# Patient Record
Sex: FEMALE | Race: WHITE | Employment: OTHER | URBAN - METROPOLITAN AREA
[De-identification: names, ages, dates, MRNs, and addresses within clinical notes are randomized per-mention and may not be internally consistent; named-entity substitution may affect disease eponyms.]

---

## 2017-01-01 ENCOUNTER — HOSPITAL ENCOUNTER (OUTPATIENT)
Dept: OTHER | Age: 62
Discharge: HOME | End: 2017-01-31
Attending: FAMILY MEDICINE | Admitting: FAMILY MEDICINE

## 2017-01-01 ENCOUNTER — HOSPITAL ENCOUNTER (OUTPATIENT)
Dept: OTHER | Age: 62
Discharge: HOME | End: 2017-01-31
Attending: PHYSICAL MEDICINE & REHABILITATION | Admitting: PHYSICAL MEDICINE & REHABILITATION

## 2017-01-09 ENCOUNTER — ANTI-COAG VISIT (OUTPATIENT)
Dept: PHARMACY | Facility: CLINIC | Age: 62
End: 2017-01-09

## 2017-01-09 LAB — INR BLD: 1.7

## 2017-01-16 ENCOUNTER — ANTI-COAG VISIT (OUTPATIENT)
Dept: PHARMACY | Facility: CLINIC | Age: 62
End: 2017-01-16

## 2017-01-16 LAB — INR BLD: 4.5

## 2017-01-20 ENCOUNTER — ANTI-COAG VISIT (OUTPATIENT)
Dept: PHARMACY | Facility: CLINIC | Age: 62
End: 2017-01-20

## 2017-01-20 LAB — INR BLD: 3.9

## 2017-01-25 ENCOUNTER — ANTI-COAG VISIT (OUTPATIENT)
Dept: PHARMACY | Facility: CLINIC | Age: 62
End: 2017-01-25

## 2017-01-25 LAB — INR BLD: 6.1

## 2017-01-30 ENCOUNTER — ANTI-COAG VISIT (OUTPATIENT)
Dept: PHARMACY | Facility: CLINIC | Age: 62
End: 2017-01-30

## 2017-01-30 LAB — INR BLD: 2.1

## 2017-02-01 ENCOUNTER — HOSPITAL ENCOUNTER (OUTPATIENT)
Dept: OTHER | Age: 62
Discharge: HOME | End: 2017-02-28
Attending: PHYSICAL MEDICINE & REHABILITATION | Admitting: PHYSICAL MEDICINE & REHABILITATION

## 2017-02-01 ENCOUNTER — HOSPITAL ENCOUNTER (OUTPATIENT)
Dept: OTHER | Age: 62
Discharge: HOME | End: 2017-02-28
Attending: FAMILY MEDICINE | Admitting: FAMILY MEDICINE

## 2017-02-06 ENCOUNTER — ANTI-COAG VISIT (OUTPATIENT)
Dept: PHARMACY | Facility: CLINIC | Age: 62
End: 2017-02-06

## 2017-02-06 LAB — INR BLD: 4.8

## 2017-02-13 ENCOUNTER — ANTI-COAG VISIT (OUTPATIENT)
Dept: PHARMACY | Facility: CLINIC | Age: 62
End: 2017-02-13

## 2017-02-13 LAB — INR BLD: 5.8

## 2017-02-20 ENCOUNTER — ANTI-COAG VISIT (OUTPATIENT)
Dept: PHARMACY | Facility: CLINIC | Age: 62
End: 2017-02-20

## 2017-02-20 LAB — INR BLD: 3

## 2017-02-27 ENCOUNTER — ANTI-COAG VISIT (OUTPATIENT)
Dept: PHARMACY | Facility: CLINIC | Age: 62
End: 2017-02-27

## 2017-02-27 LAB — INR BLD: 2.4

## 2017-03-20 ENCOUNTER — HOSPITAL ENCOUNTER (OUTPATIENT)
Dept: PHYSICAL THERAPY | Age: 62
Discharge: HOME | End: 2017-03-20
Admitting: PHYSICAL MEDICINE & REHABILITATION

## 2017-03-20 ENCOUNTER — ANTI-COAG VISIT (OUTPATIENT)
Dept: PHARMACY | Facility: CLINIC | Age: 62
End: 2017-03-20

## 2017-03-20 LAB — INR BLD: 2

## 2017-03-23 ENCOUNTER — HOSPITAL ENCOUNTER (OUTPATIENT)
Dept: PHYSICAL THERAPY | Age: 62
Discharge: HOME | End: 2017-03-23
Admitting: PHYSICAL MEDICINE & REHABILITATION

## 2017-03-27 ENCOUNTER — HOSPITAL ENCOUNTER (OUTPATIENT)
Dept: PHYSICAL THERAPY | Age: 62
Discharge: HOME | End: 2017-03-27
Admitting: FAMILY MEDICINE

## 2017-03-30 ENCOUNTER — HOSPITAL ENCOUNTER (OUTPATIENT)
Dept: PHYSICAL THERAPY | Age: 62
Discharge: HOME | End: 2017-03-30
Admitting: PHYSICAL MEDICINE & REHABILITATION

## 2017-04-10 ENCOUNTER — OFFICE VISIT (OUTPATIENT)
Dept: URGENT CARE | Age: 62
End: 2017-04-10

## 2017-04-10 VITALS
BODY MASS INDEX: 25.61 KG/M2 | OXYGEN SATURATION: 90 % | WEIGHT: 150 LBS | DIASTOLIC BLOOD PRESSURE: 68 MMHG | SYSTOLIC BLOOD PRESSURE: 102 MMHG | TEMPERATURE: 98.8 F | RESPIRATION RATE: 16 BRPM | HEART RATE: 92 BPM | HEIGHT: 64 IN

## 2017-04-10 DIAGNOSIS — S60.051A CONTUSION OF RIGHT LITTLE FINGER WITHOUT DAMAGE TO NAIL, INITIAL ENCOUNTER: Primary | ICD-10-CM

## 2017-04-10 PROCEDURE — 99203 OFFICE O/P NEW LOW 30 MIN: CPT | Performed by: EMERGENCY MEDICINE

## 2017-04-17 ENCOUNTER — ANTI-COAG VISIT (OUTPATIENT)
Dept: PHARMACY | Facility: CLINIC | Age: 62
End: 2017-04-17

## 2017-04-17 LAB — INR BLD: 2.4

## 2017-05-18 ENCOUNTER — ANTI-COAG VISIT (OUTPATIENT)
Dept: PHARMACY | Facility: CLINIC | Age: 62
End: 2017-05-18

## 2017-05-18 LAB — INR BLD: 3.3

## 2017-06-08 ENCOUNTER — ANTI-COAG VISIT (OUTPATIENT)
Dept: PHARMACY | Facility: CLINIC | Age: 62
End: 2017-06-08

## 2017-06-08 LAB — INR BLD: 3.4

## 2017-07-10 ENCOUNTER — ANTI-COAG VISIT (OUTPATIENT)
Dept: PHARMACY | Facility: CLINIC | Age: 62
End: 2017-07-10

## 2017-07-10 LAB — INR BLD: 2.2

## 2017-08-07 ENCOUNTER — ANTI-COAG VISIT (OUTPATIENT)
Dept: PHARMACY | Facility: CLINIC | Age: 62
End: 2017-08-07

## 2017-08-07 LAB — INR BLD: 1.6

## 2017-08-28 ENCOUNTER — ANTI-COAG VISIT (OUTPATIENT)
Dept: PHARMACY | Facility: CLINIC | Age: 62
End: 2017-08-28

## 2017-08-28 LAB — INR BLD: 1.7

## 2017-09-08 ENCOUNTER — HOSPITAL ENCOUNTER (OUTPATIENT)
Dept: OTHER | Age: 62
Discharge: HOME | End: 2017-09-30
Attending: INTERNAL MEDICINE | Admitting: INTERNAL MEDICINE

## 2017-09-08 ENCOUNTER — ANTI-COAG VISIT (OUTPATIENT)
Dept: PHARMACY | Facility: CLINIC | Age: 62
End: 2017-09-08

## 2017-09-08 LAB — INR BLD: 2

## 2017-10-02 ENCOUNTER — ANTI-COAG VISIT (OUTPATIENT)
Dept: PHARMACY | Facility: CLINIC | Age: 62
End: 2017-10-02

## 2017-10-02 LAB — INR BLD: 2.3

## 2017-10-30 ENCOUNTER — ANTI-COAG VISIT (OUTPATIENT)
Dept: PHARMACY | Facility: CLINIC | Age: 62
End: 2017-10-30

## 2017-10-30 LAB — INR BLD: 1.3

## 2017-10-30 NOTE — PROGRESS NOTES
Ms. Chase Hair is here for management of anticoagulation for Afib. PMH also significant for GERD, anxiety, depression, cardiomyopathy, hypothyroid, chronic back pain, hx of breast cancer, pacemaker placement, back surgery, and osteopenia. She presents today w/out complaint. Pt verifies dosing regimen as listed above. Pt denies s/s bleeding/bruising/swelling/SOB. No BRBPR. No melena. No changes in Rx/OTCs/Herbal medications. Extra dark salads (instead of aleyda) and blueberries almost everyday (not typical for her). Pt does not smoke and rarely drinks EtOH. Pt stated she will be going on a vacation to [facility] for 2 weeks, leaving in two days! INR 1.3 is well below therapeutic range of 2-3. Maybe extra vit k this past week- darker salads and blueberries. Recommend to increase dose to 3 mg daily, except 4.5mg on Mon. Patient has Warfarin 3mg tablets. Will continue to monitor and check INR in 3 weeks (due to traveling). Dosing reminder card given with phone number, appointment date and time.    Return to clinic: 11/20/2017 @ 100p

## 2017-11-01 ENCOUNTER — HOSPITAL ENCOUNTER (OUTPATIENT)
Dept: OTHER | Age: 62
Discharge: HOME | End: 2017-11-30
Attending: INTERNAL MEDICINE | Admitting: INTERNAL MEDICINE

## 2017-11-20 ENCOUNTER — ANTI-COAG VISIT (OUTPATIENT)
Dept: PHARMACY | Facility: CLINIC | Age: 62
End: 2017-11-20

## 2017-11-20 LAB — INR BLD: 1.2

## 2017-11-20 NOTE — PROGRESS NOTES
Ms. Debra Stern is here for management of anticoagulation for Afib. PMH also significant for GERD, anxiety, depression, cardiomyopathy, hypothyroid, chronic back pain, hx of breast cancer, pacemaker placement, back surgery, and osteopenia. She presents today w/out complaint. Pt verifies dosing regimen as listed above. Pt denies s/s bleeding/bruising/swelling/SOB. No BRBPR. No melena. No changes in Rx/OTCs/Herbal medications. She was in [place] so diet was off, but she tried to watch her diet with vit k. Pt does not smoke and rarely drinks EtOH. INR 1.2 is still well below therapeutic range of 2-3. Not sure why her INR is so low? ? Recommend to increase dose to 3 mg daily, except 4.5mg on Mon/Wed/Fri. Patient has Warfarin 3mg tablets. Will continue to monitor and check INR in 2 weeks. Dosing reminder card given with phone number, appointment date and time.    Return to clinic: 12/4/2017 @ 100p

## 2017-11-27 ENCOUNTER — TELEPHONE (OUTPATIENT)
Dept: PULMONOLOGY | Age: 62
End: 2017-11-27

## 2017-11-27 DIAGNOSIS — R06.02 SOB (SHORTNESS OF BREATH): Primary | ICD-10-CM

## 2017-12-01 ENCOUNTER — HOSPITAL ENCOUNTER (OUTPATIENT)
Dept: OTHER | Age: 62
Discharge: HOME | End: 2017-12-31
Attending: INTERNAL MEDICINE | Admitting: INTERNAL MEDICINE

## 2017-12-04 ENCOUNTER — ANTI-COAG VISIT (OUTPATIENT)
Dept: PHARMACY | Facility: CLINIC | Age: 62
End: 2017-12-04

## 2017-12-04 LAB — INR BLD: 1.3

## 2017-12-04 NOTE — PROGRESS NOTES
Ms. Cayden Pickering is here for management of anticoagulation for Afib. PMH also significant for GERD, anxiety, depression, cardiomyopathy, hypothyroid, chronic back pain, hx of breast cancer, pacemaker placement, back surgery, and osteopenia. She presents today w/out complaint. Pt verifies dosing regimen as listed above. Pt denies s/s bleeding/bruising/swelling/SOB. No BRBPR. No melena. No changes in Rx/OTCs/Herbal medications. She was in [place] so diet was off, but she tried to watch her diet with vit k. Pt does not smoke and rarely drinks EtOH. Patient recently started on doxycyline 11/28 and will finish 12/7    INR 1.3 is still well below therapeutic range of 2-3. Not sure why her INR is so low? ? Recommend to increase dose to 4.5 mg on all days; except 3 mg on Saturday. Patient has Warfarin 3mg tablets. Will continue to monitor and check INR in 1 week. Dosing reminder card given with phone number, appointment date and time. Return to clinic: 12/11/2017 @ 100p    I have seen the patient and reviewed the progress note written by the PharmD Candidate. I agree with this assessment and plan.    Krupa Slade, Angy 12/4/2017 2:29 PM

## 2017-12-06 ENCOUNTER — HOSPITAL ENCOUNTER (OUTPATIENT)
Dept: PULMONOLOGY | Age: 62
Discharge: HOME | End: 2017-12-06
Attending: INTERNAL MEDICINE | Admitting: INTERNAL MEDICINE

## 2017-12-06 DIAGNOSIS — R06.02 SHORTNESS OF BREATH: ICD-10-CM

## 2017-12-06 DIAGNOSIS — R06.02 SOB (SHORTNESS OF BREATH): ICD-10-CM

## 2017-12-06 RX ORDER — ALBUTEROL SULFATE 90 UG/1
4 AEROSOL, METERED RESPIRATORY (INHALATION) ONCE
Status: COMPLETED | OUTPATIENT
Start: 2017-12-06 | End: 2017-12-06

## 2017-12-06 RX ADMIN — ALBUTEROL SULFATE 4 PUFF: 90 AEROSOL, METERED RESPIRATORY (INHALATION) at 13:35

## 2017-12-06 NOTE — PROCEDURES
[facility] Pulmonary Function Lab - Six Minute Walk      Test Performed on:   Room Air____x__   Oxygen at _____ lpm via N/C- continuous Oxygen at _____ lpm via N/C- OCD  Assist Device Used During Test:  None______ Cane____x____ Walker_________    Modified Sandie's Scale  0 Nothing at all 5 Strong    0.5 Extremely Weak 6 Stronger (Hard)    1 Very weak 7 Very Strong   2 Weak (light) 8 Very Very Strong   3 Moderate 9 Extremely Strong   4 Somewhat Strong 10 Maximum All out      Time SpO2 Heart Rate Respiratory Rate Dyspnea-  Modified Sandies Scale Fatigue- Modified Sandies Scale Other Symptoms   Baseline                     %97 80 16 1 1      1 minute                     %97 100 16 1 1    2 minutes                     %97 113 18 1 1      3 minutes                 93    % 116 20 2 1    4 minutes                   94  % 81 20 2 1    5 minutes                     %90 110 22 2 1    6 minutes                     %88 124 22 3 1    Recovery x 1 minute                     %96 106 20 2 1    Recovery x 2 Minute                     %96 99 18 2 1     Number of Laps_______5__ X 100 feet + __50_______ additional feet = Total Distance __550_____feet   Stopped or paused before 6 minutes? No_______ Yes __x______  Total expected 6 MW distance is __499______meters. Patient achieved __34____% of expected distance. Pre Blood Pressure:  Post Blood Pressure:   Other symptoms at the end of exercise:

## 2017-12-08 NOTE — PROCEDURES
[address] [address]                                PULMONARY FUNCTION    PATIENT NAME: Destini Jacome                  :        1955  MED REC NO:   [id number]                          ROOM:  ACCOUNT NO:   [de-identified]                          ADMIT DATE: 2017  PROVIDER:     Reyna Beasley MD    DATE OF PROCEDURE:  2017    PFT INTERPRETATION    The patient is a 79-year-old female, who underwent a PFT for shortness of  breath. Spirometry shows FVC of 76%, FEV1 of 76%, FEV1 to FVC ratio was  100%, FEF 25-75% was 71%. The patient did not have any significant  post-bronchodilator improvement on this study. The patient's lung volume  shows a total lung capacity to be minimally reduced. The patient has air  trapping. Along with that, the patient also has some decrease in ERV. The  patient's diffusion capacity when adjusted for volume was normal.  The  patient's flow-volume loop was normal.  The patient also had a 6-minute  walk test, which shows the patient's baseline oxygen of 97% with a heart  rate of 80%, respiratory rate of 16%, modified dyspnea scale of 1, fatigue  modified dyspnea scale of 1. The patient had minimal oxygen desaturation  at 6 minutes of walk at 88% with a heart rate of 124, but 1-minute recovery  was 96% with heart rate of 106. The patient's 2-minute recovery showed  oxygen saturation of 96% with a heart rate of 99%. The patient's distance  walked was 550 feet. The patient's total expected 6-minute walk test was  499 meters with the patient achieved 34% of the expected distance. The  patient on the basis of this has mild obstructive airway disease. Along  with that, the patient also has mild restrictive lung disease and needs to  be correlated clinically.   Along with that, the patient also has minimal  oxygen desaturation with good recovery, but with suboptimal exercise and  may need to be evaluated further. Please correlate clinically.         Stephy Villalba MD    D: 12/07/2017 16:36:02       T: 12/07/2017 18:23:49     [id number]  Job#: [id number]     Doc#: [id number]    CC:

## 2017-12-11 ENCOUNTER — ANTI-COAG VISIT (OUTPATIENT)
Dept: PHARMACY | Facility: CLINIC | Age: 62
End: 2017-12-11

## 2017-12-11 LAB — INR BLD: 1.5

## 2017-12-11 NOTE — PROGRESS NOTES
Ms. Janeen Morales is here for management of anticoagulation for Afib. PMH also significant for GERD, anxiety, depression, cardiomyopathy, hypothyroid, chronic back pain, hx of breast cancer, pacemaker placement, back surgery, and osteopenia. She presents today w/out complaint. Pt verifies dosing regimen as listed above. Pt denies s/s bleeding/bruising/swelling/SOB. No BRBPR. No melena. No changes in Rx/OTCs/Herbal medications. She was in [place] so diet was off, but she tried to watch her diet with vit k. Pt does not smoke and rarely drinks EtOH. Patient recently started on doxycyline 11/28 and will finish 12/7    INR 1.5 is still well below therapeutic range of 2-3. Not sure why her INR is so low? ? Recommend to increase dose to 4.5 mg on all days; except 6 mg on Mon & Fri  Patient has Warfarin 3mg tablets. Will continue to monitor and check INR in 1 week. Dosing reminder card given with phone number, appointment date and time. Return to clinic: 12/18/2017 @ 1:15pm    I have seen the patient and reviewed the progress note written by the PharmD Candidate. I agree with this assessment and plan.    Jasson Oglesby, Angy 12/11/2017 3:15 PM

## 2017-12-14 ENCOUNTER — TELEPHONE (OUTPATIENT)
Dept: PULMONOLOGY | Age: 62
End: 2017-12-14

## 2017-12-14 ENCOUNTER — OFFICE VISIT (OUTPATIENT)
Dept: PULMONOLOGY | Age: 62
End: 2017-12-14

## 2017-12-14 VITALS
WEIGHT: 142 LBS | OXYGEN SATURATION: 98 % | RESPIRATION RATE: 16 BRPM | BODY MASS INDEX: 24.24 KG/M2 | SYSTOLIC BLOOD PRESSURE: 128 MMHG | DIASTOLIC BLOOD PRESSURE: 78 MMHG | HEART RATE: 82 BPM | HEIGHT: 64 IN

## 2017-12-14 DIAGNOSIS — I48.0 PAROXYSMAL ATRIAL FIBRILLATION (HCC): ICD-10-CM

## 2017-12-14 DIAGNOSIS — F41.0 PANIC ATTACKS: Primary | ICD-10-CM

## 2017-12-14 DIAGNOSIS — J98.4 RESTRICTIVE LUNG DISEASE: ICD-10-CM

## 2017-12-14 PROCEDURE — 3017F COLORECTAL CA SCREEN DOC REV: CPT | Performed by: INTERNAL MEDICINE

## 2017-12-14 PROCEDURE — 99204 OFFICE O/P NEW MOD 45 MIN: CPT | Performed by: INTERNAL MEDICINE

## 2017-12-14 PROCEDURE — G8427 DOCREV CUR MEDS BY ELIG CLIN: HCPCS | Performed by: INTERNAL MEDICINE

## 2017-12-14 PROCEDURE — 3014F SCREEN MAMMO DOC REV: CPT | Performed by: INTERNAL MEDICINE

## 2017-12-14 PROCEDURE — G8482 FLU IMMUNIZE ORDER/ADMIN: HCPCS | Performed by: INTERNAL MEDICINE

## 2017-12-14 PROCEDURE — G8420 CALC BMI NORM PARAMETERS: HCPCS | Performed by: INTERNAL MEDICINE

## 2017-12-14 ASSESSMENT — ENCOUNTER SYMPTOMS
BACK PAIN: 1
EYE REDNESS: 0
SHORTNESS OF BREATH: 1
WHEEZING: 0

## 2017-12-14 NOTE — PROGRESS NOTES
[facility]    Outpatient Note    Subjective:   CHIEF COMPLAINT:   Chief Complaint   Patient presents with    New Patient          Shortness of Breath       HPI:     The patient is 60 y.o. female who presents today as a consultation for second opinion. She is complaining of recurrent episodes of shortness of breath, she is describing it as abrupt onset that can happen at any time. It usually last for 1 minute or less during this time she feels dizzy and about to pass out she has to lean on something, and try to reach for her inhaler. There is no audible wheezing during these episodes, no chest pain or palpitation. She denied any fever or chills, no cough or sputum production. She has complex past medical history, including breast cancer, congestive heart failure, which is thought to be due to chemotherapy. History of extensive back surgeries with hardware's. She is an ex-smoker quit in 1992. No history of COPD  Other history mentioned below. She describes herself as anxious person. She had episodes of panic attack prior to her last back surgery 1 year ago. She describes prior panic attacks as shortness of breath, however it feels different than what she is having now. After my evaluation and when she was checking out she had one of these episodes. She was standing in front of the nursing desk, I can describe it as frozen, when I talked to her she said she is shaky and she starts feeling numb. I quickly examined her while she was standing, there was no wheezing and no tachycardia.   The whole episode ended within 1 minute    Past Medical History:    Past Medical History:   Diagnosis Date    Acute DVT (deep venous thrombosis) ([facility] [address] 7/2014    right arm     Allergic rhinitis     Anxiety     Atrial fibrillation (HCC)     B12 deficiency     Cancer [facility])     breast cancer    CARDIAC TAMPONADE 6/2014    following insertion ICD/defibrillator - window placed to remove fluid - 7/2014    Cardiomyopathy 1/20/11    Dr. Cierra Ribera  EF=37%. MUGA scan 2/11 - shows Normal EF=55% 4/2012 - shows EF of 25% with severe global hypokinesis    Cataracts     bilateral    Chronic back pain     Dr. Leonidas Clarke - he gives her percocet - monitors.  DDD (degenerative disc disease), lumbosacral 2013    multi - level     Depression     DJD (degenerative joint disease), cervical     bone spurs of neck and spine    GERD (gastroesophageal reflux disease)     H/O cold sores     HX: breast cancer 1992    left - spread to lymph nodes and Tx with left mastectomy - modified radical and chemo tx x 3 agents and radiation and bone marrow transplant.     Hypothyroid 1/21/14    ICD (implantable cardiac defibrillator) in place 6/10/2014    Neuropathy (HCC)     left arm and hand from chemotherapy    Osteopenia 4/23/07    -1.3 in spine and -1.5 in hip    Pneumonia 1/2014    S/P cardiac cath 4/04    arteries ok - EF - 30% see report, Dr. Angela Thompson History:    History   Smoking Status    Former Smoker    Quit date: 1/1/1992   Smokeless Tobacco    Never Used     Comment: pack a week        Family History:  Family History   Problem Relation Age of Onset    Arthritis Mother      rheumatoid    Diabetes Mother      II    High Blood Pressure Mother     High Cholesterol Mother     High Cholesterol Father     Cancer Father      prostate    Arthritis Father      bilateral tkr       Current Medications:  Current Outpatient Prescriptions on File Prior to Visit   Medication Sig Dispense Refill    warfarin (COUMADIN) 3 MG tablet Take 4.5 mg (3 mg x 1.5) on Mon, Fri; 6 mg (3 mg x 2) all other days 168 tablet 3    dicloxacillin (DYNAPEN) 250 MG capsule Take 250 mg by mouth daily      DULoxetine (CYMBALTA) 30 MG capsule Take 30 mg by mouth daily      fluticasone (FLONASE) 50 MCG/ACT nasal spray 2 sprays by Nasal route daily      clonazePAM (KLONOPIN) 1 MG tablet Take 2 mg by mouth nightly       polyethylene glycol (GLYCOLAX) packet Take 17 g by mouth daily      albuterol (PROAIR HFA) 108 (90 BASE) MCG/ACT inhaler Inhale 2 puffs into the lungs every 6 hours as needed for Wheezing. 1 Inhaler 3    folic acid (FOLVITE) 1 MG tablet TAKE 1 TABLET EVERY DAY 30 tablet 1    levothyroxine (SYNTHROID) 25 MCG tablet TAKE 1 TABLET EVERY DAY 90 tablet 0    furosemide (LASIX) 20 MG tablet Take 20 mg by mouth daily.  vitamin D (CHOLECALCIFEROL) 1000 UNIT TABS tablet Take 1,000 Units by mouth daily.  famciclovir (FAMVIR) 500 MG tablet Take 1 tablet by mouth 3 times daily as needed. 90 tablet 1    lisinopril (PRINIVIL;ZESTRIL) 2.5 MG tablet Take 1 tablet by mouth daily. 30 tablet 5    carvedilol (COREG) 3.125 MG tablet Take 3.125 mg by mouth 2 times daily (with meals) 1/2 tab in the morning and 1/2 tab at night      azithromycin (ZITHROMAX) 250 MG tablet Per package instructions 1 packet 0    oxyCODONE-acetaminophen (PERCOCET) 5-325 MG per tablet 1-2 pills every 4 hours as needed for pain. 15 tablet 0     No current facility-administered medications on file prior to visit. REVIEW OF SYSTEMS:    Review of Systems   Constitutional: Negative for chills, fever and unexpected weight change. HENT: Negative for congestion. Eyes: Negative for redness. Respiratory: Positive for shortness of breath. Negative for wheezing. Cardiovascular: Negative for chest pain, palpitations and leg swelling. Musculoskeletal: Positive for back pain. Skin: Negative for rash. Neurological: Positive for dizziness and numbness. Psychiatric/Behavioral:        Anxiety   All other systems reviewed and are negative. Objective:   PHYSICAL EXAM:        VITALS:  /78 (Site: Right Arm, Position: Sitting, Cuff Size: Medium Adult)   Pulse 82   Resp 16   Ht 5' 3.5\" (1.613 m)   Wt 142 lb (64.4 kg)   SpO2 98%   Breastfeeding?  No   BMI 24.76 kg/m²     Physical Exam Constitutional: She is oriented to person, place, and time. She appears well-developed and well-nourished. HENT:   Head: Normocephalic and atraumatic. Eyes: EOM are normal. Pupils are equal, round, and reactive to light. Neck: Neck supple. No JVD present. Cardiovascular: Normal rate and regular rhythm. Exam reveals no gallop and no friction rub. No murmur heard. Pulmonary/Chest: Effort normal and breath sounds normal. No stridor. No respiratory distress. She has no wheezes. She has no rales. Abdominal: Soft. Bowel sounds are normal.   Musculoskeletal: She exhibits no edema or deformity. Neurological: She is alert and oriented to person, place, and time. Skin: Skin is warm and dry. Psychiatric: She has a normal mood and affect. Her behavior is normal.   Vitals reviewed. DATA:    Echo on 4/11/17 showed EF of 40-45% due to global hypokinesis  PFT/FVC ratio of 77% which is 100% predicted. FEV1 less than 76% predicted FVC less than 76 predicted TLC less than 79% predicted. CT scan on 5/21/16 shows no evidence of PE small right lower lobe infiltrate consistent with pneumonia (images were reviewed)  CT chest without contrast on 8/31/17 shows a rounded area of groundglass opacity within the right lower lobe infectious or inflammatory in nature. I don't have the actual images of this CT scan      Assessment:     1. Panic attacks     2. Restrictive lung disease     3. Paroxysmal atrial fibrillation [facility])         Plan:   60years old female with recurrent brief episodes of shortness of breath associated with dizziness. These episodes lost for about a minute. She had one while she was in the office. It is not associated with chest pain  She was tried on albuterol and Breo. She doesn't feel either of them is helping  PFT is consistent with restrictive disease. No evidence of fibrosis on CT scan. She does have significant back surgeries that can explain this PFT finding.       She was seen by

## 2017-12-14 NOTE — TELEPHONE ENCOUNTER
Neida Johnson MD     Fax info to Cardio Doc   [phone] fax   phone- [phone]  Will Fax once Office note is done.

## 2017-12-18 ENCOUNTER — ANTI-COAG VISIT (OUTPATIENT)
Dept: PHARMACY | Facility: CLINIC | Age: 62
End: 2017-12-18

## 2017-12-18 LAB — INR BLD: 1.6

## 2017-12-18 NOTE — PROGRESS NOTES
Ms. Kathryn Cedillo is here for management of anticoagulation for Afib. PMH also significant for GERD, anxiety, depression, cardiomyopathy, hypothyroid, chronic back pain, hx of breast cancer, pacemaker placement, back surgery, and osteopenia. She presents today w/out complaint. Pt verifies dosing regimen as listed above. Pt denies s/s bleeding/bruising/swelling/SOB. No BRBPR. No melena. No changes in Rx/OTCs/Herbal medications. Pt does not smoke and rarely drinks EtOH. INR 1.6 is still well below therapeutic range of 2-3. Not sure why her INR is so low? ? Recommended to decrease protein bars to 4-5 times a week to see if that helps. Recommend to increase dose to 4.5 mg on all days, except 6 mg on Mon/Wed/Fri. Patient has Warfarin 3mg tablets. Will continue to monitor and check INR in 1 week. Dosing reminder card given with phone number, appointment date and time.    Return to clinic: 12/28/2017 @ 1:00pm

## 2017-12-28 ENCOUNTER — ANTI-COAG VISIT (OUTPATIENT)
Dept: PHARMACY | Facility: CLINIC | Age: 62
End: 2017-12-28

## 2017-12-28 LAB — INR BLD: 2.1

## 2017-12-28 NOTE — PROGRESS NOTES
Ms. Chase Hair is here for management of anticoagulation for Afib. PMH also significant for GERD, anxiety, depression, cardiomyopathy, hypothyroid, chronic back pain, hx of breast cancer, pacemaker placement, back surgery, and osteopenia. She presents today w/out complaint. Pt verifies dosing regimen as listed above. Pt denies s/s bleeding/bruising/swelling/SOB. No BRBPR. No melena. No changes in Rx/OTCs/Herbal medications. Pt does not smoke and rarely drinks EtOH. Pt had cut back on her protein bars since last visit, as well as diet changes from holidays. INR 2.1 is within therapeutic range of 2-3. Recommend to continue 4.5 mg on all days, except 6 mg on Mon/Wed/Fri. Patient has Warfarin 3mg tablets. Will continue to monitor and check INR in 2 weeks. Dosing reminder card given with phone number, appointment date and time.    Return to clinic: 1/9/2018 @ 1:00pm    Jeri Pizano PharmD 1:03 PM 12/28/17

## 2018-01-01 ENCOUNTER — HOSPITAL ENCOUNTER (OUTPATIENT)
Dept: OTHER | Age: 63
Discharge: HOME | End: 2018-01-31
Attending: INTERNAL MEDICINE | Admitting: INTERNAL MEDICINE

## 2018-01-09 ENCOUNTER — ANTI-COAG VISIT (OUTPATIENT)
Dept: PHARMACY | Facility: CLINIC | Age: 63
End: 2018-01-09

## 2018-01-09 LAB — INR BLD: 1.7

## 2018-01-15 ENCOUNTER — ANTI-COAG VISIT (OUTPATIENT)
Dept: PHARMACY | Facility: CLINIC | Age: 63
End: 2018-01-15

## 2018-01-15 LAB — INR BLD: 2.6

## 2018-01-29 ENCOUNTER — ANTI-COAG VISIT (OUTPATIENT)
Dept: PHARMACY | Facility: CLINIC | Age: 63
End: 2018-01-29

## 2018-01-29 LAB — INR BLD: 3.7

## 2018-02-01 ENCOUNTER — HOSPITAL ENCOUNTER (OUTPATIENT)
Dept: OTHER | Age: 63
Discharge: HOME | End: 2018-02-28
Attending: INTERNAL MEDICINE | Admitting: INTERNAL MEDICINE

## 2018-02-12 ENCOUNTER — ANTI-COAG VISIT (OUTPATIENT)
Dept: PHARMACY | Facility: CLINIC | Age: 63
End: 2018-02-12

## 2018-02-12 LAB — INR BLD: 2.8

## 2018-03-01 ENCOUNTER — HOSPITAL ENCOUNTER (OUTPATIENT)
Dept: OTHER | Age: 63
Discharge: HOME | End: 2018-03-31
Attending: INTERNAL MEDICINE | Admitting: INTERNAL MEDICINE

## 2018-03-05 ENCOUNTER — ANTI-COAG VISIT (OUTPATIENT)
Dept: PHARMACY | Facility: CLINIC | Age: 63
End: 2018-03-05

## 2018-03-05 LAB — INR BLD: 2.7

## 2018-03-06 ENCOUNTER — HOSPITAL ENCOUNTER (OUTPATIENT)
Dept: PHYSICAL THERAPY | Age: 63
Discharge: HOME | End: 2018-03-31
Admitting: INTERNAL MEDICINE

## 2018-04-01 ENCOUNTER — HOSPITAL ENCOUNTER (OUTPATIENT)
Dept: OTHER | Age: 63
Discharge: HOME | End: 2018-04-30
Attending: INTERNAL MEDICINE | Admitting: INTERNAL MEDICINE

## 2018-04-02 ENCOUNTER — ANTI-COAG VISIT (OUTPATIENT)
Dept: PHARMACY | Facility: CLINIC | Age: 63
End: 2018-04-02

## 2018-04-02 LAB — INR BLD: 2.6

## 2018-05-01 ENCOUNTER — HOSPITAL ENCOUNTER (OUTPATIENT)
Dept: OTHER | Age: 63
Discharge: HOME | End: 2018-05-31
Attending: INTERNAL MEDICINE | Admitting: INTERNAL MEDICINE

## 2018-05-03 ENCOUNTER — ANTI-COAG VISIT (OUTPATIENT)
Dept: PHARMACY | Facility: CLINIC | Age: 63
End: 2018-05-03

## 2018-05-03 LAB — INR BLD: 1.9

## 2018-05-09 ENCOUNTER — OFFICE VISIT (OUTPATIENT)
Dept: ORTHOPEDIC SURGERY | Age: 63
End: 2018-05-09

## 2018-05-09 ENCOUNTER — HOSPITAL ENCOUNTER (OUTPATIENT)
Dept: OCCUPATIONAL THERAPY | Age: 63
Discharge: HOME | End: 2018-05-31
Admitting: ORTHOPAEDIC SURGERY

## 2018-05-09 VITALS — WEIGHT: 141.98 LBS | BODY MASS INDEX: 25.16 KG/M2 | HEIGHT: 63 IN

## 2018-05-09 DIAGNOSIS — S52.132A CLOSED DISPLACED FRACTURE OF NECK OF LEFT RADIUS, INITIAL ENCOUNTER: ICD-10-CM

## 2018-05-09 DIAGNOSIS — R52 PAIN: Primary | ICD-10-CM

## 2018-05-09 PROCEDURE — G8427 DOCREV CUR MEDS BY ELIG CLIN: HCPCS | Performed by: ORTHOPAEDIC SURGERY

## 2018-05-09 PROCEDURE — 3017F COLORECTAL CA SCREEN DOC REV: CPT | Performed by: ORTHOPAEDIC SURGERY

## 2018-05-09 PROCEDURE — 24650 CLTX RDL HEAD/NCK FX WO MNPJ: CPT | Performed by: ORTHOPAEDIC SURGERY

## 2018-05-09 PROCEDURE — 99203 OFFICE O/P NEW LOW 30 MIN: CPT | Performed by: ORTHOPAEDIC SURGERY

## 2018-05-09 PROCEDURE — G8419 CALC BMI OUT NRM PARAM NOF/U: HCPCS | Performed by: ORTHOPAEDIC SURGERY

## 2018-05-09 PROCEDURE — L3660 SO 8 AB RSTR CAN/WEB PRE OTS: HCPCS | Performed by: ORTHOPAEDIC SURGERY

## 2018-05-09 PROCEDURE — 1036F TOBACCO NON-USER: CPT | Performed by: ORTHOPAEDIC SURGERY

## 2018-05-14 ENCOUNTER — ANTI-COAG VISIT (OUTPATIENT)
Dept: PHARMACY | Facility: CLINIC | Age: 63
End: 2018-05-14

## 2018-05-14 LAB — INR BLD: 2.3

## 2018-05-16 ENCOUNTER — OFFICE VISIT (OUTPATIENT)
Dept: ORTHOPEDIC SURGERY | Age: 63
End: 2018-05-16

## 2018-05-16 DIAGNOSIS — M25.522 ELBOW PAIN, LEFT: Primary | ICD-10-CM

## 2018-05-16 DIAGNOSIS — S52.132A CLOSED DISPLACED FRACTURE OF NECK OF LEFT RADIUS, INITIAL ENCOUNTER: ICD-10-CM

## 2018-05-16 PROCEDURE — 99024 POSTOP FOLLOW-UP VISIT: CPT | Performed by: ORTHOPAEDIC SURGERY

## 2018-05-30 ENCOUNTER — TELEPHONE (OUTPATIENT)
Dept: ORTHOPEDIC SURGERY | Age: 63
End: 2018-05-30

## 2018-06-01 ENCOUNTER — OFFICE VISIT (OUTPATIENT)
Dept: ORTHOPEDIC SURGERY | Age: 63
End: 2018-06-01

## 2018-06-01 ENCOUNTER — HOSPITAL ENCOUNTER (OUTPATIENT)
Dept: OCCUPATIONAL THERAPY | Age: 63
Discharge: HOME | End: 2018-06-30
Attending: ORTHOPAEDIC SURGERY | Admitting: ORTHOPAEDIC SURGERY

## 2018-06-01 ENCOUNTER — HOSPITAL ENCOUNTER (OUTPATIENT)
Dept: OTHER | Age: 63
Discharge: HOME | End: 2018-06-30
Attending: INTERNAL MEDICINE | Admitting: INTERNAL MEDICINE

## 2018-06-01 ENCOUNTER — HOSPITAL ENCOUNTER (OUTPATIENT)
Dept: OTHER | Age: 63
Discharge: HOME | End: 2018-06-14
Attending: INTERNAL MEDICINE | Admitting: INTERNAL MEDICINE

## 2018-06-01 VITALS — HEIGHT: 63 IN | BODY MASS INDEX: 25.16 KG/M2 | WEIGHT: 141.98 LBS

## 2018-06-01 DIAGNOSIS — R52 PAIN: Primary | ICD-10-CM

## 2018-06-01 DIAGNOSIS — S52.132D CLOSED DISPLACED FRACTURE OF NECK OF LEFT RADIUS WITH ROUTINE HEALING, SUBSEQUENT ENCOUNTER: ICD-10-CM

## 2018-06-01 PROBLEM — S52.132A CLOSED DISPLACED FRACTURE OF NECK OF LEFT RADIUS: Status: ACTIVE | Noted: 2018-06-01

## 2018-06-01 PROCEDURE — 99024 POSTOP FOLLOW-UP VISIT: CPT | Performed by: ORTHOPAEDIC SURGERY

## 2018-06-11 ENCOUNTER — ANTI-COAG VISIT (OUTPATIENT)
Dept: PHARMACY | Facility: CLINIC | Age: 63
End: 2018-06-11

## 2018-06-11 LAB — INR BLD: 2.4

## 2018-06-19 ENCOUNTER — OFFICE VISIT (OUTPATIENT)
Dept: ORTHOPEDIC SURGERY | Age: 63
End: 2018-06-19

## 2018-06-19 VITALS — HEIGHT: 63 IN | WEIGHT: 141.98 LBS | BODY MASS INDEX: 25.16 KG/M2

## 2018-06-19 DIAGNOSIS — S52.132D CLOSED DISPLACED FRACTURE OF NECK OF LEFT RADIUS WITH ROUTINE HEALING, SUBSEQUENT ENCOUNTER: ICD-10-CM

## 2018-06-19 DIAGNOSIS — M25.522 ELBOW PAIN, LEFT: Primary | ICD-10-CM

## 2018-06-19 PROCEDURE — 99024 POSTOP FOLLOW-UP VISIT: CPT | Performed by: ORTHOPAEDIC SURGERY

## 2018-07-01 ENCOUNTER — HOSPITAL ENCOUNTER (OUTPATIENT)
Dept: OTHER | Age: 63
Discharge: HOME | End: 2018-07-01
Attending: INTERNAL MEDICINE | Admitting: INTERNAL MEDICINE

## 2018-07-09 ENCOUNTER — ANTI-COAG VISIT (OUTPATIENT)
Dept: PHARMACY | Facility: CLINIC | Age: 63
End: 2018-07-09

## 2018-07-09 LAB — INR BLD: 2.5

## 2018-07-09 NOTE — PROGRESS NOTES
Ms. Genseis Daily is here for management of anticoagulation for Afib. PMH also significant for GERD, anxiety, depression, cardiomyopathy, hypothyroid, chronic back pain, hx of breast cancer, pacemaker placement, back surgery, and osteopenia. She presents today w/out complaint. Pt verifies dosing regimen as listed above. Pt denies s/s bleeding/bruising/swelling/SOB. No BRBPR. No melena. No changes in Rx/OTC/Herbal medications. Pt does not smoke and rarely drinks EtOH. She is trying to stick with about 4 protein bars per week, she did add the blueberries back in- almost daily. Encouraged her to continue 1-2 servings of broccoli or ludivina slaw. Her arm is broken from fall several weeks ago, fell again and arm is not healing as fast as they hoped. INR 2.5 within therapeutic range of 2-3. Recommend to continue 4.5 mg on all days, except 6mg on Mon/Wed/Fri. Patient has Warfarin 3mg tablets. Will continue to monitor and check INR in 4 weeks. Dosing reminder card given with phone number, appointment date and time.    Return to clinic: 8/6 @ 1:00 pm

## 2018-07-10 ENCOUNTER — TELEPHONE (OUTPATIENT)
Dept: ORTHOPEDIC SURGERY | Age: 63
End: 2018-07-10

## 2018-07-10 DIAGNOSIS — S42.409A CLOSED FRACTURE OF ELBOW, UNSPECIFIED LATERALITY, INITIAL ENCOUNTER: Primary | ICD-10-CM

## 2018-07-10 NOTE — TELEPHONE ENCOUNTER
----- Message from Maeve Perez MD sent at 7/10/2018  7:40 AM EDT -----  Like any fracture of the elbow stiffness as this early following the injury that she sustained when she fell is very common. This includes flexion and extension of the elbow along with rotation of the forearm. Yes, therapy should be prescribed. As discussed with her at last visit we have been very slow with progressing motion and initiating therapy secondary to her fracture that we did not want to displace further. Therapy can begin this week. Range of motion, stretching, strengthening and modalities. Follow-up as discussed at last visit.      ----- Message -----  From: Troy Aquino MA  Sent: 7/9/2018   1:30 PM  To: Maeve Perez MD    6/19/18  Left radial head neck fracture DOI 4/29/2018  IMPRESSION AND PLAN: Left radial head fracture  Doing well after sustaining a left elbow injury, treated nonoperatively. X-rays reveal maintained alignment, patient is doing well clinically also. We will continue with our current care plan. Brace off for gentle motion and hygiene. She will wear her brace when in the community and when up and ambulating otherwise, she is somewhat unstable on her feet, we do not want to risk displacement or refracture as the fracture has not fully healed yet. Follow-up in 4-5 weeks unless needed sooner. Repeat x-ray left elbow that time. All questions and concerns were addressed today. Patient is in agreement with the plan      Pt called, stating she was told to try to put palm down, and she is unable to rotate wrist at all. She is very concerned. I do not see where she has been prescribed any OT. Do you want to see her or send her to ot?

## 2018-07-12 ENCOUNTER — HOSPITAL ENCOUNTER (OUTPATIENT)
Dept: OCCUPATIONAL THERAPY | Age: 63
Discharge: HOME | End: 2018-07-13
Admitting: ORTHOPAEDIC SURGERY

## 2018-07-12 NOTE — FLOWSHEET NOTE
gripping etc.      OBJECTIVE:   Date:  Hand Dominance:     [x]  Right    [] Left 7/12/2018      Objective Measures:     PAIN 1/10    Quick DASH 73%   Digit  ROM         Index                                      Long                                Ring                                Small     Digits tip to DPFC in cm Able to make loose fist (Same as pre fracture per pt. Thumb ROM MP  IP   Thumb opposition  R:  L:   Thumb Radial/Palmar abd ROM R:  L:   Wrist ROM Ext/Flex R:  L:   Rad/Uln dev ROM R:  L:   Forearm ROM  Sup/pron R: 80/80  L: WNL/70   Elbow ROM Ext/flex R:  L: 5/142   Shoulder Flex  Shoulder Abd  Shoulder IR/ER     Edema in cm circumf. MCPJs R:  L:   Edema in cm circumf. Elbow  R: 23cm  L: 24.5 cm     strength in lbs R: 50#  L: 16#   Pinch Strengthin lbs: lat  R:  L:   Pinch Strength in lbs:  3 point R:  L:      MMT:             Modalities: 7/12/18      NO STIM  HP with extra towels x 6'         Therapeutic Exercise & Activities:     AROM/PROM elbow  5' with cues                          Education and precautions                           Therapeutic Exercise and NMR EXR  [x] (62729) Provided verbal/tactile cueing for activities related to strengthening, flexibility, endurance, ROM  for improvements in scapular, scapulothoracic and UE control with self care, reaching, carrying, lifting, house/yardwork, driving/computer work.    [] (19862) Provided verbal/tactile cueing for activities related to improving balance, coordination, kinesthetic sense, posture, motor skill, proprioception  to assist with  scapular, scapulothoracic and UE control with self care, reaching, carrying, lifting, house/yardwork, driving/computer work. Therapeutic Activities:    [] ( 19878) therapeutic activities, direct (one on one) patient contact. Use of dynamic activities to improve functional performance.     Activities of Daily Living:  [] (96456) Provided self-care/home management training (i.e., activities of daily

## 2018-07-12 NOTE — PLAN OF CARE
intake form. 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 Pt. Also has balance problems and hx of cancer. She reports that L hand did not function properly prior to fracture. Difficulty with buttons, zippers, gripping etc.     Pain Scale: 0/10 1-2/10  []Constant      [x]Intermittent    []other:  Pain Location:  Elbow   Easing factors:   Provocative factors: use      Occupational Profile:  Home Enviroment: lives with  [x] spouse,  [] family,  [] alone,  [] significant other,   [] other:    Occupation/School: retired     Recreational Activities/Meaningful Interests: house chores, gardening, pilates, walking     Prior Level of Function: [x] Independent with ADLs/IADLs     [] Assistance needed (describe):    Patient-Identified Primary Performance Deficits (to be addressed in POC):   [x] bathing    [x] household tasks (cooking/cleaning)   [x] dressing    [] self feeding   [x] grooming    [] work/education   [] functional mobility   [] sleeping/rest   [] toileting/hygiene   [x] recreational activities   [] driving    [] community/social participation   [] other:     Comorbidities Affecting Functional Performance:     [x]Anxiety (F41.9)/Depression (F32.9)   []Diabetes Type 1(E10.65) or 2 (E11.65)   []Rheumatoid Arthritis (M05.9)  []Fibromyalgia (M79.7)  [x]Neuropathy(G60.9)  []Osteoarthritis(M19.91)  []None   []Other:    OBJECTIVE:   Date:  Hand Dominance:     [x]  Right    [] Left 7/12/2018     Objective Measures:    PAIN 1/10    Quick DASH 73%   Digit  ROM         Index                                   Long                               Ring                               Small    Digits tip to DPFC in cm Able to make loose fist (Same as pre fracture per pt.     Thumb ROM MP  IP   Thumb opposition  R:  L:   Thumb Radial/Palmar abd ROM R:  L:   Wrist ROM Ext/Flex R:  L:   Rad/Uln dev ROM R:  L:   Forearm ROM

## 2018-07-19 ENCOUNTER — HOSPITAL ENCOUNTER (OUTPATIENT)
Dept: OCCUPATIONAL THERAPY | Age: 63
Setting detail: THERAPIES SERIES
Discharge: HOME | End: 2018-07-19
Payer: MEDICARE

## 2018-07-19 PROCEDURE — 97112 NEUROMUSCULAR REEDUCATION: CPT | Performed by: OCCUPATIONAL THERAPIST

## 2018-07-19 PROCEDURE — 97140 MANUAL THERAPY 1/> REGIONS: CPT | Performed by: OCCUPATIONAL THERAPIST

## 2018-07-19 PROCEDURE — 97110 THERAPEUTIC EXERCISES: CPT | Performed by: OCCUPATIONAL THERAPIST

## 2018-07-19 NOTE — FLOWSHEET NOTE
not function properly prior to fracture. Difficulty with buttons, zippers, gripping etc.      OBJECTIVE:   Date:  Hand Dominance:     [x]  Right    [] Left 7/12/2018 7/19/18    Objective Measures:      PAIN 1/10     Quick DASH 73%    Digit  ROM         Index                                       Long                                 Ring                                 Small      Digits tip to DPFC in cm Able to make loose fist (Same as pre fracture per pt. Thumb ROM MP  IP    Thumb opposition  R:  L:    Thumb Radial/Palmar abd ROM R:  L:    Wrist ROM Ext/Flex R:  L:    Rad/Uln dev ROM R:  L:    Forearm ROM  Sup/pron R: 80/80  L: WNL/70    Elbow ROM Ext/flex R:  L: 5/142 R: 3/143  L: 0/150   Shoulder Flex  Shoulder Abd  Shoulder IR/ER      Edema in cm circumf. MCPJs R:  L:    Edema in cm circumf.   Elbow  R: 23cm  L: 24.5 cm    L: 23.5    strength in lbs R: 50#  L: 16#   L: 16#   Pinch Strengthin lbs: lat  R:  L:    Pinch Strength in lbs:  3 point R:  L:       MMT:              Modalities: 7/12/18 7/19/18    NO STIM  HP with extra towels x 6' No heat         Therapeutic Exercise & Activities:     AROM/PROM elbow  5' with cues  AROM elbow    pulleys     Bicep curls   2# 15 x  1# 15 x   S/p   No weight with focus on stabilizing wrist in neutral    Wrist ext against gravity   15 x 2     Education and precautions                           Therapeutic Exercise and NMR EXR  [x] (96793) Provided verbal/tactile cueing for activities related to strengthening, flexibility, endurance, ROM  for improvements in scapular, scapulothoracic and UE control with self care, reaching, carrying, lifting, house/yardwork, driving/computer work.    [] ([id number]) Provided verbal/tactile cueing for activities related to improving balance, coordination, kinesthetic sense, posture, motor skill, proprioception  to assist with  scapular, scapulothoracic and UE control with self care, reaching, carrying, lifting, house/yardwork,

## 2018-07-24 ENCOUNTER — OFFICE VISIT (OUTPATIENT)
Dept: ORTHOPEDIC SURGERY | Age: 63
End: 2018-07-24

## 2018-07-24 DIAGNOSIS — M25.522 ELBOW PAIN, LEFT: Primary | ICD-10-CM

## 2018-07-24 DIAGNOSIS — S42.409A CLOSED FRACTURE OF ELBOW, UNSPECIFIED LATERALITY, INITIAL ENCOUNTER: ICD-10-CM

## 2018-07-24 PROCEDURE — 3017F COLORECTAL CA SCREEN DOC REV: CPT | Performed by: ORTHOPAEDIC SURGERY

## 2018-07-24 PROCEDURE — G8427 DOCREV CUR MEDS BY ELIG CLIN: HCPCS | Performed by: ORTHOPAEDIC SURGERY

## 2018-07-24 PROCEDURE — E0191 PROTECTOR HEEL OR ELBOW: HCPCS | Performed by: ORTHOPAEDIC SURGERY

## 2018-07-24 PROCEDURE — G8419 CALC BMI OUT NRM PARAM NOF/U: HCPCS | Performed by: ORTHOPAEDIC SURGERY

## 2018-07-24 PROCEDURE — 1036F TOBACCO NON-USER: CPT | Performed by: ORTHOPAEDIC SURGERY

## 2018-07-24 PROCEDURE — 99024 POSTOP FOLLOW-UP VISIT: CPT | Performed by: ORTHOPAEDIC SURGERY

## 2018-07-24 NOTE — PROGRESS NOTES
Chief Complaint   Patient presents with    Follow-up     Left radial head neck fracture DOI 4/29/2018       HISTORY OF PRESENT ILLNESS:  Maya Crawford is a 58 y.o.  patient here for repeat x-ray evaluation after sustaining a Left elbow injury with a left radial head fracture approximately 2.5 months ago, treated nonoperatively. Once again, she does have limited use of the left arm secondary to previous nerve injury following thoracic outlet surgery. No pain at rest at this point. She has some discomfort in the elbow with certain stretching maneuvers. She has done some therapy to help regain motion. ROS:  ROS neg     Past medical history is reviewed again today, no changes to report    PHYSICAL EXAMINATION:  Patient is alert and pleasant, in no acute distress. The affected extremity is examined today. Left elbow reveals normal alignment clinically with minimal residual swelling. No sign of DVT. Palpation over the radial head does not cause discomfort or crepitance. She has extension to 0 and flexion to 120. No crepitance or clicking. No varus or valgus instability. When the elbow was isolated there is full pronation and 80° of supination. Stable neuro exam left hand as before with profound atrophy    X-rays:  3 views, left elbow, impression: Radial head fracture that is healing, no new displacement    IMPRESSION AND PLAN: Left elbow pain, left radial head fracture, left elbow stiffness    X-rays reveal maintained alignment, patient is doing well clinically also. We will continue with our current care plan. Progressive range of motion and use of the left arm. Edema sleeve. We may transition from the firm brace to a Heelbo except for unsafe activities which then I do recommend the firm brace until fully healed. She is can be very careful not to fall on the left arm and cause refracture. Follow-up in 3 months unless needed sooner. Repeat x-ray left elbow at that time.         Procedures   

## 2018-07-27 ENCOUNTER — HOSPITAL ENCOUNTER (OUTPATIENT)
Dept: OCCUPATIONAL THERAPY | Age: 63
Setting detail: THERAPIES SERIES
Discharge: HOME | End: 2018-07-27
Payer: MEDICARE

## 2018-07-27 PROCEDURE — 97112 NEUROMUSCULAR REEDUCATION: CPT | Performed by: OCCUPATIONAL THERAPIST

## 2018-07-27 PROCEDURE — 97110 THERAPEUTIC EXERCISES: CPT | Performed by: OCCUPATIONAL THERAPIST

## 2018-08-06 ENCOUNTER — ANTI-COAG VISIT (OUTPATIENT)
Dept: PHARMACY | Age: 63
End: 2018-08-06
Payer: MEDICARE

## 2018-08-06 LAB — INR BLD: 2.9

## 2018-08-06 PROCEDURE — 99211 OFF/OP EST MAY X REQ PHY/QHP: CPT | Performed by: INTERNAL MEDICINE

## 2018-08-06 PROCEDURE — 85610 PROTHROMBIN TIME: CPT | Performed by: INTERNAL MEDICINE

## 2018-08-06 RX ORDER — SPIRONOLACTONE 25 MG/1
25 TABLET ORAL DAILY
COMMUNITY

## 2018-08-06 NOTE — PROGRESS NOTES
Ms. Justo Franklin is here for management of anticoagulation for Afib. PMH also significant for GERD, anxiety, depression, cardiomyopathy, hypothyroid, chronic back pain, hx of breast cancer, pacemaker placement, back surgery, and osteopenia. She presents today w/out complaint. Pt verifies dosing regimen as listed above. Pt denies s/s bleeding/bruising/swelling/SOB. No BRBPR. No melena. No changes in Rx/OTC/Herbal medications. Pt does not smoke and rarely drinks EtOH. She is trying to stick with about 4 protein bars per week, she did add the blueberries back in- almost daily. Encouraged her to continue 1-2 servings of broccoli or ludivina slaw. Her arm is broken from fall several months ago, and it is not healing as fast as they hoped. She is leaving tomorrow for a vacation to [place] a [place]. INR 2.9 within therapeutic range of 2-3. Recommend to continue 4.5 mg on all days, except 6mg on Mon/Wed/Fri. Patient has Warfarin 3mg tablets. Will continue to monitor and check INR in 5 weeks. Dosing reminder card given with phone number, appointment date and time. Return to clinic: 9/10 @ 1:00 pm    I have seen the patient and reviewed the progress note written by the PharmD Candidate. I agree with this assessment and plan.    Zoe Almeida, Angy 8/6/2018 1:55 PM

## 2018-08-23 DIAGNOSIS — S42.402A CLOSED FRACTURE OF LEFT ELBOW, INITIAL ENCOUNTER: Primary | ICD-10-CM

## 2018-09-04 ENCOUNTER — HOSPITAL ENCOUNTER (OUTPATIENT)
Dept: PHYSICAL THERAPY | Age: 63
Setting detail: THERAPIES SERIES
Discharge: HOME | End: 2018-09-04
Payer: MEDICARE

## 2018-09-04 PROCEDURE — G8986 CARRY D/C STATUS: HCPCS

## 2018-09-04 PROCEDURE — 97140 MANUAL THERAPY 1/> REGIONS: CPT

## 2018-09-04 PROCEDURE — 97161 PT EVAL LOW COMPLEX 20 MIN: CPT

## 2018-09-04 PROCEDURE — G8985 CARRY GOAL STATUS: HCPCS

## 2018-09-04 PROCEDURE — G8984 CARRY CURRENT STATUS: HCPCS

## 2018-09-04 NOTE — PROGRESS NOTES
Clearing of 5 major brennan areas followed by KENNETH SCOTT x 30 minutes with pathway across chest. Tubigrip applied. Extra time spent educating pt in self massage and/or how spouse can assist with this treatment. Written handouts with pictures provided. Pt voices understanding. Assessment   Conditions Requiring Skilled Therapeutic Intervention  Assessment: Pt referred to PT For LUE lymphedema. S/P L radical mastectomy, lymph node removal, chemo and radiation [id number]. Recent L radius fx 4/29/2018 with conservative management in splint. Hand therapist noted lymphedema developing and initiated referral to PT. Today lymphedema appears resolved. Pt educated in lymphedema prevention/precautions, use of compression and self massage. One treatment of manual lymph drainage done to promote lymph flow as well as to educate pt in technique and let her feel amt of presssure used. Pt voiced understanding of all instructions and agrees no additional PT needed at this time. Pt given written materials with pictures and info on obtaining compression sleeve 20-30mmHG to wear once splint removed (will cont easier to don tubigrip until then). No additional PT planned  Treatment Diagnosis: Latency stage LUE lymphedema  Prognosis: Excellent  Decision Making: Low Complexity  Patient Education: role of PT, lymphedema prevention/precautions, use of compression, self massage  Barriers to Learning: none, pt voices understanding  REQUIRES PT FOLLOW UP: No  Activity Tolerance: Patient Tolerated treatment well      Plan   Times per week: 1 x only  Current Treatment Recommendations: Manual Lymphatic Drainage, Safety Education & Training    G-Code  PT G-Codes  Functional Assessment Tool Used: Quick DASH  Score: Raw Score 47  Functional Limitation: Carrying, moving and handling objects  Carrying, Moving and Handling Objects Current Status ():  At least 80 percent but less than 100 percent impaired, limited or restricted  Carrying, Moving and Handling Objects Goal Status (): At least 60 percent but less than 80 percent impaired, limited or restricted  Carrying, Moving and Handling Objects Discharge Status ():  At least 80 percent but less than 100 percent impaired, limited or restricted    Goals  Short term goals  Time Frame for Short term goals: 1 visit  Short term goal 1: pt to voice understanding of lymphedema prevention/precaution  Short term goal 2: pt to be indep with use of tubigrip for compression at this time  Short term goal 3: pt to voice understanding of clearing major brennan areas and self massage LUE to prevent/reduce lymphedema  Patient Goals   Patient goals : prevent future episodes of lymphedema     Therapy Time   Individual Concurrent Group Co-treatment   Time In 1300         Time Out 1400         Minutes 60         Timed Code Treatment Minutes: 56 Lashawn Jefferson, [id number]

## 2018-09-10 ENCOUNTER — ANTI-COAG VISIT (OUTPATIENT)
Dept: PHARMACY | Age: 63
End: 2018-09-10
Payer: MEDICARE

## 2018-09-10 LAB — INTERNATIONAL NORMALIZATION RATIO, POC: 3.2

## 2018-09-10 PROCEDURE — 85610 PROTHROMBIN TIME: CPT

## 2018-09-10 PROCEDURE — 99211 OFF/OP EST MAY X REQ PHY/QHP: CPT

## 2018-10-02 ENCOUNTER — OFFICE VISIT (OUTPATIENT)
Dept: ORTHOPEDIC SURGERY | Age: 63
End: 2018-10-02
Payer: MEDICARE

## 2018-10-02 DIAGNOSIS — S52.132D CLOSED DISPLACED FRACTURE OF NECK OF LEFT RADIUS WITH ROUTINE HEALING, SUBSEQUENT ENCOUNTER: ICD-10-CM

## 2018-10-02 DIAGNOSIS — M25.522 LEFT ELBOW PAIN: Primary | ICD-10-CM

## 2018-10-02 PROCEDURE — G8484 FLU IMMUNIZE NO ADMIN: HCPCS | Performed by: ORTHOPAEDIC SURGERY

## 2018-10-02 PROCEDURE — 1036F TOBACCO NON-USER: CPT | Performed by: ORTHOPAEDIC SURGERY

## 2018-10-02 PROCEDURE — G8427 DOCREV CUR MEDS BY ELIG CLIN: HCPCS | Performed by: ORTHOPAEDIC SURGERY

## 2018-10-02 PROCEDURE — G8419 CALC BMI OUT NRM PARAM NOF/U: HCPCS | Performed by: ORTHOPAEDIC SURGERY

## 2018-10-02 PROCEDURE — 99213 OFFICE O/P EST LOW 20 MIN: CPT | Performed by: ORTHOPAEDIC SURGERY

## 2018-10-02 PROCEDURE — 3017F COLORECTAL CA SCREEN DOC REV: CPT | Performed by: ORTHOPAEDIC SURGERY

## 2018-10-08 ENCOUNTER — ANTI-COAG VISIT (OUTPATIENT)
Dept: PHARMACY | Age: 63
End: 2018-10-08
Payer: MEDICARE

## 2018-10-08 LAB — INR BLD: 2

## 2018-10-08 PROCEDURE — 99211 OFF/OP EST MAY X REQ PHY/QHP: CPT

## 2018-10-08 PROCEDURE — 85610 PROTHROMBIN TIME: CPT

## 2018-11-05 ENCOUNTER — ANTI-COAG VISIT (OUTPATIENT)
Dept: PHARMACY | Age: 63
End: 2018-11-05
Payer: MEDICARE

## 2018-11-05 LAB — INR BLD: 2.6

## 2018-11-05 PROCEDURE — 99211 OFF/OP EST MAY X REQ PHY/QHP: CPT | Performed by: INTERNAL MEDICINE

## 2018-11-05 PROCEDURE — 85610 PROTHROMBIN TIME: CPT | Performed by: INTERNAL MEDICINE

## 2018-12-17 ENCOUNTER — ANTI-COAG VISIT (OUTPATIENT)
Dept: PHARMACY | Age: 63
End: 2018-12-17
Payer: MEDICARE

## 2018-12-17 LAB — INR BLD: 3.6

## 2018-12-17 PROCEDURE — 85610 PROTHROMBIN TIME: CPT | Performed by: INTERNAL MEDICINE

## 2018-12-17 PROCEDURE — 99211 OFF/OP EST MAY X REQ PHY/QHP: CPT | Performed by: INTERNAL MEDICINE

## 2019-01-03 ENCOUNTER — ANTI-COAG VISIT (OUTPATIENT)
Dept: PHARMACY | Age: 64
End: 2019-01-03
Payer: MEDICARE

## 2019-01-03 LAB — INTERNATIONAL NORMALIZATION RATIO, POC: 5.9

## 2019-01-03 PROCEDURE — 99211 OFF/OP EST MAY X REQ PHY/QHP: CPT

## 2019-01-03 PROCEDURE — 85610 PROTHROMBIN TIME: CPT

## 2019-01-07 ENCOUNTER — ANTI-COAG VISIT (OUTPATIENT)
Dept: PHARMACY | Age: 64
End: 2019-01-07
Payer: MEDICARE

## 2019-01-07 LAB — INR BLD: 1.6

## 2019-01-07 PROCEDURE — 99211 OFF/OP EST MAY X REQ PHY/QHP: CPT | Performed by: INTERNAL MEDICINE

## 2019-01-07 PROCEDURE — 85610 PROTHROMBIN TIME: CPT | Performed by: INTERNAL MEDICINE

## 2019-01-08 ENCOUNTER — OFFICE VISIT (OUTPATIENT)
Dept: ORTHOPEDIC SURGERY | Age: 64
End: 2019-01-08
Payer: MEDICARE

## 2019-01-08 VITALS — WEIGHT: 141.98 LBS | HEIGHT: 63 IN | BODY MASS INDEX: 25.16 KG/M2

## 2019-01-08 DIAGNOSIS — M25.522 ELBOW PAIN, LEFT: Primary | ICD-10-CM

## 2019-01-08 DIAGNOSIS — S52.132D CLOSED DISPLACED FRACTURE OF NECK OF LEFT RADIUS WITH ROUTINE HEALING, SUBSEQUENT ENCOUNTER: ICD-10-CM

## 2019-01-08 PROCEDURE — 99213 OFFICE O/P EST LOW 20 MIN: CPT | Performed by: ORTHOPAEDIC SURGERY

## 2019-01-08 PROCEDURE — G8484 FLU IMMUNIZE NO ADMIN: HCPCS | Performed by: ORTHOPAEDIC SURGERY

## 2019-01-08 PROCEDURE — G8419 CALC BMI OUT NRM PARAM NOF/U: HCPCS | Performed by: ORTHOPAEDIC SURGERY

## 2019-01-08 PROCEDURE — 3017F COLORECTAL CA SCREEN DOC REV: CPT | Performed by: ORTHOPAEDIC SURGERY

## 2019-01-08 PROCEDURE — 1036F TOBACCO NON-USER: CPT | Performed by: ORTHOPAEDIC SURGERY

## 2019-01-08 PROCEDURE — G8427 DOCREV CUR MEDS BY ELIG CLIN: HCPCS | Performed by: ORTHOPAEDIC SURGERY

## 2019-01-14 ENCOUNTER — ANTI-COAG VISIT (OUTPATIENT)
Dept: PHARMACY | Age: 64
End: 2019-01-14
Payer: MEDICARE

## 2019-01-14 LAB — INR BLD: 2.6

## 2019-01-14 PROCEDURE — 99211 OFF/OP EST MAY X REQ PHY/QHP: CPT | Performed by: INTERNAL MEDICINE

## 2019-01-14 PROCEDURE — 85610 PROTHROMBIN TIME: CPT | Performed by: INTERNAL MEDICINE

## 2019-02-04 ENCOUNTER — ANTI-COAG VISIT (OUTPATIENT)
Dept: PHARMACY | Age: 64
End: 2019-02-04
Payer: MEDICARE

## 2019-02-04 LAB — INR BLD: 2.5

## 2019-02-04 PROCEDURE — 99211 OFF/OP EST MAY X REQ PHY/QHP: CPT | Performed by: INTERNAL MEDICINE

## 2019-02-04 PROCEDURE — 85610 PROTHROMBIN TIME: CPT | Performed by: INTERNAL MEDICINE

## 2019-03-01 ENCOUNTER — ANTI-COAG VISIT (OUTPATIENT)
Dept: PHARMACY | Age: 64
End: 2019-03-01
Payer: MEDICARE

## 2019-03-01 LAB — INR BLD: 2.5

## 2019-03-01 PROCEDURE — 99211 OFF/OP EST MAY X REQ PHY/QHP: CPT | Performed by: FAMILY MEDICINE

## 2019-03-01 PROCEDURE — 85610 PROTHROMBIN TIME: CPT | Performed by: FAMILY MEDICINE

## 2019-04-01 ENCOUNTER — ANTI-COAG VISIT (OUTPATIENT)
Dept: PHARMACY | Age: 64
End: 2019-04-01
Payer: MEDICARE

## 2019-04-01 LAB — INR BLD: 2.4

## 2019-04-01 PROCEDURE — 85610 PROTHROMBIN TIME: CPT | Performed by: INTERNAL MEDICINE

## 2019-04-01 PROCEDURE — 99211 OFF/OP EST MAY X REQ PHY/QHP: CPT | Performed by: INTERNAL MEDICINE

## 2019-04-01 NOTE — PROGRESS NOTES
Ms. Penney Farms Patch is here for management of anticoagulation for Afib. PMH also significant for GERD, anxiety, depression, cardiomyopathy, hypothyroid, chronic back pain, hx of breast cancer, pacemaker placement, back surgery, and osteopenia. Pt verifies dosing regimen. Pt denies s/s bleeding/bruising/swelling/SOB. No BRBPR. No melena. No changes in Rx/OTC/Herbal medications. Pt does not smoke and rarely drinks EtOH. Diet is slowly getting back to normal.     Patient has been stressed lately, her  had quadruple bypass surgery. INR 2.4 is within the therapeutic range of 2-3. Recommend to continue to 4.5mg daily, except 6mg MWF. Patient has Warfarin 3mg tablets. Will continue to monitor and check INR in 4 weeks. Dosing reminder card given with phone number, appointment date and time.   Return to clinic: 4/29 @ 1 pm   Referring physician: Dr. Jorge Wang

## 2019-04-29 ENCOUNTER — ANTI-COAG VISIT (OUTPATIENT)
Dept: PHARMACY | Age: 64
End: 2019-04-29
Payer: MEDICARE

## 2019-04-29 LAB — INR BLD: 2.7

## 2019-04-29 PROCEDURE — 99211 OFF/OP EST MAY X REQ PHY/QHP: CPT | Performed by: INTERNAL MEDICINE

## 2019-04-29 PROCEDURE — 85610 PROTHROMBIN TIME: CPT | Performed by: INTERNAL MEDICINE

## 2019-04-29 NOTE — PROGRESS NOTES
Ms. Manley Sever is here for management of anticoagulation for Afib. PMH also significant for GERD, anxiety, depression, cardiomyopathy, hypothyroid, chronic back pain, hx of breast cancer, pacemaker placement, back surgery, and osteopenia. Pt verifies dosing regimen. Pt denies s/s bleeding/bruising/swelling/SOB. No BRBPR. No melena. No changes in Rx/OTC/Herbal medications. Pt does not smoke and rarely drinks EtOH. Diet is slowly getting back to normal.     Patient has been stressed lately, her  had quadruple bypass surgery. INR 2.7 is within the therapeutic range of 2-3. Recommend to continue to 4.5mg daily, except 6mg MWF. Patient has Warfarin 3mg tablets. Will continue to monitor and check INR in 4 weeks. Dosing reminder card given with phone number, appointment date and time.   Return to clinic: 5/20 @ 1 pm   Referring physician: Dr. Anel Dobbs

## 2019-05-20 ENCOUNTER — ANTI-COAG VISIT (OUTPATIENT)
Dept: PHARMACY | Age: 64
End: 2019-05-20
Payer: MEDICARE

## 2019-05-20 LAB — INR BLD: 2.3

## 2019-05-20 PROCEDURE — 85610 PROTHROMBIN TIME: CPT | Performed by: INTERNAL MEDICINE

## 2019-05-20 PROCEDURE — 99211 OFF/OP EST MAY X REQ PHY/QHP: CPT | Performed by: INTERNAL MEDICINE

## 2019-05-20 NOTE — PROGRESS NOTES
Ms. Isidoro Jane is here for management of anticoagulation for Afib. PMH also significant for GERD, anxiety, depression, cardiomyopathy, hypothyroid, chronic back pain, hx of breast cancer, pacemaker placement, back surgery, and osteopenia. Pt verifies dosing regimen. Pt denies s/s bleeding/bruising/swelling/SOB. No BRBPR. No melena. No changes in Rx/OTC/Herbal medications. Pt does not smoke and rarely drinks EtOH. Diet is slowly getting back to normal.     Patient has been stressed lately, her  had quadruple bypass surgery, then PNA, then a PE. INR 2.3 is within the therapeutic range of 2-3. Recommend to continue to 4.5mg daily, except 6mg MWF. Patient has Warfarin 3mg tablets. Will continue to monitor and check INR in 4 weeks. Dosing reminder card given with phone number, appointment date and time.   Return to clinic: 6/24 @ 1 pm   Referring physician: Dr. Julia Chaney

## 2019-06-24 ENCOUNTER — ANTI-COAG VISIT (OUTPATIENT)
Dept: PHARMACY | Age: 64
End: 2019-06-24
Payer: MEDICARE

## 2019-06-24 LAB — INR BLD: 2.2

## 2019-06-24 PROCEDURE — 99211 OFF/OP EST MAY X REQ PHY/QHP: CPT | Performed by: INTERNAL MEDICINE

## 2019-06-24 PROCEDURE — 85610 PROTHROMBIN TIME: CPT | Performed by: INTERNAL MEDICINE

## 2019-06-24 RX ORDER — PILOCARPINE HYDROCHLORIDE 5 MG/1
5 TABLET, FILM COATED ORAL 3 TIMES DAILY
COMMUNITY

## 2019-06-24 NOTE — PROGRESS NOTES
Ms. Chapis Cedillo is here for management of anticoagulation for Afib. PMH also significant for GERD, anxiety, depression, cardiomyopathy, hypothyroid, chronic back pain, hx of breast cancer, pacemaker placement, back surgery, and osteopenia. Pt verifies dosing regimen. Pt denies s/s bleeding/bruising/swelling/SOB. No BRBPR. No melena. No changes in OTC/Herbal medications. She started on pilocarpine for dry mouth. Pt does not smoke and rarely drinks EtOH. Diet is slowly getting back to normal.     Patient has been stressed lately, her  had quadruple bypass surgery, then PNA, then a PE. On 7/12 she will having her pacemaker/defibrillator replaced. INR 2.2 is within the therapeutic range of 2-3. Recommend to continue to 4.5mg daily, except 6mg MWF. Patient has Warfarin 3mg tablets. Will continue to monitor and check INR in 4 weeks. Dosing reminder card given with phone number, appointment date and time.   Return to clinic: 7/29 @ 1 pm   Referring physician: Dr. Laurence Vilchis

## 2019-07-29 ENCOUNTER — ANTI-COAG VISIT (OUTPATIENT)
Dept: PHARMACY | Age: 64
End: 2019-07-29
Payer: MEDICARE

## 2019-07-29 LAB — INR BLD: 2

## 2019-07-29 PROCEDURE — 85610 PROTHROMBIN TIME: CPT

## 2019-07-29 PROCEDURE — 99211 OFF/OP EST MAY X REQ PHY/QHP: CPT

## 2019-08-22 ENCOUNTER — ANTI-COAG VISIT (OUTPATIENT)
Dept: PHARMACY | Age: 64
End: 2019-08-22
Payer: MEDICARE

## 2019-08-22 LAB — INTERNATIONAL NORMALIZATION RATIO, POC: 2.5

## 2019-08-22 PROCEDURE — 99211 OFF/OP EST MAY X REQ PHY/QHP: CPT

## 2019-08-22 PROCEDURE — 85610 PROTHROMBIN TIME: CPT

## 2019-08-22 NOTE — PROGRESS NOTES
Ms. Anila Poe is here for management of anticoagulation for Afib. PMH also significant for GERD, anxiety, depression, cardiomyopathy, hypothyroid, chronic back pain, hx of breast cancer, pacemaker placement, back surgery, and osteopenia. Pt verifies dosing regimen. Pt denies s/s bleeding/bruising/swelling/SOB. No BRBPR. No melena. No changes in OTC/Herbal medications. She started on pilocarpine for dry mouth. Pt does not smoke and rarely drinks EtOH. Diet is slowly getting back to normal.   Patient has been stressed lately, her  had quadruple bypass surgery, then PNA, then a PE. On Clindamycin for 7 days for incision wound from her ICD generator change. INR 2.5 is within the therapeutic range of 2-3. Recommend to continue to 4.5mg daily, except 6mg MWF. Patient has Warfarin 3mg tablets. Will continue to monitor and check INR in ~4 weeks. Dosing reminder card given with phone number, appointment date and time.   Return to clinic: 9/23 @ 1 pm   Referring physician: Dr. Kailyn Orellana PharmD  8/22/2019 at 9:46 AM

## 2019-09-23 ENCOUNTER — ANTI-COAG VISIT (OUTPATIENT)
Dept: PHARMACY | Age: 64
End: 2019-09-23
Payer: MEDICARE

## 2019-09-23 LAB — INTERNATIONAL NORMALIZATION RATIO, POC: 2.2

## 2019-09-23 PROCEDURE — 85610 PROTHROMBIN TIME: CPT | Performed by: INTERNAL MEDICINE

## 2019-09-23 PROCEDURE — 99211 OFF/OP EST MAY X REQ PHY/QHP: CPT | Performed by: INTERNAL MEDICINE

## 2019-09-23 NOTE — PROGRESS NOTES
Ms. Mary Garcia is here for management of anticoagulation for Afib. PMH also significant for GERD, anxiety, depression, cardiomyopathy, hypothyroid, chronic back pain, hx of breast cancer, pacemaker placement, back surgery, and osteopenia. Pt verifies dosing regimen. Pt denies s/s bleeding/bruising/swelling/SOB. No BRBPR. No melena. No changes in OTC/Herbal medications. She started on pilocarpine for dry mouth. Pt does not smoke and rarely drinks EtOH. Diet is slowly getting back to normal.     Pt complains of bruising after a procedure, however it is being monitored by cardiologist    INR 2.2 is within the therapeutic range of 2-3. Recommend to continue to 4.5mg daily, except 6mg MWF. Patient has Warfarin 3mg tablets. Will continue to monitor and check INR in 4 weeks. Dosing reminder card given with phone number, appointment date and time. Return to clinic: 10/21 @ 1 pm   Referring physician: Dr. Rowena Ley PharmD candidate  9/23/2019 at 1:11 PM     I have seen the patient and reviewed the progress note written by the PharmD Candidate. I agree with this assessment and plan.    Javi Mcqueen PharmD 9/23/2019 2:10 PM

## 2019-10-21 ENCOUNTER — ANTI-COAG VISIT (OUTPATIENT)
Dept: PHARMACY | Age: 64
End: 2019-10-21
Payer: MEDICARE

## 2019-10-21 LAB — INTERNATIONAL NORMALIZATION RATIO, POC: 3.5

## 2019-10-21 PROCEDURE — 85610 PROTHROMBIN TIME: CPT | Performed by: INTERNAL MEDICINE

## 2019-10-21 PROCEDURE — 99211 OFF/OP EST MAY X REQ PHY/QHP: CPT | Performed by: INTERNAL MEDICINE

## 2019-11-05 ENCOUNTER — ANTI-COAG VISIT (OUTPATIENT)
Dept: PHARMACY | Age: 64
End: 2019-11-05
Payer: MEDICARE

## 2019-11-05 LAB — INTERNATIONAL NORMALIZATION RATIO, POC: 2.8

## 2019-11-05 PROCEDURE — 99211 OFF/OP EST MAY X REQ PHY/QHP: CPT | Performed by: INTERNAL MEDICINE

## 2019-11-05 PROCEDURE — 85610 PROTHROMBIN TIME: CPT | Performed by: INTERNAL MEDICINE

## 2019-11-12 ENCOUNTER — TELEPHONE (OUTPATIENT)
Dept: PHARMACY | Age: 64
End: 2019-11-12

## 2019-12-19 ENCOUNTER — TELEPHONE (OUTPATIENT)
Dept: PHARMACY | Age: 64
End: 2019-12-19

## 2019-12-30 ENCOUNTER — ANTI-COAG VISIT (OUTPATIENT)
Dept: PHARMACY | Age: 64
End: 2019-12-30
Payer: MEDICARE

## 2019-12-30 LAB — INR BLD: 4.4

## 2019-12-30 PROCEDURE — 85610 PROTHROMBIN TIME: CPT | Performed by: INTERNAL MEDICINE

## 2019-12-30 PROCEDURE — 99211 OFF/OP EST MAY X REQ PHY/QHP: CPT | Performed by: INTERNAL MEDICINE

## 2020-01-06 ENCOUNTER — ANTI-COAG VISIT (OUTPATIENT)
Dept: PHARMACY | Age: 65
End: 2020-01-06

## 2020-01-06 LAB — INR BLD: 2.5

## 2020-01-06 NOTE — PROGRESS NOTES
Ms. Sara Abdi is here for management of anticoagulation for Afib. PMH also significant for GERD, anxiety, depression, cardiomyopathy, hypothyroid, chronic back pain, hx of breast cancer, pacemaker placement, back surgery, and osteopenia. Pt verifies dosing regimen. Pt denies s/s bleeding/bruising/swelling/SOB. No BRBPR. No melena. No changes in OTC/Herbal medications. 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 Pt does not smoke and rarely drinks EtOH. She states she has not being eating as much in general due to nausea from the vanco. She states her diet is off in general.     Addendum 12/19/19- Patient called to inform us that she recently been in the hospital where her coumadin was held for a procedure. Patient got discharged and coumadin restarted. Patient will be on IV antibiotic at home for 6 weeks. Home health nurse visits each Monday and does an INR. INR on Monday 12/16 was 1.4, I informed her that it is normal given that she just restarting her warfarin. Advised patient to take 6 mg today (12/19/10) and resume normal dosing of 6 mg MWF and 4.5 mg on all other days. Home nurse will visit patient again next Monday, asked patient keep clinic up to date on latest INR result. Pam Shin PharmD  12/19/2019 at 10:30 AM     Her [facility] RN is Fady Cain [phone]. She will be calling results into us now starting with [facility] 1/6/20. [facility] RN calls in results and reports only change is an increase in her vancomycin dose. INR 2.5 is within the therapeutic range of 2-3. Recommend to continue decrease dose of 4.5mg daily, except 6mg Wed. Patient has Warfarin 3mg tablets. Will continue to monitor and check INR in 1 week. Dosing reminder card given with phone number, appointment date and time.   Return to clinic: 1/13 [facility]  Referring physician: Dr. Kurt Resendiz

## 2020-01-13 ENCOUNTER — ANTI-COAG VISIT (OUTPATIENT)
Dept: PHARMACY | Age: 65
End: 2020-01-13

## 2020-01-13 LAB — INR BLD: 2.5

## 2020-01-20 ENCOUNTER — ANTI-COAG VISIT (OUTPATIENT)
Dept: PHARMACY | Age: 65
End: 2020-01-20

## 2020-01-20 LAB — INR BLD: 2.4

## 2020-01-20 NOTE — PROGRESS NOTES
Ms. Boogie Watts is here for management of anticoagulation for Afib. PMH also significant for GERD, anxiety, depression, cardiomyopathy, hypothyroid, chronic back pain, hx of breast cancer, pacemaker placement, back surgery, and osteopenia. Pt verifies dosing regimen. Pt denies s/s bleeding/bruising/swelling/SOB. No BRBPR. No melena. No changes in OTC/Herbal medications. 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 Pt does not smoke and rarely drinks EtOH. She states she has not being eating as much in general due to nausea from the vanco. She states her diet is off in general.     Her Susi Dillard RN is [facility] Level [phone]. She will be calling results into us now starting with [facility] 1/6/20. Susi Dillard RN calls in results and reports only change is an increase in her vancomycin dose. She will be getting IV site out of her arm tomorrow! INR 2.4 is within the therapeutic range of 2-3. Recommend to continue decrease dose of 4.5mg daily, except 6mg Wed. Patient has Warfarin 3mg tablets. Will continue to monitor and check INR in 2-3 weeks. Dosing reminder card given with phone number, appointment date and time.   Return to clinic: 2/10 at   Referring physician: Dr. Patricia Huber

## 2020-02-17 ENCOUNTER — ANTI-COAG VISIT (OUTPATIENT)
Dept: PHARMACY | Age: 65
End: 2020-02-17
Payer: MEDICARE

## 2020-02-17 LAB — INR BLD: 3

## 2020-02-17 PROCEDURE — 85610 PROTHROMBIN TIME: CPT | Performed by: INTERNAL MEDICINE

## 2020-02-17 PROCEDURE — 99211 OFF/OP EST MAY X REQ PHY/QHP: CPT | Performed by: INTERNAL MEDICINE

## 2020-03-16 ENCOUNTER — ANTI-COAG VISIT (OUTPATIENT)
Dept: PHARMACY | Age: 65
End: 2020-03-16
Payer: MEDICARE

## 2020-03-16 LAB — INR BLD: 4

## 2020-03-16 PROCEDURE — 99211 OFF/OP EST MAY X REQ PHY/QHP: CPT | Performed by: INTERNAL MEDICINE

## 2020-03-16 PROCEDURE — 85610 PROTHROMBIN TIME: CPT | Performed by: INTERNAL MEDICINE

## 2020-03-30 ENCOUNTER — ANTI-COAG VISIT (OUTPATIENT)
Dept: PHARMACY | Age: 65
End: 2020-03-30
Payer: MEDICARE

## 2020-03-30 LAB — INR BLD: 2.4

## 2020-03-30 PROCEDURE — 85610 PROTHROMBIN TIME: CPT | Performed by: INTERNAL MEDICINE

## 2020-03-30 PROCEDURE — 99211 OFF/OP EST MAY X REQ PHY/QHP: CPT | Performed by: INTERNAL MEDICINE

## 2020-05-18 ENCOUNTER — ANTI-COAG VISIT (OUTPATIENT)
Dept: PHARMACY | Age: 65
End: 2020-05-18
Payer: MEDICARE

## 2020-05-18 LAB — INR BLD: 2.3

## 2020-05-18 PROCEDURE — 85610 PROTHROMBIN TIME: CPT | Performed by: INTERNAL MEDICINE

## 2020-05-18 PROCEDURE — 99211 OFF/OP EST MAY X REQ PHY/QHP: CPT | Performed by: INTERNAL MEDICINE

## 2020-06-23 ENCOUNTER — ANTI-COAG VISIT (OUTPATIENT)
Dept: PHARMACY | Age: 65
End: 2020-06-23
Payer: MEDICARE

## 2020-06-23 LAB — INR BLD: 1.7

## 2020-06-23 PROCEDURE — 99211 OFF/OP EST MAY X REQ PHY/QHP: CPT

## 2020-06-23 PROCEDURE — 85610 PROTHROMBIN TIME: CPT

## 2020-07-20 ENCOUNTER — ANTI-COAG VISIT (OUTPATIENT)
Dept: PHARMACY | Age: 65
End: 2020-07-20
Payer: MEDICARE

## 2020-07-20 LAB — INR BLD: 2.1

## 2020-07-20 PROCEDURE — 85610 PROTHROMBIN TIME: CPT | Performed by: INTERNAL MEDICINE

## 2020-07-20 PROCEDURE — 99211 OFF/OP EST MAY X REQ PHY/QHP: CPT | Performed by: INTERNAL MEDICINE

## 2020-07-20 NOTE — PROGRESS NOTES
Ms. Raúl Holcomb is here for management of anticoagulation for Afib. PMH also significant for GERD, anxiety, depression, cardiomyopathy, hypothyroid, chronic back pain, hx of breast cancer, pacemaker placement, back surgery, and osteopenia. Pt verifies dosing regimen. Pt denies s/s bleeding/bruising/swelling/SOB. No BRBPR. No melena. No changes in OTC/Herbal medications. Pt does not smoke and rarely drinks EtOH. INR 2.1 is within the therapeutic range of 2-3. Recommend to continue 4.5mg daily, except 6 mg on Mondays. Patient has Warfarin 3mg tablets. Will continue to monitor and check INR in 4 weeks. Dosing reminder card given with phone number, appointment date and time.   Return to clinic: 8/24 @ 1pm  Referring physician: Dr. Elaine Romano

## 2020-08-24 ENCOUNTER — ANTI-COAG VISIT (OUTPATIENT)
Dept: PHARMACY | Age: 65
End: 2020-08-24
Payer: MEDICARE

## 2020-08-24 LAB — INR BLD: 2.3

## 2020-08-24 PROCEDURE — 99211 OFF/OP EST MAY X REQ PHY/QHP: CPT | Performed by: INTERNAL MEDICINE

## 2020-08-24 PROCEDURE — 85610 PROTHROMBIN TIME: CPT | Performed by: INTERNAL MEDICINE

## 2020-08-24 NOTE — PROGRESS NOTES
Ms. Darien Solis is here for management of anticoagulation for Afib. PMH also significant for GERD, anxiety, depression, cardiomyopathy, hypothyroid, chronic back pain, hx of breast cancer, pacemaker placement, back surgery, and osteopenia. Pt verifies dosing regimen. Pt denies s/s bleeding/bruising/swelling/SOB. No BRBPR. No melena. No changes in OTC/Herbal medications. Pt does not smoke and rarely drinks EtOH. INR 2.3 is within the therapeutic range of 2-3. Recommend to continue 4.5mg daily, except 6 mg on Mondays. Patient has Warfarin 3mg tablets. Will continue to monitor and check INR in 4 weeks. Dosing reminder card given with phone number, appointment date and time.   Return to clinic: 9/21 @ 1pm  Referring physician: Dr. Svitlana Hogue

## 2020-09-28 ENCOUNTER — ANTI-COAG VISIT (OUTPATIENT)
Dept: PHARMACY | Age: 65
End: 2020-09-28
Payer: MEDICARE

## 2020-09-28 LAB — INR BLD: 2.5

## 2020-09-28 PROCEDURE — 85610 PROTHROMBIN TIME: CPT | Performed by: INTERNAL MEDICINE

## 2020-09-28 PROCEDURE — 99211 OFF/OP EST MAY X REQ PHY/QHP: CPT | Performed by: INTERNAL MEDICINE

## 2020-10-26 ENCOUNTER — ANTI-COAG VISIT (OUTPATIENT)
Dept: PHARMACY | Age: 65
End: 2020-10-26
Payer: MEDICARE

## 2020-10-26 LAB — INTERNATIONAL NORMALIZATION RATIO, POC: 2.6

## 2020-10-26 PROCEDURE — 99211 OFF/OP EST MAY X REQ PHY/QHP: CPT | Performed by: INTERNAL MEDICINE

## 2020-10-26 PROCEDURE — 85610 PROTHROMBIN TIME: CPT | Performed by: INTERNAL MEDICINE

## 2020-11-18 NOTE — FLOWSHEET NOTE
[facility], [address] [place]  Phone: [phone]  Fax [phone]  Hand Therapy Daily Treatment Note  Date:  2018    Patient: Jaylan Yanez   : 1955   MRN: [id number]  Referring Physician: Referring Practitioner: Dr. James Berry Diagnosis Information:   Diagnosis: Diagnosis: L radial neck fx (S52.132D) Treatment Dx: L elbow stiffness M25.622         Date of injury: 17  Date of Surgery/Type of procedure: na                                     Insurance information:  medicare   Comorbidities Affecting Functional Performance:     [x]Anxiety (F41.9)/Depression (F32.9)   []Diabetes Type 1(E10.65) or 2 (E11.65)   []Rheumatoid Arthritis (M05.9)  []Fibromyalgia (M79.7)  [x]Neuropathy(G60.9)  []Osteoarthritis(M19.91)  []None   [x]Other: PACER   G-code: OT G-codes  Functional Assessment Tool Used: quick dash   Score: 73%  Functional Limitation: Carrying, moving and handling objects  Carrying, Moving and Handling Objects Current Status (): At least 60 percent but less than 80 percent impaired, limited or restricted  Carrying, Moving and Handling Objects Goal Status (): At least 20 percent but less than 40 percent impaired, limited or restricted    Date of Patient follow up with Physician:  Preferred Language for Healthcare:   [x]English       []other:  Latex Allergy:  [x]NO      []YES  RESTRICTIONS/PRECAUTIONS:  PACEMAKER!!! Neuropathy L UE . Lamine Meraz Limited sensation   Progress Note: [x]  Yes  []  No  Next due by : Visit #10       Visit # Insurance Allowable Requires auth   4     no:[]                  yes:[]      Pain level: 3 /10     SUBJECTIVE: Pt. Frustrated today that her bone is not healed all of the way and that her swelling is still there. Seeing Sedrick Macario soon for lymphedema mgmt.           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

## 2020-11-30 ENCOUNTER — ANTI-COAG VISIT (OUTPATIENT)
Dept: PHARMACY | Age: 65
End: 2020-11-30
Payer: MEDICARE

## 2020-11-30 LAB — INTERNATIONAL NORMALIZATION RATIO, POC: 2.4

## 2020-11-30 PROCEDURE — 85610 PROTHROMBIN TIME: CPT | Performed by: INTERNAL MEDICINE

## 2020-11-30 PROCEDURE — 99211 OFF/OP EST MAY X REQ PHY/QHP: CPT | Performed by: INTERNAL MEDICINE

## 2020-11-30 NOTE — PROGRESS NOTES
Ms. Abhijit Berry is here for management of anticoagulation for Afib. PMH also significant for GERD, anxiety, depression, cardiomyopathy, hypothyroid, chronic back pain, hx of breast cancer, pacemaker placement, back surgery, and osteopenia. Pt verifies dosing regimen. Pt denies s/s bleeding/bruising/swelling/SOB. No BRBPR. No melena. No changes in OTC/Herbal medications. Started on Rogaine and a collagen powder. Pt does not smoke and rarely drinks EtOH. She will be getting an ablation done on 12/21, she will stay on the warfarin. INR 2.4 is within the therapeutic range of 2-3. Recommend to continue 4.5mg daily, except 6 mg on Mondays. Patient has Warfarin 3mg tablets. Will continue to monitor and check INR in 5 weeks (due to having an INR check at the procedure). Dosing reminder card given with phone number, appointment date and time.   Return to clinic: 1/4 @ 1pm  Referring physician: Dr. Soledad Bauman

## 2021-01-04 ENCOUNTER — ANTI-COAG VISIT (OUTPATIENT)
Dept: PHARMACY | Age: 66
End: 2021-01-04
Payer: MEDICARE

## 2021-01-04 LAB — INR BLD: 1.8

## 2021-01-04 PROCEDURE — 99211 OFF/OP EST MAY X REQ PHY/QHP: CPT

## 2021-01-04 PROCEDURE — 85610 PROTHROMBIN TIME: CPT

## 2021-01-04 NOTE — PROGRESS NOTES
Ms. Danie Tolbert is here for management of anticoagulation for Afib. PMH also significant for GERD, anxiety, depression, cardiomyopathy, hypothyroid, chronic back pain, hx of breast cancer, pacemaker placement, back surgery, and osteopenia. Pt verifies dosing regimen. Pt denies s/s bleeding/bruising/swelling/SOB. No BRBPR. No melena. No changes in OTC/Herbal medications. Started on Rogaine and a collagen powder. Pt does not smoke and rarely drinks EtOH. She had an ablation done on 12/21, she stayed on the warfarin. Had some bleeding afterwards (spitting up blood) but was told to continue her warfarin. It has since resolved. No clear reason why INR is on lower end today, was hesitant to increase dose at all d/t recent bleeding. INR 1.8 is slightly below the therapeutic range of 2-3. Recommend to continue 4.5mg daily, except 6 mg on Mondays. Patient has Warfarin 3mg tablets. Will continue to monitor and check INR in 4 weeks   Dosing reminder card given with phone number, appointment date and time.   Return to clinic: 2/1 @ 1pm  Referring physician: Dr. Rishabh Cartagena, PharmD 1/4/21  2:10 PM

## 2021-02-01 ENCOUNTER — ANTI-COAG VISIT (OUTPATIENT)
Dept: PHARMACY | Age: 66
End: 2021-02-01
Payer: MEDICARE

## 2021-02-01 DIAGNOSIS — I48.0 PAROXYSMAL ATRIAL FIBRILLATION (HCC): ICD-10-CM

## 2021-02-01 LAB — INR BLD: 1.7

## 2021-02-01 PROCEDURE — 99211 OFF/OP EST MAY X REQ PHY/QHP: CPT

## 2021-02-01 PROCEDURE — 85610 PROTHROMBIN TIME: CPT

## 2021-02-01 NOTE — PROGRESS NOTES
Ms. Bianka Amor is here for management of anticoagulation for Afib. PMH also significant for GERD, anxiety, depression, cardiomyopathy, hypothyroid, chronic back pain, hx of breast cancer, pacemaker placement, back surgery, and osteopenia. Pt verifies dosing regimen. Pt denies s/s bleeding/bruising/swelling/SOB. No changes in OTC/Herbal medications. Started on Rogaine and a collagen powder. Pt does not smoke and rarely drinks EtOH. She had an ablation done on 12/21, she stayed on the warfarin. INR 1.7 is below the therapeutic range of 2-3. Recommend to inc to 4.5mg daily, except 6 mg on Mon/Thur. Patient has Warfarin 3mg tablets. Will continue to monitor and check INR in 4 weeks. Dosing reminder card given with phone number, appointment date and time.   Return to clinic: 2/15 @ [phone]  Referring physician: Dr. Rajeev Uriarte

## 2021-03-15 ENCOUNTER — ANTI-COAG VISIT (OUTPATIENT)
Dept: PHARMACY | Age: 66
End: 2021-03-15
Payer: MEDICARE

## 2021-03-15 DIAGNOSIS — I48.0 PAROXYSMAL ATRIAL FIBRILLATION (HCC): ICD-10-CM

## 2021-03-15 LAB — INTERNATIONAL NORMALIZATION RATIO, POC: 2.7

## 2021-03-15 PROCEDURE — 85610 PROTHROMBIN TIME: CPT | Performed by: INTERNAL MEDICINE

## 2021-03-15 PROCEDURE — 99211 OFF/OP EST MAY X REQ PHY/QHP: CPT | Performed by: INTERNAL MEDICINE

## 2021-03-15 NOTE — PROGRESS NOTES
Ms. Aby Philip is here for management of anticoagulation for Afib. PMH also significant for GERD, anxiety, depression, cardiomyopathy, hypothyroid, chronic back pain, hx of breast cancer, pacemaker placement, back surgery, and osteopenia. Pt verifies dosing regimen. Pt denies s/s bleeding/bruising/swelling/SOB. No changes in OTC/Herbal medications. Started on Rogaine and a collagen powder. Pt does not smoke and rarely drinks EtOH. INR 2.7 is within the therapeutic range of 2-3. Recommend to continue 4.5mg daily, except 6 mg on Mon/Thur. Patient has Warfarin 3mg tablets. Will continue to monitor and check INR in 4 weeks. Dosing reminder card given with phone number, appointment date and time.   Return to clinic: 4/12 @ 1300  Referring physician: Dr. Emily Andrade

## 2021-04-16 ENCOUNTER — HOSPITAL ENCOUNTER (EMERGENCY)
Age: 66
Discharge: LEFT BEFORE DISCHARGE | End: 2021-04-16
Payer: MEDICARE

## 2021-04-16 ENCOUNTER — APPOINTMENT (OUTPATIENT)
Dept: GENERAL RADIOLOGY | Age: 66
End: 2021-04-16
Payer: MEDICARE

## 2021-04-16 VITALS
SYSTOLIC BLOOD PRESSURE: 96 MMHG | RESPIRATION RATE: 20 BRPM | DIASTOLIC BLOOD PRESSURE: 58 MMHG | HEART RATE: 76 BPM | OXYGEN SATURATION: 100 % | TEMPERATURE: 97.5 F | BODY MASS INDEX: 24.1 KG/M2 | WEIGHT: 136 LBS

## 2021-04-16 DIAGNOSIS — F43.0 STRESS REACTION: ICD-10-CM

## 2021-04-16 DIAGNOSIS — R06.00 DYSPNEA, UNSPECIFIED TYPE: Primary | ICD-10-CM

## 2021-04-16 LAB
EKG ATRIAL RATE: 75 BPM
EKG DIAGNOSIS: NORMAL
EKG P AXIS: 87 DEGREES
EKG P-R INTERVAL: 172 MS
EKG Q-T INTERVAL: 442 MS
EKG QRS DURATION: 118 MS
EKG QTC CALCULATION (BAZETT): 493 MS
EKG R AXIS: -61 DEGREES
EKG T AXIS: 116 DEGREES
EKG VENTRICULAR RATE: 75 BPM

## 2021-04-16 PROCEDURE — 71045 X-RAY EXAM CHEST 1 VIEW: CPT

## 2021-04-16 PROCEDURE — 99283 EMERGENCY DEPT VISIT LOW MDM: CPT

## 2021-04-16 PROCEDURE — 93005 ELECTROCARDIOGRAM TRACING: CPT

## 2021-04-16 PROCEDURE — 2580000003 HC RX 258: Performed by: NURSE PRACTITIONER

## 2021-04-16 PROCEDURE — 93010 ELECTROCARDIOGRAM REPORT: CPT | Performed by: INTERNAL MEDICINE

## 2021-04-16 RX ORDER — 0.9 % SODIUM CHLORIDE 0.9 %
1000 INTRAVENOUS SOLUTION INTRAVENOUS ONCE
Status: COMPLETED | OUTPATIENT
Start: 2021-04-16 | End: 2021-04-16

## 2021-04-16 RX ADMIN — SODIUM CHLORIDE 1000 ML: 9 INJECTION, SOLUTION INTRAVENOUS at 14:26

## 2021-04-16 NOTE — ED NOTES
Patient difficult IV stick. Unable to obtain blood work or PIV access. Tearful, requesting no more sticks. Wanting to leave. NP updated.      Ty Townsend RN  04/16/21 5371

## 2021-04-16 NOTE — ED PROVIDER NOTES
Patient seen and evaluated by STANLEY. EKG: Normal sinus rhythm with a rate of 75. Left axis deviation. Normal intervals and durations. This has replaced paced rhythm from previous EKG on 5/21/2016.      Tejinder See DO  04/16/21 9430

## 2021-04-16 NOTE — ED NOTES
Pt left AMA prior to signing any paperwork. NP was previously at bedside explaining risk/benefit and pt reports she wants to leave AMA.      Sonal Vega RN  04/16/21 2094

## 2021-04-16 NOTE — FLOWSHEET NOTE
4.16/21/Spiritual support and discussion with patient and spouse. Patient wants to go home.  We had a friendly conversation and I told them that I would pray for her.     04/16/21 1428   Encounter Summary   Services provided to: Patient   Referral/Consult From: [facility] Spouse   Continue Visiting   (4.16.21/Spiritual support with discussion and prayers)   Complexity of Encounter Moderate   Length of Encounter 15 minutes   Spiritual Assessment Completed Yes   Spiritual/Rastafari   Type Spiritual support   Assessment Approachable   Intervention Active listening;Nurtured hope;Prayer;Sustaining presence/ Ministry of presence

## 2021-04-16 NOTE — ED PROVIDER NOTES
[facility]  ED  EMERGENCY DEPARTMENT ENCOUNTER        Pt Name: Nata Colon  MRN: [id number]  Quincygfcelia 1955  Date of evaluation: 4/16/2021  Provider: NAPOLEON Bray - CNP  PCP: Nubia Fraga MD    STANLEY. I have evaluated this patient. My supervising physician was available for consultation. CHIEF COMPLAINT       Chief Complaint   Patient presents with    Shortness of Breath     started 30 min ago while driving, pt sts \" cipriano had a rough day, i think its a panic attack but i have alot of problems like congestive heart failure\"       HISTORY OF PRESENT ILLNESS   (Location, Timing/Onset, Context/Setting, Quality, Duration, Modifying Factors, Severity, Associated Signs and Symptoms)  Note limiting factors. Nata Colon is a 76 y.o. female with medical history of allergic rhinitis, GERD, bilateral cataracts, cervical degenerative disc disease, depression, left breast cancer with left mastectomy and bone marrow transplant, osteopenia, vitamin B12 deficiency, cardiomyopathy, neuropathy, degenerative disc disease lumbosacral, pneumonia, hypothyroidism, echo with EF 30%, AICD, cardiac tamponade, DVT, PE, A. fib, CHF, cholecystectomy, hysterectomy who presents to the ED with complaints of feeling anxious and panicky that occurred approximately 45 minutes prior to arrival.  Patient states she was riding in the car with her  whenever she felt very overwhelmed. Her  pulled the car over and gave her a Tylenol without much relief of symptoms. States that she felt like she was breathing fast and felt very anxious. She did feel some tingling into her left upper extremity, although states that she does have a history of cervical radiculopathy and this does seem like her baseline. She states that she is feeling much better now, although only feels fatigued. She does believe that she had an panic attack as she has felt very overwhelmed today.   She is due to have knee surgery, although is waiting for cardiac clearance. She had just gotten cardiac clearance and was feeling anxious about setting up her knee surgery. She does note that she was feeling very anxious about events going on at home and had difficulty returning an item from the store. She does have ongoing anxiety and panic attacks and has been followed by her primary care doctor. She was recently started on fluoxetine 20 mg on 4/12/2021 to help with her symptoms. She does note to having some weight gain of approximately 2 to 3 pounds and had been taking her Lasix to assist with pulling off the fluid. She occasionally notes an indentation from her sock to her bilateral ankles. I was asking the patient about cardiac testing and she said everything she has done is usually at [facility], although she did not feel like driving that far today. She denies any associated nausea, vomiting, diarrhea, chest pain, palpitations, lightheaded, dizzy, syncope, chills, diaphoresis, fevers, rashes visual disturbance, ataxia, neck pain, back pain, cough, wheezing, or tinnitus. Nursing Notes were all reviewed and agreed with or any disagreements were addressed in the HPI. Review of medical records echo from 4/13/2021 at [facility] shows an EF of 33%. Diffuse hypokinesis. Cardiac Cath 6/12/2020  LVEDP 9 mmHg   Estimated Ejection Fraction  Wall motion 35%  Dilated globally hypokinetic LV   Valve function No mitral regurgitation or aortic stenosis       Coronary angiography    Dominance RCA   Left Main Angiographically normal   Left Anterior Descending Angiographically normal   Left Circumflex Angiographically normal with mild tortuosity   Right Angiographically normal   Closure Device 6 Wolof Angio-Seal       REVIEW OF SYSTEMS    (2-9 systems for level 4, 10 or more for level 5)     Review of Systems    Positives and Pertinent negatives as per HPI.   Except as noted above in the ROS, all other systems were reviewed and negative. PAST MEDICAL HISTORY     Past Medical History:   Diagnosis Date    Acute DVT (deep venous thrombosis) ([facility] [address] 7/2014    right arm     Allergic rhinitis     Anxiety     Atrial fibrillation (HCC)     B12 deficiency     Cancer [facility])     breast cancer    CARDIAC TAMPONADE 6/2014    following insertion ICD/defibrillator - window placed to remove fluid - 7/2014    Cardiomyopathy 1/20/11    Dr. Phillip Hammond  EF=37%. MUGA scan 2/11 - shows Normal EF=55% 4/2012 - shows EF of 25% with severe global hypokinesis    Cataracts     bilateral    CHF (congestive heart failure) (HCC)     Chronic back pain     Dr. Coreen Landry - he gives her percocet - monitors.  DDD (degenerative disc disease), lumbosacral 2013    multi - level     Depression     DJD (degenerative joint disease), cervical     bone spurs of neck and spine    GERD (gastroesophageal reflux disease)     H/O cold sores     HX: breast cancer 1992    left - spread to lymph nodes and Tx with left mastectomy - modified radical and chemo tx x 3 agents and radiation and bone marrow transplant.     Hypothyroid 1/21/14    ICD (implantable cardiac defibrillator) in place 6/10/2014    Neuropathy     left arm and hand from chemotherapy    Osteopenia 4/23/07    -1.3 in spine and -1.5 in hip    Pneumonia 1/2014    S/P cardiac cath 4/04    arteries ok - EF - 30% see report, Dr. Robert Girish     Past Surgical History:   Procedure Laterality Date    BACK SURGERY  6/19/7    BONE MARROW BIOPSY N/A     BONE MARROW TRANSPLANT N/A 1993    BREAST RECONSTRUCTION  1998    CARDIAC SURGERY  7/2014    had window placed to drain pericardial effusion/tamponade    CARPAL TUNNEL RELEASE  2000    right    CATARACT REMOVAL  1996    bilateral    CERVICAL SPINE SURGERY  6/09    Dr. Dilma Pool  2008    lap    COLONOSCOPY  12/29/10    HAND SURGERY  10/2011    tendon transfer to thumb on left hand    HYSTERECTOMY  1984    total    MASTECTOMY, MODIFIED RADICAL  1993    left    UPPER GASTROINTESTINAL ENDOSCOPY  12/29/10         CURRENTMEDICATIONS       Previous Medications    ALBUTEROL (PROAIR HFA) 108 (90 BASE) MCG/ACT INHALER    Inhale 2 puffs into the lungs every 6 hours as needed for Wheezing. AZITHROMYCIN (ZITHROMAX) 250 MG TABLET    Per package instructions    CARVEDILOL (COREG) 3.125 MG TABLET    Take 3.125 mg by mouth 2 times daily (with meals) 1/2 tab in the morning and 1/2 tab at night    CLONAZEPAM (KLONOPIN) 1 MG TABLET    Take 2 mg by mouth nightly     DICLOXACILLIN (DYNAPEN) 250 MG CAPSULE    Take 250 mg by mouth daily    DULOXETINE (CYMBALTA) 30 MG CAPSULE    Take 30 mg by mouth daily    FAMCICLOVIR (FAMVIR) 500 MG TABLET    Take 1 tablet by mouth 3 times daily as needed. FLUTICASONE (FLONASE) 50 MCG/ACT NASAL SPRAY    2 sprays by Nasal route daily    FOLIC ACID (FOLVITE) 1 MG TABLET    TAKE 1 TABLET EVERY DAY    FUROSEMIDE (LASIX) 20 MG TABLET    Take 20 mg by mouth daily. LEVOTHYROXINE (SYNTHROID) 25 MCG TABLET    TAKE 1 TABLET EVERY DAY    LISINOPRIL (PRINIVIL;ZESTRIL) 2.5 MG TABLET    Take 1 tablet by mouth daily. OXYCODONE-ACETAMINOPHEN (PERCOCET) 5-325 MG PER TABLET    1-2 pills every 4 hours as needed for pain. PILOCARPINE (SALAGEN) 5 MG TABLET    Take 5 mg by mouth 3 times daily    POLYETHYLENE GLYCOL (GLYCOLAX) PACKET    Take 17 g by mouth daily    SPIRONOLACTONE (ALDACTONE) 25 MG TABLET    Take 25 mg by mouth daily    VITAMIN D (CHOLECALCIFEROL) 1000 UNIT TABS TABLET    Take 1,000 Units by mouth daily.     WARFARIN (COUMADIN) 3 MG TABLET    Take 4.5 mg (3 mg x 1.5) on Mon, Fri; 6 mg (3 mg x 2) all other days         ALLERGIES     Hydromorphone, Codeine, Levofloxacin, Methocarbamol, Morphine, Morphine and related, Other, Sulfa antibiotics, and Tape [adhesive tape]    FAMILYHISTORY       Family History   Problem Relation Age of Onset    Arthritis Mother         rheumatoid    Diabetes Mother         II   Sara.Geoffrey Reviewed   CBC WITH AUTO DIFFERENTIAL   COMPREHENSIVE METABOLIC PANEL   URINALYSIS   TROPONIN   PROTIME-INR   TSH WITH REFLEX   BRAIN NATRIURETIC PEPTIDE       All other labs were within normal range or not returned as of this dictation. EKG: All EKG's are interpreted by the Emergency Department Physician in the absence of a cardiologist.  Please see their note for interpretation of EKG. RADIOLOGY:   Non-plain film images such as CT, Ultrasound and MRI are read by the radiologist. Plain radiographic images are visualized and preliminarily interpreted by the ED Provider with the below findings:        Interpretation per the Radiologist below, if available at the time of this note:    XR CHEST PORTABLE   Final Result   Increased opacification of the left mid and lower lung zone in a patient with   a known breast implant. Change from prior imaging may imply underlying   airspace process in the left lower lobe. Consider a follow-up CT of the   chest for further evaluation. CT CHEST PULMONARY EMBOLISM W CONTRAST    (Results Pending)     Xr Chest Portable    Result Date: 4/16/2021  EXAMINATION: ONE XRAY VIEW OF THE CHEST 4/16/2021 1:38 pm COMPARISON: 05/21/2016 HISTORY: ORDERING SYSTEM PROVIDED HISTORY: chest pain TECHNOLOGIST PROVIDED HISTORY: Reason for exam:->chest pain Reason for Exam: CHEST PAIN Acuity: Acute Type of Exam: Initial FINDINGS: The heart, mediastinum and pulmonary vascularity are normal.  The right lung is clear. Dense opacification is demonstrated overlying the left mid and lower lung zone. This appears to correspond to a breast implant in a patient with a prior history of mastectomy. Increased density from prior radiograph may represent some calcification of or around the implant. Underlying airspace changes cannot be well evaluated on this portable radiograph. No evidence of pneumothorax. No skeletal abnormality.      Increased opacification of the left mid and lower lung zone in a patient with a known breast implant. Change from prior imaging may imply underlying airspace process in the left lower lobe. Consider a follow-up CT of the chest for further evaluation. PROCEDURES   Unless otherwise noted below, none     Procedures    CRITICAL CARE TIME   N/A    CONSULTS:  None      EMERGENCY DEPARTMENT COURSE and DIFFERENTIAL DIAGNOSIS/MDM:   Vitals:    Vitals:    04/16/21 1329   BP: (!) 96/58   Pulse: 76   Resp: 20   Temp: 97.5 °F (36.4 °C)   TempSrc: Oral   SpO2: 100%   Weight: 136 lb (61.7 kg)       Patient was given the following medications:  Medications   0.9 % sodium chloride bolus (1,000 mLs Intravenous New Bag 4/16/21 1426)           Pertinent Labs & Imaging studies reviewed. (See chart for details)   -  Patient seen and evaluated in the emergency department. -  Triage and nursing notes reviewed and incorporated. -  Old chart records reviewed and incorporated. -  Patient case discussed with attending physician,  Dr. Sho Ruiz  -  Differential diagnosis includes:  CVA, MI, pneumonia, pleural effusion, costochondritis, pericarditis, cardiac tamponade, dehydration, sepsis, unstable angina, PE, anxiety, panic attack, depression, thyroid storm, UTI, dissection, venous thrombus embolism, ICH, SAH, SDH, versus COVID-19  -  Work-up included:  See above CBC, CMP, UA, troponin, BNP, TSH, INR, EKG, CXR, CT chest pulmonary embolism  -  ED treatment included:  Saline  - Consults: None   -  Results discussed with patient. Labs show  CXR with increased opacification of the mid and lower lung zone in a patient with a known breast implant. Change from prior imaging may imply underlying airspace process in the left lower lobe. Consider follow-up CT of the chest for further evaluation. Patient was attempted to be stuck numerous times to obtain IV access and blood work. Fluids were started it was determined that the IV had infiltrated.   At that point patient states that the nurses

## 2021-04-26 ENCOUNTER — ANTI-COAG VISIT (OUTPATIENT)
Dept: PHARMACY | Age: 66
End: 2021-04-26
Payer: MEDICARE

## 2021-04-26 DIAGNOSIS — I48.0 PAROXYSMAL ATRIAL FIBRILLATION (HCC): ICD-10-CM

## 2021-04-26 LAB — INTERNATIONAL NORMALIZATION RATIO, POC: 2.5

## 2021-04-26 PROCEDURE — 99211 OFF/OP EST MAY X REQ PHY/QHP: CPT | Performed by: INTERNAL MEDICINE

## 2021-04-26 PROCEDURE — 85610 PROTHROMBIN TIME: CPT | Performed by: INTERNAL MEDICINE

## 2021-04-26 NOTE — PROGRESS NOTES
Ms. Lydia Timmons is here for management of anticoagulation for Afib. PMH also significant for GERD, anxiety, depression, cardiomyopathy, hypothyroid, chronic back pain, hx of breast cancer, pacemaker placement, back surgery, and osteopenia. Pt verifies dosing regimen. Pt denies s/s bleeding/bruising/swelling/SOB. No changes in OTC/Herbal medications. Started on Rogaine and a collagen powder. Pt does not smoke and rarely drinks EtOH. She has knee replacement surgery on 5/25. She reports not needing a bridge with lovenox. INR 2.5 is within the therapeutic range of 2-3. Recommend to continue 4.5mg daily, except 6 mg on Mon/Thur. Patient has Warfarin 3mg tablets. Will continue to monitor and check INR in 4 weeks. Dosing reminder card given with phone number, appointment date and time.   Return to clinic: 5/17 @ [phone]  Referring physician: Dr. Zoe Rios

## 2021-05-11 ENCOUNTER — ANTI-COAG VISIT (OUTPATIENT)
Dept: PHARMACY | Age: 66
End: 2021-05-11
Payer: MEDICARE

## 2021-05-11 DIAGNOSIS — I48.0 PAROXYSMAL ATRIAL FIBRILLATION (HCC): ICD-10-CM

## 2021-05-11 LAB — INTERNATIONAL NORMALIZATION RATIO, POC: 2.6

## 2021-05-11 PROCEDURE — 99211 OFF/OP EST MAY X REQ PHY/QHP: CPT

## 2021-05-11 PROCEDURE — 85610 PROTHROMBIN TIME: CPT

## 2021-05-11 NOTE — PROGRESS NOTES
Ms. Deann Matute is here for management of anticoagulation for Afib. PMH also significant for GERD, anxiety, depression, cardiomyopathy, hypothyroid, chronic back pain, hx of breast cancer, pacemaker placement, back surgery, and osteopenia. Pt verifies dosing regimen. Pt denies s/s bleeding/bruising/swelling/SOB. No changes in OTC/Herbal medications. Started on Rogaine and a collagen powder. Pt does not smoke and rarely drinks EtOH. She has knee replacement surgery on 5/25. She reports not needing a bridge with lovenox. INR 2.6 is within the therapeutic range of 2-3. Recommend to continue 4.5mg daily, except 6 mg on Mon/Thur. Patient has Warfarin 3mg tablets. Will continue to monitor and check INR in 4 weeks. Dosing reminder card given with phone number, appointment date and time. Return to clinic: Patient will call when she has a better idea after the knee surgery.   Referring physician: Dr. Alise Mark PharmD  5/11/2021 at 1:33 PM

## 2021-06-07 ENCOUNTER — ANTI-COAG VISIT (OUTPATIENT)
Dept: PHARMACY | Age: 66
End: 2021-06-07
Payer: MEDICARE

## 2021-06-07 DIAGNOSIS — I48.0 PAROXYSMAL ATRIAL FIBRILLATION (HCC): Primary | ICD-10-CM

## 2021-06-07 LAB — INR BLD: 3

## 2021-06-07 PROCEDURE — 99211 OFF/OP EST MAY X REQ PHY/QHP: CPT | Performed by: INTERNAL MEDICINE

## 2021-06-07 PROCEDURE — 85610 PROTHROMBIN TIME: CPT | Performed by: INTERNAL MEDICINE

## 2021-06-07 NOTE — PROGRESS NOTES
Ms. Christopher Wiley is here for management of anticoagulation for Afib. PMH also significant for GERD, anxiety, depression, cardiomyopathy, hypothyroid, chronic back pain, hx of breast cancer, pacemaker placement, back surgery, and osteopenia. Pt verifies dosing regimen. Pt denies s/s bleeding/bruising/swelling/SOB. No changes in OTC/Herbal medications. Started on Rogaine and a collagen powder. Pt does not smoke and rarely drinks EtOH. She has knee replacement surgery on 5/25. She reports not needing a bridge with lovenox. Patient currently taking 6 mg M/W/F and 4.5 mg all other days while at rehab- we will change back to previous dose. INR 3.0 is within the therapeutic range of 2-3. Recommend to resume 4.5mg daily, except 6 mg on Mon/Thur. Patient has Warfarin 3mg tablets. Will continue to monitor and check INR in 4 weeks. Dosing reminder card given with phone number, appointment date and time. Return to clinic: 6/21 at 2:00 PM  Referring physician: Dr. Meche Fernando   PharmD Candidate 2022    I have seen the patient and reviewed the progress note written by the PharmD Candidate. I agree with this assessment and plan.    Chang Washburn, [facility], PharmD 6/7/2021 3:17 PM

## 2021-06-21 ENCOUNTER — ANTI-COAG VISIT (OUTPATIENT)
Dept: PHARMACY | Age: 66
End: 2021-06-21
Payer: MEDICARE

## 2021-06-21 DIAGNOSIS — I48.0 PAROXYSMAL ATRIAL FIBRILLATION (HCC): Primary | ICD-10-CM

## 2021-06-21 LAB — INTERNATIONAL NORMALIZATION RATIO, POC: 4.4

## 2021-06-21 PROCEDURE — 85610 PROTHROMBIN TIME: CPT | Performed by: INTERNAL MEDICINE

## 2021-06-21 PROCEDURE — 99211 OFF/OP EST MAY X REQ PHY/QHP: CPT | Performed by: INTERNAL MEDICINE

## 2021-06-21 NOTE — PROGRESS NOTES
Ms. Dung Gutierrez is here for management of anticoagulation for Afib. PMH also significant for GERD, anxiety, depression, cardiomyopathy, hypothyroid, chronic back pain, hx of breast cancer, pacemaker placement, back surgery, and osteopenia. Pt verifies dosing regimen. Pt denies s/s bleeding/bruising/swelling/SOB. No changes in Rx/OTC/Herbal medications. Pt does not smoke and rarely drinks EtOH. She has knee replacement surgery on 5/25. She reports leaving the rehab facility on 6/16. She wasn't sure if she was being given the proper dose while she was there. She appetite has been down, she has lost weight and she isn't eating much vit k. INR 4.4 is above the therapeutic range of 2-3. Recommend to hold today, take 3mg tomorrow, the reduce dose to 4.5mg daily until seen. She will work on her diet. Patient has Warfarin 3mg tablets. Will continue to monitor and check INR in 1 week. Dosing reminder card given with phone number, appointment date and time.   Return to clinic: 6/28 at [address]  Referring physician: Dr. Jil Carroll

## 2021-06-28 ENCOUNTER — ANTI-COAG VISIT (OUTPATIENT)
Dept: PHARMACY | Age: 66
End: 2021-06-28
Payer: MEDICARE

## 2021-06-28 DIAGNOSIS — I48.0 PAROXYSMAL ATRIAL FIBRILLATION (HCC): Primary | ICD-10-CM

## 2021-06-28 LAB — INR BLD: 2.8

## 2021-06-28 PROCEDURE — 85610 PROTHROMBIN TIME: CPT | Performed by: INTERNAL MEDICINE

## 2021-06-28 PROCEDURE — 99211 OFF/OP EST MAY X REQ PHY/QHP: CPT | Performed by: INTERNAL MEDICINE

## 2021-06-28 NOTE — PROGRESS NOTES
Ms. Gisell Magana is here for management of anticoagulation for Afib. PMH also significant for GERD, anxiety, depression, cardiomyopathy, hypothyroid, chronic back pain, hx of breast cancer, pacemaker placement, back surgery, and osteopenia. Pt verifies dosing regimen. Pt denies s/s bleeding/bruising/swelling/SOB. No changes in Rx/OTC/Herbal medications. Pt does not smoke and rarely drinks EtOH. She has knee replacement surgery on 5/25. She reports leaving the rehab facility on 6/16. She wasn't sure if she was being given the proper dose while she was there. She appetite has been down, she has lost weight and she isn't eating much vit k. INR 2.8 is above the therapeutic range of 2-3. Recommend to take 4.5 mg daily. Patient has Warfarin 3mg tablets. Will continue to monitor and check INR in 1 week. Dosing reminder card given with phone number, appointment date and time. Return to clinic: 7/19 at 1:00 pm  Referring physician: Dr. Zain Arnett  PharmD Candidate 2022    I have seen the patient and reviewed the progress note written by the PharmD Candidate. I agree with this assessment and plan.    Renetta Staton, [address] [place] [address], PharmD 6/28/2021 1:09 PM

## 2021-07-19 ENCOUNTER — ANTI-COAG VISIT (OUTPATIENT)
Dept: PHARMACY | Age: 66
End: 2021-07-19
Payer: COMMERCIAL

## 2021-07-19 DIAGNOSIS — I48.0 PAROXYSMAL ATRIAL FIBRILLATION (HCC): Primary | ICD-10-CM

## 2021-07-19 LAB — INR BLD: 3.6

## 2021-07-19 PROCEDURE — 99211 OFF/OP EST MAY X REQ PHY/QHP: CPT

## 2021-07-19 PROCEDURE — 85610 PROTHROMBIN TIME: CPT

## 2021-07-19 NOTE — PROGRESS NOTES
Ms. Jo Ann Messina is here for management of anticoagulation for Afib. PMH also significant for GERD, anxiety, depression, cardiomyopathy, hypothyroid, chronic back pain, hx of breast cancer, pacemaker placement, back surgery, and osteopenia. Pt verifies dosing regimen. Pt denies s/s bleeding/bruising/swelling/SOB. No changes in Rx/OTC/Herbal medications. Pt does not smoke and rarely drinks EtOH. No changes per patient    INR 3.6 is above the therapeutic range of 2-3. Recommend to hold today's dose, then decrease dose to 4.5 mg daily except 3 mg on Tues. Patient has Warfarin 3mg tablets. Will continue to monitor and check INR in 2 weeks. Dosing reminder card given with phone number, appointment date and time.   Return to clinic: 8/2 at 1:00 pm  Referring physician: Dr. Gustavo Ewing  PharmD Candidate 2022

## 2021-08-02 ENCOUNTER — ANTI-COAG VISIT (OUTPATIENT)
Dept: PHARMACY | Age: 66
End: 2021-08-02
Payer: COMMERCIAL

## 2021-08-02 DIAGNOSIS — I48.0 PAROXYSMAL ATRIAL FIBRILLATION (HCC): Primary | ICD-10-CM

## 2021-08-02 LAB — INR BLD: 3.2

## 2021-08-02 PROCEDURE — 85610 PROTHROMBIN TIME: CPT | Performed by: INTERNAL MEDICINE

## 2021-08-02 PROCEDURE — 99211 OFF/OP EST MAY X REQ PHY/QHP: CPT | Performed by: INTERNAL MEDICINE

## 2021-08-02 NOTE — PROGRESS NOTES
Ms. Tavo Bear is here for management of anticoagulation for Afib. PMH also significant for GERD, anxiety, depression, cardiomyopathy, hypothyroid, chronic back pain, hx of breast cancer, pacemaker placement, back surgery, and osteopenia. Pt verifies dosing regimen. Pt denies s/s bleeding/bruising/swelling/SOB. No changes in Rx/OTC/Herbal medications. Pt does not smoke and rarely drinks EtOH. No changes per patient    INR 3.2 is slightly above the therapeutic range of 2-3. Recommend to continue dose of 4.5 mg daily except 3 mg on Monday and Friday. Patient has warfarin 3 mg tablets. Recommend to add back 1-2 servings of VitK-containing foods per week. Will continue to monitor and check INR in 2 weeks. Dosing reminder card given with phone number, appointment date and time. Return to clinic: 8/23 at 1:00 pm  Referring physician: Dr. Manuel Gentile  PharmD Student 2022    I have seen the patient and reviewed the progress note written by the PharmD Candidate. I agree with this assessment and plan.    Miguel Stacy [facility], PharmD 8/2/2021 2:02 PM

## 2021-08-23 ENCOUNTER — ANTI-COAG VISIT (OUTPATIENT)
Dept: PHARMACY | Age: 66
End: 2021-08-23
Payer: COMMERCIAL

## 2021-08-23 DIAGNOSIS — I48.0 PAROXYSMAL ATRIAL FIBRILLATION (HCC): Primary | ICD-10-CM

## 2021-08-23 LAB — INR BLD: 3.6

## 2021-08-23 PROCEDURE — 99211 OFF/OP EST MAY X REQ PHY/QHP: CPT | Performed by: INTERNAL MEDICINE

## 2021-08-23 PROCEDURE — 85610 PROTHROMBIN TIME: CPT | Performed by: INTERNAL MEDICINE

## 2021-08-23 NOTE — PROGRESS NOTES
Ms. Lynda Harmon is here for management of anticoagulation for Afib. PMH also significant for GERD, anxiety, depression, cardiomyopathy, hypothyroid, chronic back pain, hx of breast cancer, pacemaker placement, back surgery, and osteopenia. Pt verifies dosing regimen. Pt denies s/s bleeding/bruising/swelling/SOB. No changes in Rx/OTC/Herbal medications. We had previously recommend to add back 1-2 servings of VitK-containing foods per week, she states she has done this. Pt does not smoke and rarely drinks EtOH. Pt reported eating more VitK greens. Pt was at rehab facility after knee surgery in May and warfarin tablets were mixed up. INR has been inconsistent since. INR 3.6 is above the therapeutic range of 2-3. Recommend to hold today, then decrease dose to 4.5 mg on Mon/Wed/Fri and 3 mg on all other days. Patient has warfarin 3 mg tablets. Will continue to monitor and check INR in 2 weeks. Dosing reminder card given with phone number, appointment date and time. Return to clinic: 9/7 at 1:00 pm  Referring physician: Dr. Ave Torres  PharmD Student 2022 8/23/2021 1:02 PM    I have seen the patient and reviewed the progress note written by the PharmD Candidate. I agree with this assessment and plan.    Diana Ba, [facility], PharmD 8/23/2021 1:58 PM

## 2021-09-07 ENCOUNTER — ANTI-COAG VISIT (OUTPATIENT)
Dept: PHARMACY | Age: 66
End: 2021-09-07
Payer: COMMERCIAL

## 2021-09-07 LAB — INR BLD: 2.4

## 2021-09-07 PROCEDURE — 85610 PROTHROMBIN TIME: CPT

## 2021-09-07 PROCEDURE — 99211 OFF/OP EST MAY X REQ PHY/QHP: CPT

## 2021-09-07 NOTE — PROGRESS NOTES
Ms. Jackie Chinchilla is here for management of anticoagulation for Afib. PMH also significant for GERD, anxiety, depression, cardiomyopathy, hypothyroid, chronic back pain, hx of breast cancer, pacemaker placement, back surgery, and osteopenia. Pt verifies dosing regimen. Pt denies s/s bleeding/bruising/swelling/SOB. No changes in Rx/OTC/Herbal medications. We had previously recommend to add back 1-2 servings of VitK-containing foods per week, she states she has done this. Pt does not smoke and rarely drinks EtOH. Pt was at rehab facility after knee surgery in May and warfarin tablets were mixed up. INR has been inconsistent since. Her  has also been in the hospital and she states her diet has been inconsistent. INR 2.4 is within the therapeutic range of 2-3. Recommend to continue with 4.5 mg on Mon/Wed/Fri and 3 mg on all other days. Patient has warfarin 3 mg tablets. Will continue to monitor and check INR in 2 weeks. Dosing reminder card given with phone number, appointment date and time.   Return to clinic: 9/20 at 1:00 pm  Referring physician: Dr. Tereza Perez, PharmD 9/7/21  11:43 AM

## 2021-09-20 ENCOUNTER — ANTI-COAG VISIT (OUTPATIENT)
Dept: PHARMACY | Age: 66
End: 2021-09-20
Payer: COMMERCIAL

## 2021-09-20 DIAGNOSIS — I48.0 PAROXYSMAL ATRIAL FIBRILLATION (HCC): Primary | ICD-10-CM

## 2021-09-20 LAB — INTERNATIONAL NORMALIZATION RATIO, POC: 2

## 2021-09-20 PROCEDURE — 99211 OFF/OP EST MAY X REQ PHY/QHP: CPT | Performed by: INTERNAL MEDICINE

## 2021-09-20 PROCEDURE — 85610 PROTHROMBIN TIME: CPT | Performed by: INTERNAL MEDICINE

## 2021-09-20 NOTE — PROGRESS NOTES
Ms. Joey Jenkins is here for management of anticoagulation for Afib. PMH also significant for GERD, anxiety, depression, cardiomyopathy, hypothyroid, chronic back pain, hx of breast cancer, pacemaker placement, back surgery, and osteopenia. Pt verifies dosing regimen. Pt denies s/s bleeding/bruising/swelling/SOB. No changes in Rx/OTC/Herbal medications. We had previously recommend to add back 1-2 servings of VitK-containing foods per week, she states she has done this. Pt does not smoke and rarely drinks EtOH. Pt was at rehab facility after knee surgery in May and warfarin tablets were mixed up. INR has been inconsistent since. Her  has also been in the hospital and she states her diet has been inconsistent. Scheduled for 2nd knee replacement on 11/19    INR 2.0 is within the therapeutic range of 2-3. Recommend to continue with 4.5 mg on Mon/Wed/Fri and 3 mg on all other days. Patient has warfarin 3 mg tablets. Will continue to monitor and check INR in 4 weeks. Dosing reminder card given with phone number, appointment date and time.   Return to clinic: 10/18 at 1:00 pm  Referring physician: Dr. Sneha Solis

## 2021-10-11 ENCOUNTER — ANTI-COAG VISIT (OUTPATIENT)
Dept: PHARMACY | Age: 66
End: 2021-10-11
Payer: COMMERCIAL

## 2021-10-11 DIAGNOSIS — I48.0 PAROXYSMAL ATRIAL FIBRILLATION (HCC): Primary | ICD-10-CM

## 2021-10-11 LAB — INTERNATIONAL NORMALIZATION RATIO, POC: 2

## 2021-10-11 PROCEDURE — 99211 OFF/OP EST MAY X REQ PHY/QHP: CPT | Performed by: INTERNAL MEDICINE

## 2021-10-11 PROCEDURE — 85610 PROTHROMBIN TIME: CPT | Performed by: INTERNAL MEDICINE

## 2021-10-11 NOTE — PROGRESS NOTES
Ms. Chidi Arora is here for management of anticoagulation for Afib. PMH also significant for GERD, anxiety, depression, cardiomyopathy, hypothyroid, chronic back pain, hx of breast cancer, pacemaker placement, back surgery, and osteopenia. Pt verifies dosing regimen. Pt denies s/s bleeding/bruising/swelling/SOB. No changes in Rx/OTC/Herbal medications. We had previously recommend to add back 1-2 servings of VitK-containing foods per week, she states she has done this. Pt does not smoke and rarely drinks EtOH. Scheduled for 2nd knee replacement on 11/19    INR 2.0 is within the therapeutic range of 2-3. Recommend to continue with 4.5 mg on Mon/Wed/Fri and 3 mg on all other days. Patient has warfarin 3 mg tablets. Will continue to monitor and check INR in 4 weeks. Dosing reminder card given with phone number, appointment date and time.   Return to clinic: 11/8 at 1:00 pm  Referring physician: Dr. Rehan Wallis

## 2021-11-08 ENCOUNTER — ANTI-COAG VISIT (OUTPATIENT)
Dept: PHARMACY | Age: 66
End: 2021-11-08
Payer: COMMERCIAL

## 2021-11-08 DIAGNOSIS — I48.0 PAROXYSMAL ATRIAL FIBRILLATION (HCC): Primary | ICD-10-CM

## 2021-11-08 LAB — INTERNATIONAL NORMALIZATION RATIO, POC: 1.9

## 2021-11-08 PROCEDURE — 85610 PROTHROMBIN TIME: CPT | Performed by: INTERNAL MEDICINE

## 2021-11-08 PROCEDURE — 99211 OFF/OP EST MAY X REQ PHY/QHP: CPT | Performed by: INTERNAL MEDICINE

## 2021-11-08 NOTE — PROGRESS NOTES
Ms. Kvng Crespo is here for management of anticoagulation for Afib. PMH also significant for GERD, anxiety, depression, cardiomyopathy, hypothyroid, chronic back pain, hx of breast cancer, pacemaker placement, back surgery, and osteopenia. Pt verifies dosing regimen. Pt denies s/s bleeding/bruising/swelling/SOB. No changes in Rx/OTC/Herbal medications. We had previously recommend to add back 1-2 servings of VitK-containing foods per week, she states she has done this. Pt does not smoke and rarely drinks EtOH. Scheduled for 2nd knee replacement on 11/19, will be going to a rehab facility afterwards. INR 1.9 is within the therapeutic range of 2-3. Recommend to continue with 4.5 mg on Mon/Wed/Fri and 3 mg on all other days. Patient has warfarin 3 mg tablets. Will continue to monitor and check INR in 4 weeks. Dosing reminder card given with phone number, appointment date and time.   Return to clinic: will call when she is out of rehab  Referring physician: Dr. Audra Brenner

## 2021-12-07 ENCOUNTER — ANTI-COAG VISIT (OUTPATIENT)
Dept: PHARMACY | Age: 66
End: 2021-12-07

## 2021-12-07 LAB — INR BLD: 1.2

## 2021-12-07 NOTE — PROGRESS NOTES
Ms. Bryan Jameson is here for management of anticoagulation for Afib. PMH also significant for GERD, anxiety, depression, cardiomyopathy, hypothyroid, chronic back pain, hx of breast cancer, pacemaker placement, back surgery, and osteopenia. Pt verifies dosing regimen. Pt denies s/s bleeding/bruising/swelling/SOB. No changes in Rx/OTC/Herbal medications. We had previously recommend to add back 1-2 servings of VitK-containing foods per week, she states she has done this. Pt does not smoke and rarely drinks EtOH. Pt has [facility] RN checking INR and reporting to us. She has been in rehab since knee replacement last month, got back home on Sunday. Pt reports to RN that the facility had a hard time getting her INR therapeutic while she was there. She remembers that early last week it was supratherapeutic. (maybe doses were held for a few days and this is the reason for low INR today?)    INR 1.2 is below the therapeutic range of 2-3. Recommend to take 4.5 mg today and tomorrow, 3 mg on Thurs, then RN will check INR again on Fri. (Normal dose is 4.5 mg on Mon/Wed/Fri and 3 mg on all other days)  Patient has warfarin 3 mg tablets. Will continue to monitor and check INR in 3 days   Dosing reminder card given with phone number, appointment date and time.   Return to clinic: [facility]i Dillard RN to check ~12/10  Referring physician: Dr. Daria Calvillo, PharmD 12/7/21  2:17 PM

## 2021-12-10 ENCOUNTER — ANTI-COAG VISIT (OUTPATIENT)
Dept: PHARMACY | Age: 66
End: 2021-12-10

## 2021-12-10 DIAGNOSIS — I48.0 PAROXYSMAL ATRIAL FIBRILLATION (HCC): Primary | ICD-10-CM

## 2021-12-10 LAB — INTERNATIONAL NORMALIZATION RATIO, POC: 2

## 2021-12-10 NOTE — PROGRESS NOTES
Ms. Jazlyn Katz is here for management of anticoagulation for Afib. PMH also significant for GERD, anxiety, depression, cardiomyopathy, hypothyroid, chronic back pain, hx of breast cancer, pacemaker placement, back surgery, and osteopenia. Pt verifies dosing regimen. Pt denies s/s bleeding/bruising/swelling/SOB. No changes in Rx/OTC/Herbal medications. We had previously recommend to add back 1-2 servings of VitK-containing foods per week, she states she has done this. Pt does not smoke and rarely drinks EtOH.    12/7: Pt has [facility] RN checking INR and reporting to us. She has been in rehab since knee replacement last month, got back home on Sunday. Pt reports to RN that the facility had a hard time getting her INR therapeutic while she was there. She remembers that early last week it was supratherapeutic. (maybe doses were held for a few days and this is the reason for low INR today?)    12/10: INR is in range today- will continue with normal dosing. RN will recheck next Tuesday    INR 2.0 is within the therapeutic range of 2-3. Recommend to continue normal dose of 4.5 mg on Mon/Wed/Fri and 3 mg on all other days  Patient has warfarin 3 mg tablets. Will continue to monitor and check INR in 4 days   Dosing reminder card given with phone number, appointment date and time.   Return to clinic: [facility] RN to check ~12/14  Referring physician: Dr. Delon Curtis, PharmD  12/10/2021 at 11:33 AM

## 2022-01-03 ENCOUNTER — ANTI-COAG VISIT (OUTPATIENT)
Dept: PHARMACY | Age: 67
End: 2022-01-03
Payer: COMMERCIAL

## 2022-01-03 DIAGNOSIS — I48.0 PAROXYSMAL ATRIAL FIBRILLATION (HCC): Primary | ICD-10-CM

## 2022-01-03 LAB — INTERNATIONAL NORMALIZATION RATIO, POC: 2.7

## 2022-01-03 PROCEDURE — 99211 OFF/OP EST MAY X REQ PHY/QHP: CPT | Performed by: INTERNAL MEDICINE

## 2022-01-03 PROCEDURE — 85610 PROTHROMBIN TIME: CPT | Performed by: INTERNAL MEDICINE

## 2022-01-03 NOTE — PROGRESS NOTES
Ms. Saintclair Kelp is here for management of anticoagulation for Afib. PMH also significant for GERD, anxiety, depression, cardiomyopathy, hypothyroid, chronic back pain, hx of breast cancer, pacemaker placement, back surgery, and osteopenia. Pt verifies dosing regimen. Pt denies s/s bleeding/bruising/swelling/SOB. No changes in Rx/OTC/Herbal medications. We had previously recommend to add back 1-2 servings of VitK-containing foods per week, she states she has done this. Pt does not smoke and rarely drinks EtOH. INR 2.7 is within the therapeutic range of 2-3. Recommend to continue dose of 3 mg on Mon/Wed/Fri and 4.5 mg on all other days  Patient has warfarin 3 mg tablets. Will continue to monitor and check INR in 4 weeks. Dosing reminder card given with phone number, appointment date and time.   Return to clinic: 1/31 at 100  Referring physician: Dr. Dustin Wright

## 2022-01-31 ENCOUNTER — ANTI-COAG VISIT (OUTPATIENT)
Dept: PHARMACY | Age: 67
End: 2022-01-31
Payer: COMMERCIAL

## 2022-01-31 DIAGNOSIS — I48.0 PAROXYSMAL ATRIAL FIBRILLATION (HCC): Primary | ICD-10-CM

## 2022-01-31 LAB — INTERNATIONAL NORMALIZATION RATIO, POC: 2.2

## 2022-01-31 PROCEDURE — 85610 PROTHROMBIN TIME: CPT | Performed by: INTERNAL MEDICINE

## 2022-01-31 PROCEDURE — 99211 OFF/OP EST MAY X REQ PHY/QHP: CPT | Performed by: INTERNAL MEDICINE

## 2022-01-31 NOTE — PROGRESS NOTES
Ms. Colunga  is here for management of anticoagulation for Afib. PMH also significant for GERD, anxiety, depression, cardiomyopathy, hypothyroid, chronic back pain, hx of breast cancer, pacemaker placement, back surgery, and osteopenia. Pt verifies dosing regimen. Pt denies s/s bleeding/bruising/swelling/SOB. No changes in Rx/OTC/Herbal medications. We had previously recommend to add back 1-2 servings of VitK-containing foods per week, she states she has done this. Pt does not smoke and rarely drinks EtOH. INR 2.2 is within the therapeutic range of 2-3. Recommend to continue dose of 3 mg on Mon/Wed/Fri and 4.5 mg on all other days  Patient has warfarin 3 mg tablets. Will continue to monitor and check INR in 4 weeks. Dosing reminder card given with phone number, appointment date and time.   Return to clinic: 2/28 at 100  Referring physician: Dr. Hanna Gregorio

## 2022-03-17 RX ORDER — WARFARIN SODIUM 3 MG/1
TABLET ORAL
Qty: 125 TABLET | Refills: 3 | Status: SHIPPED | OUTPATIENT
Start: 2022-03-17

## 2022-03-28 ENCOUNTER — ANTI-COAG VISIT (OUTPATIENT)
Dept: PHARMACY | Age: 67
End: 2022-03-28
Payer: COMMERCIAL

## 2022-03-28 DIAGNOSIS — I48.0 PAROXYSMAL ATRIAL FIBRILLATION (HCC): Primary | ICD-10-CM

## 2022-03-28 LAB — INTERNATIONAL NORMALIZATION RATIO, POC: 2.5

## 2022-03-28 PROCEDURE — 99211 OFF/OP EST MAY X REQ PHY/QHP: CPT | Performed by: INTERNAL MEDICINE

## 2022-03-28 PROCEDURE — 85610 PROTHROMBIN TIME: CPT | Performed by: INTERNAL MEDICINE

## 2022-03-28 NOTE — PROGRESS NOTES
Ms. Yris Caal is here for management of anticoagulation for Afib. PMH also significant for GERD, anxiety, depression, cardiomyopathy, hypothyroid, chronic back pain, hx of breast cancer, pacemaker placement, back surgery, and osteopenia. Pt verifies dosing regimen. Pt denies s/s bleeding/bruising/swelling/SOB. No changes in Rx/OTC/Herbal medications. We had previously recommend to add back 1-2 servings of VitK-containing foods per week, she states she has done this. Pt does not smoke and rarely drinks EtOH. INR 2.5 is within the therapeutic range of 2-3. Recommend to continue dose of 3 mg on Mon/Wed/Fri and 4.5 mg on all other days  Patient has warfarin 3 mg tablets. Will continue to monitor and check INR in 4 weeks. Dosing reminder card given with phone number, appointment date and time.   Return to clinic: 4/25 at 100  Referring physician: Dr. Anita Salas

## 2022-04-25 ENCOUNTER — ANTI-COAG VISIT (OUTPATIENT)
Dept: PHARMACY | Age: 67
End: 2022-04-25
Payer: COMMERCIAL

## 2022-04-25 DIAGNOSIS — I48.0 PAROXYSMAL ATRIAL FIBRILLATION (HCC): Primary | ICD-10-CM

## 2022-04-25 LAB — INTERNATIONAL NORMALIZATION RATIO, POC: 2.1

## 2022-04-25 PROCEDURE — 85610 PROTHROMBIN TIME: CPT | Performed by: INTERNAL MEDICINE

## 2022-04-25 PROCEDURE — 99211 OFF/OP EST MAY X REQ PHY/QHP: CPT | Performed by: INTERNAL MEDICINE

## 2022-04-25 NOTE — PROGRESS NOTES
Ms. Paula Abdalla is here for management of anticoagulation for Afib. PMH also significant for GERD, anxiety, depression, cardiomyopathy, hypothyroid, chronic back pain, hx of breast cancer, pacemaker placement, back surgery, and osteopenia. Pt verifies dosing regimen. Pt denies s/s bleeding/bruising/swelling/SOB. No changes in Rx/OTC/Herbal medications. Pt is still having a couple servings of greens per week. Pt does not smoke and rarely drinks EtOH. INR 2.1 is within the therapeutic range of 2-3. Recommend to continue dose of 3 mg on Mon/Wed/Fri and 4.5 mg on all other days  Patient has warfarin 3 mg tablets. Will continue to monitor and check INR in 4 weeks. Dosing reminder card given with phone number, appointment date and time.   Return to clinic: 5/23 at 100  Referring physician: Dr. Seema Koo

## 2022-05-23 ENCOUNTER — ANTI-COAG VISIT (OUTPATIENT)
Dept: PHARMACY | Age: 67
End: 2022-05-23
Payer: COMMERCIAL

## 2022-05-23 DIAGNOSIS — I48.0 PAROXYSMAL ATRIAL FIBRILLATION (HCC): Primary | ICD-10-CM

## 2022-05-23 LAB — INTERNATIONAL NORMALIZATION RATIO, POC: 3.5

## 2022-05-23 PROCEDURE — 85610 PROTHROMBIN TIME: CPT | Performed by: INTERNAL MEDICINE

## 2022-05-23 PROCEDURE — 99211 OFF/OP EST MAY X REQ PHY/QHP: CPT | Performed by: INTERNAL MEDICINE

## 2022-05-23 NOTE — PROGRESS NOTES
Ms. Lynda Harmon is here for management of anticoagulation for Afib. PMH also significant for GERD, anxiety, depression, cardiomyopathy, hypothyroid, chronic back pain, hx of breast cancer, pacemaker placement, back surgery, and osteopenia. Pt verifies dosing regimen. Pt denies s/s bleeding/bruising/swelling/SOB. No changes in Rx/OTC/Herbal medications. Pt is still having a couple servings of greens per week. Pt does not smoke and rarely drinks EtOH. Pt reports that she will be going off of warfarin for 2 weeks, starting on 5/27 for spinal injection then a colonoscopy. INR 2.1 is within the therapeutic range of 2-3. Recommend to hold today then continue dose of 3 mg on Mon/Wed/Fri and 4.5 mg on all other days - except for holding for procedures as noted before. Patient has warfarin 3 mg tablets. Will continue to monitor and check INR in 3 weeks, due to procedures. Dosing reminder card given with phone number, appointment date and time.   Return to clinic: 6/13 at 100  Referring physician: Dr. Fayetta Boeck

## 2022-06-13 ENCOUNTER — ANTI-COAG VISIT (OUTPATIENT)
Dept: PHARMACY | Age: 67
End: 2022-06-13
Payer: COMMERCIAL

## 2022-06-13 DIAGNOSIS — I48.0 PAROXYSMAL ATRIAL FIBRILLATION (HCC): Primary | ICD-10-CM

## 2022-06-13 LAB — INTERNATIONAL NORMALIZATION RATIO, POC: 1.1

## 2022-06-13 PROCEDURE — 99211 OFF/OP EST MAY X REQ PHY/QHP: CPT | Performed by: INTERNAL MEDICINE

## 2022-06-13 PROCEDURE — 85610 PROTHROMBIN TIME: CPT | Performed by: INTERNAL MEDICINE

## 2022-06-13 NOTE — PROGRESS NOTES
Ms. Julia Stephens is here for management of anticoagulation for Afib. PMH also significant for GERD, anxiety, depression, cardiomyopathy, hypothyroid, chronic back pain, hx of breast cancer, pacemaker placement, back surgery, and osteopenia. Pt verifies dosing regimen. Pt denies s/s bleeding/bruising/swelling/SOB. No changes in Rx/OTC/Herbal medications. Pt is still having a couple servings of greens per week. Pt does not smoke and rarely drinks EtOH. Pt reports stopping on 5/27 for spinal injection then a colonoscopy, restarted on 6/8. INR 1.1 is below the therapeutic range of 2-3. Due to holding for a procedure. Recommend to take 6mg tonight, then continue dose of 3 mg on Mon/Wed/Fri and 4.5 mg on all other days. Patient has warfarin 3 mg tablets. Will continue to monitor and check INR in 2 weeks. Dosing reminder card given with phone number, appointment date and time.   Return to clinic: 6/27 at 100  Referring physician: Dr. Ponce Rob

## 2022-06-27 ENCOUNTER — ANTI-COAG VISIT (OUTPATIENT)
Dept: PHARMACY | Age: 67
End: 2022-06-27
Payer: COMMERCIAL

## 2022-06-27 DIAGNOSIS — I48.0 PAROXYSMAL ATRIAL FIBRILLATION (HCC): Primary | ICD-10-CM

## 2022-06-27 LAB — INTERNATIONAL NORMALIZATION RATIO, POC: 3.1

## 2022-06-27 PROCEDURE — 99211 OFF/OP EST MAY X REQ PHY/QHP: CPT | Performed by: INTERNAL MEDICINE

## 2022-06-27 PROCEDURE — 85610 PROTHROMBIN TIME: CPT | Performed by: INTERNAL MEDICINE

## 2022-06-27 NOTE — PROGRESS NOTES
Ms. Jillian Edmonds is here for management of anticoagulation for Afib. PMH also significant for GERD, anxiety, depression, cardiomyopathy, hypothyroid, chronic back pain, hx of breast cancer, pacemaker placement, back surgery, and osteopenia. Pt verifies dosing regimen. Pt denies s/s bleeding/bruising/swelling/SOB. No changes in Rx/OTC/Herbal medications. Pt is still having a couple servings of greens per week. Pt does not smoke and rarely drinks EtOH. Maybe slightly less greens this past week, will try to increase some. INR 3.1 is within the therapeutic range of 2-3. Recommend to continue dose of 3 mg on Mon/Wed/Fri and 4.5 mg on all other days. Patient has warfarin 3 mg tablets. Will continue to monitor and check INR in 2 weeks. Dosing reminder card given with phone number, appointment date and time.   Return to clinic: 7/18 at 100  Referring physician: Dr. Cristobal Davidson

## 2022-07-13 NOTE — PROGRESS NOTES
Ms. Agnieszka Baumann is here for management of anticoagulation for Afib. PMH also significant for GERD, anxiety, depression, cardiomyopathy, hypothyroid, chronic back pain, hx of breast cancer, pacemaker placement, back surgery, and osteopenia. Pt verifies dosing regimen. Pt denies s/s bleeding/bruising/swelling/SOB. No BRBPR. No melena. No changes in OTC/Herbal medications. Started on Rogaine and a collagen powder. Pt does not smoke and rarely drinks EtOH. She states she needs an ablation. INR 2.5 is within the therapeutic range of 2-3. Recommend to continue 4.5mg daily, except 6 mg on Mondays. Patient has Warfarin 3mg tablets. Will continue to monitor and check INR in 4 weeks. Dosing reminder card given with phone number, appointment date and time.   Return to clinic: 10/26 @ 1pm  Referring physician: Dr. Alice Mancini .

## 2022-07-18 ENCOUNTER — ANTI-COAG VISIT (OUTPATIENT)
Dept: PHARMACY | Age: 67
End: 2022-07-18
Payer: MEDICARE

## 2022-07-18 DIAGNOSIS — I48.0 PAROXYSMAL ATRIAL FIBRILLATION (HCC): Primary | ICD-10-CM

## 2022-07-18 LAB — INTERNATIONAL NORMALIZATION RATIO, POC: 4.3

## 2022-07-18 PROCEDURE — 85610 PROTHROMBIN TIME: CPT | Performed by: INTERNAL MEDICINE

## 2022-07-18 PROCEDURE — 99211 OFF/OP EST MAY X REQ PHY/QHP: CPT | Performed by: INTERNAL MEDICINE

## 2022-07-18 NOTE — PROGRESS NOTES
MsMerrick Helton is here for management of anticoagulation for Afib. PMH also significant for GERD, anxiety, depression, cardiomyopathy, hypothyroid, chronic back pain, hx of breast cancer, pacemaker placement, back surgery, and osteopenia. Pt verifies dosing regimen. Pt denies s/s bleeding/bruising/swelling/SOB. No changes in Rx/OTC/Herbal medications. Pt is still having a couple servings of greens per week. Pt does not smoke and rarely drinks EtOH. Pt is under a lot of stress and chronic pain, I think this is why her INR continues to climb. She is not eating as much as normal.    INR 4.3 is within the therapeutic range of 2-3. Recommend to hold today, then reduce to 3mg daily, except 4.5mg Sun/Thu.  Patient has warfarin 3 mg tablets. Will continue to monitor and check INR in 2 weeks. Dosing reminder card given with phone number, appointment date and time.   Return to clinic: 8/1 at 100  Referring physician: Dr. Trino Toney

## 2022-08-08 ENCOUNTER — ANTI-COAG VISIT (OUTPATIENT)
Dept: PHARMACY | Age: 67
End: 2022-08-08
Payer: COMMERCIAL

## 2022-08-08 DIAGNOSIS — I48.0 PAROXYSMAL ATRIAL FIBRILLATION (HCC): Primary | ICD-10-CM

## 2022-08-08 LAB — INTERNATIONAL NORMALIZATION RATIO, POC: 1.3

## 2022-08-08 PROCEDURE — 99211 OFF/OP EST MAY X REQ PHY/QHP: CPT | Performed by: INTERNAL MEDICINE

## 2022-08-08 PROCEDURE — 85610 PROTHROMBIN TIME: CPT | Performed by: INTERNAL MEDICINE

## 2022-08-08 NOTE — PROGRESS NOTES
Ms. Elena Alvarado is here for management of anticoagulation for Afib. PMH also significant for GERD, anxiety, depression, cardiomyopathy, hypothyroid, chronic back pain, hx of breast cancer, pacemaker placement, back surgery, and osteopenia. Pt verifies dosing regimen. Pt denies s/s bleeding/bruising/swelling/SOB. No changes in Rx/OTC/Herbal medications. Pt is still having a couple servings of greens per week. Pt does not smoke and rarely drinks EtOH. Pt is under a lot of stress and chronic pain. She is not eating as much as normal.    She had a cortisone shot last week, restarted last Thursday 8/4. She has not been feeling well, not eating much. INR 1.3 is below the therapeutic range of 2-3. Recommend to take 3mg daily, except 4.5mg MWF. Patient has warfarin 3 mg tablets. Will continue to monitor and check INR in 1 week. Dosing reminder card given with phone number, appointment date and time.   Return to clinic: 8/15 at 200  Referring physician: Dr. Yandy Hou

## 2022-08-15 ENCOUNTER — ANTI-COAG VISIT (OUTPATIENT)
Dept: PHARMACY | Age: 67
End: 2022-08-15
Payer: COMMERCIAL

## 2022-08-15 DIAGNOSIS — I48.0 PAROXYSMAL ATRIAL FIBRILLATION (HCC): Primary | ICD-10-CM

## 2022-08-15 LAB — INTERNATIONAL NORMALIZATION RATIO, POC: 2.7

## 2022-08-15 PROCEDURE — 99211 OFF/OP EST MAY X REQ PHY/QHP: CPT | Performed by: INTERNAL MEDICINE

## 2022-08-15 PROCEDURE — 85610 PROTHROMBIN TIME: CPT | Performed by: INTERNAL MEDICINE

## 2022-08-15 NOTE — PROGRESS NOTES
Ms. Jeevan Cheek is here for management of anticoagulation for Afib. PMH also significant for GERD, anxiety, depression, cardiomyopathy, hypothyroid, chronic back pain, hx of breast cancer, pacemaker placement, back surgery, and osteopenia. Pt verifies dosing regimen. Pt denies s/s bleeding/bruising/swelling/SOB. No changes in Rx/OTC/Herbal medications. Pt is still having a couple servings of greens per week. Pt does not smoke and rarely drinks EtOH. Pt is under a lot of stress and chronic pain. She is not eating as much as normal.    INR 2.7 is within the therapeutic range of 2-3. Recommend to continue 3mg daily, except 4.5mg MWF. Patient has warfarin 3 mg tablets. Will continue to monitor and check INR in 4 weeks. Dosing reminder card given with phone number, appointment date and time.   Return to clinic: 9/12 at 100  Referring physician: Dr. Debra Jaimes

## 2022-09-12 ENCOUNTER — ANTI-COAG VISIT (OUTPATIENT)
Dept: PHARMACY | Age: 67
End: 2022-09-12
Payer: COMMERCIAL

## 2022-09-12 DIAGNOSIS — I48.0 PAROXYSMAL ATRIAL FIBRILLATION (HCC): Primary | ICD-10-CM

## 2022-09-12 LAB — INTERNATIONAL NORMALIZATION RATIO, POC: 3.3

## 2022-09-12 PROCEDURE — 85610 PROTHROMBIN TIME: CPT | Performed by: INTERNAL MEDICINE

## 2022-09-12 PROCEDURE — 99211 OFF/OP EST MAY X REQ PHY/QHP: CPT | Performed by: INTERNAL MEDICINE

## 2022-09-12 NOTE — PROGRESS NOTES
Ms. Merle Botello is here for management of anticoagulation for Afib. PMH also significant for GERD, anxiety, depression, cardiomyopathy, hypothyroid, chronic back pain, hx of breast cancer, pacemaker placement, back surgery, and osteopenia. Pt verifies dosing regimen. Pt denies s/s bleeding/bruising/swelling/SOB. No changes in OTC/Herbal medications. Started oxycodone for back pain. Pt is still having a couple servings of greens per week. Pt does not smoke and rarely drinks EtOH. Pt is under a lot of stress and chronic pain. She is not eating as much as normal.    INR 3.3 is above the therapeutic range of 2-3. Recommend to take 1.5mg tonight only, then decrease dose to 3mg daily, except 4.5mg Mon/Fri. Patient has warfarin 3 mg tablets. Will continue to monitor and check INR in 4 weeks. Dosing reminder card given with phone number, appointment date and time.   Return to clinic: 10/10 at 100  Referring physician: Dr. Minal Quinones

## 2022-09-27 NOTE — PROGRESS NOTES
Ms. Julia Stephens is here for management of anticoagulation for Afib. PMH also significant for GERD, anxiety, depression, cardiomyopathy, hypothyroid, chronic back pain, hx of breast cancer, pacemaker placement, back surgery, and osteopenia. Pt verifies dosing regimen. Pt denies s/s bleeding/bruising/swelling/SOB. No BRBPR. No melena. No changes in OTC/Herbal medications. Started on a new medication for depression/anxiety, but she cannot remember the name. Started on Rogaine and a collagen powder. Pt does not smoke and rarely drinks EtOH. She states she needs an ablation- has appt on 10/29. INR 2.6 is within the therapeutic range of 2-3. Recommend to continue 4.5mg daily, except 6 mg on Mondays. Patient has Warfarin 3mg tablets. Will continue to monitor and check INR in 4 weeks. Dosing reminder card given with phone number, appointment date and time.   Return to clinic: 11/30 @ 1pm  Referring physician: Dr. Jennifer Hong Bactrim Counseling:  I discussed with the patient the risks of sulfa antibiotics including but not limited to GI upset, allergic reaction, drug rash, diarrhea, dizziness, photosensitivity, and yeast infections.  Rarely, more serious reactions can occur including but not limited to aplastic anemia, agranulocytosis, methemoglobinemia, blood dyscrasias, liver or kidney failure, lung infiltrates or desquamative/blistering drug rashes.

## 2022-10-10 ENCOUNTER — ANTI-COAG VISIT (OUTPATIENT)
Dept: PHARMACY | Age: 67
End: 2022-10-10
Payer: COMMERCIAL

## 2022-10-10 DIAGNOSIS — I48.0 PAROXYSMAL ATRIAL FIBRILLATION (HCC): Primary | ICD-10-CM

## 2022-10-10 LAB — INTERNATIONAL NORMALIZATION RATIO, POC: 1.9

## 2022-10-10 PROCEDURE — 85610 PROTHROMBIN TIME: CPT | Performed by: INTERNAL MEDICINE

## 2022-10-10 PROCEDURE — 99211 OFF/OP EST MAY X REQ PHY/QHP: CPT | Performed by: INTERNAL MEDICINE

## 2022-10-10 NOTE — PROGRESS NOTES
Ms. Lulu Edwards is here for management of anticoagulation for Afib. PMH also significant for GERD, anxiety, depression, cardiomyopathy, hypothyroid, chronic back pain, hx of breast cancer, pacemaker placement, back surgery, and osteopenia. Pt verifies dosing regimen. Pt denies s/s bleeding/bruising/swelling/SOB. No changes in OTC/Herbal medications. Started oxycodone for back pain. Pt is still having a couple servings of greens per week. Pt does not smoke and rarely drinks EtOH. Pt is under a lot of stress and chronic pain. Her stress level has been even higher over the past week or so, I think this is impacting her number. I hesitate to increase her dose back to what it was when she had the INR of 3.3, we will just bring her back in 2 weeks. Pt is having swelling in her left arm, she has had this before, getting ultrasound done today. INR 1.9 is within the therapeutic range of 2-3. Recommend to continue 3mg daily, except 4.5mg Mon/Fri. Patient has warfarin 3 mg tablets. Will continue to monitor and check INR in 2-3 weeks. Dosing reminder card given with phone number, appointment date and time.   Return to clinic: 10/31 at 100  Referring physician: Dr. Ihsan Nash

## 2022-10-31 ENCOUNTER — ANTI-COAG VISIT (OUTPATIENT)
Dept: PHARMACY | Age: 67
End: 2022-10-31
Payer: COMMERCIAL

## 2022-10-31 DIAGNOSIS — I48.0 PAROXYSMAL ATRIAL FIBRILLATION (HCC): Primary | ICD-10-CM

## 2022-10-31 LAB — INTERNATIONAL NORMALIZATION RATIO, POC: 2

## 2022-10-31 PROCEDURE — 85610 PROTHROMBIN TIME: CPT | Performed by: INTERNAL MEDICINE

## 2022-10-31 PROCEDURE — 99211 OFF/OP EST MAY X REQ PHY/QHP: CPT | Performed by: INTERNAL MEDICINE

## 2022-10-31 NOTE — PROGRESS NOTES
Ms. Timbo Epperson is here for management of anticoagulation for Afib. PMH also significant for GERD, anxiety, depression, cardiomyopathy, hypothyroid, chronic back pain, hx of breast cancer, pacemaker placement, back surgery, and osteopenia. Pt verifies dosing regimen. Pt denies s/s bleeding/bruising/swelling/SOB. No changes in OTC/Herbal medications. She is on oxycodone for back pain. Pt is still having a couple servings of greens per week. Pt does not smoke and rarely drinks EtOH. Pt is still under a lot of stress,  has been in hospital.   Getting shoulder surgery in Jan.     INR 2.0 is within the therapeutic range of 2-3. Recommend to continue 3mg daily, except 4.5mg Mon/Fri. Patient has warfarin 3 mg tablets. Will continue to monitor and check INR in 3 weeks. Dosing reminder card given with phone number, appointment date and time.   Return to clinic: 11/21 at 100  Referring physician: Dr. Dakota Clarke

## 2022-11-15 ENCOUNTER — HOSPITAL ENCOUNTER (OUTPATIENT)
Dept: PHYSICAL THERAPY | Age: 67
Setting detail: THERAPIES SERIES
Discharge: HOME | End: 2022-11-15
Payer: MEDICARE

## 2022-11-15 PROCEDURE — 97161 PT EVAL LOW COMPLEX 20 MIN: CPT

## 2022-11-15 PROCEDURE — 97110 THERAPEUTIC EXERCISES: CPT

## 2022-11-15 NOTE — PROGRESS NOTES
[facility]  [address] [place] [address]  JOSELITO Brady 87  Phone: [phone]      Fax:   [phone]        Dear Dr. Liliane Hoyos MD,    We had the pleasure of evaluating the following patient for physical therapy services at [address] [place] [address]. A summary of our findings can be found in the initial assessment below. This includes our plan of care. If you have any questions or concerns regarding these findings, please do not hesitate to contact me at the office phone number above. Thank you for this referral.       Physician/Provider Signature:_______________________________Date:__________________  By signing above (or electronic signature), therapist's plan is approved by physician      Patient: Alissa Elizabeth     : 1955     MRN: [id number]             Evaluation Date: 11/15/2022        Medical Diagnosis Information:  Diagnosis: Personal history of breast cancer Z85.3, Lymphedema of upper limb I89.0, Swelling of left arm R 22.32   Treatment Diagnosis: edema of left arm                                           Insurance information: PT Insurance Information: Medicare     Precautions/ Contra-indications: few years ago fracture of left UE; lumbar fusion in ; CHF; Afib; Cardiomyopathy; thoracic outlet surgery in  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 Latex Allergy:  [x]NO      []YES      Preferred Language for Healthcare:   [x]English       []other:  C-SSRS Triggered by Intake questionnaire (Past 2 wk assessment):   [] No, Questionnaire did not trigger screening.    [x] Yes, Patient intake triggered further evaluation      [x] C-SSRS Screening completed, no further assessment required  [] PCP notified via Plan of Care  [] Emergency services notified    SUBJECTIVE FINDINGS        History of Present Illness:      Pt presents with C/o edema over las 2-3 months in left UE. Pt reports that she is having RC repair of right shoulder  in Jan 2023 and will be going to rehab. Pt had mastectomy in 1991 and pt report that she had 3 rounds of chemo and radiation. Hysterectomy in 1984  Pain       Patient describes pain to be numbness from shoulder to fingers. Patient reports pain is  7-8/10 pain at present and  9-10/10 pain at its worst.  Worsened by all activity   Improved by nothing stated      Current Functional Limitations:   [x]   Yes   []   No  Functional Complaints:  all activity    PLOF:   []   No functional limitations   [x]   Pt with previous functional limitations  Comment:left UE limited in all motion  Pt's sleep is affected? 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       Functional Scale/Score:    Shoulder pain and disability index   Date     11/15/2022   Intake Score:    123/130=95%     Occupation/School: Retired     Sport/recreational activities: did not indicate     Patient goal for therapy:  \"control swelling in left arm\"    OBJECTIVE FINDINGS:  Vitals: Blood pressure______  O2 sat______  Pulse______ not assessed    Pitting  None  Color  Other bruising  Skin Texture  Normal  Skin Temperature  Normal  Edema Rebound*:  Quick  *pressure applied x10 seconds    Signs of Constriction:  Condition of Nailbeds not formally assessed     Skin Breakdown (indicate size & number-also note location on body drawings): pt had bandage on left wrist region  Tinea (fungus): none  Papilloma-- benign tumor arising from an epithelial layer: none  Fibrotic areas: none  Warts-- a local growth of the outer layer of skin: none  Ulcers: none  SCARS: none  STEMMER SIGN; (positive/negative) not tested    Stage of Lymphedema (Golematis)  Mild:  (less than 2 cm difference  Moderate (2-4 cm difference  Moderate/Severe ( 4-5cm difference  Severe (greater than 5 cm difference)    Definitions:  Papilloma=benign tumor arising from an epithelial layer  Wart=a local growth of the outer layer of skin  Brawny=does not indent    UE Range of motion_left UE limited through out secondary to previous surgery. Abduction 5 and flexion 3. Pt able to perform elbow flexion and extension, wrist flexion and finger flexion and extension with arm supported on pillows. []  WNL  [] WFL      LE Range of motion_____________________________________________________    []  WNL  [x] [facility]     GIRTH MEASUREMENT FLOW SHEET    Upper/ Extremity R L   Date 11/15 11/15          6 Inches above bend of elbow                           27.8 27.2         3  Inches above bend of elbow 25 28.5           Bend of elbow 22.1 23           3 Inches below bend of elbow 21.9 21.7          6 Inches below bend of elbow 17.6 15.9   Wrist-Styloids                                   14 14   Distal Palmar Crease 17.7 14.4   Thumbs Proximal Phalanx 5.4 5   Base of 3rd digit 5 4.8        TOTAL GIRTH                 156.5                    154.5   Right is greater than left due to atrophy of left hand compared to right. Noted increase in edema at elbow and 3 inches above elbow on left compared to right. ASSESSMENT : Pt demonstrates limited function to left UE overall. Pt demonstrates increased edema in left elbow and proximal to on left compared to right. Problems          []   Decreased cervical ROM  [x]   Decreased UE ROM  [x]   Increased pain  []   Decreased flexibility  []   Abnormality of gait  [x]   Increased swelling  []   Poor posture/alignment  [x]   Decreased functional status     Rehabilitation Potential:  Good for goals listed below. Strengths for achieving goals include:  [x]   Pt motivated   []   PLOF   []   Family support   Limitations for achieving goals include:  []   None   [x]   Severity of condition     []   Cognitive   []   Lack of family support   []   Lack of resources  []   Co-morbidities     []   Other:         Prognosis:   [x]   Good   [x]   Fair   []   Poor    GOALS    Short Term Goals:   2  weeks Long Term Goals:   4  weeks   1). Establish HEP 1). Pt independent with HEP   2). Pain  5/10 or less 2). Pain  3/10 or less   3). Decrease overall girth by 1 cm in left UE 3 inches above elbow 3). Decrease overall girth by 2 cm in left UE 3 inches above elbow   4). Fit for velcro garment if appropriate 4). Pt have education and HEP to help control edema in left UE   5). 5). Pt able to don and doff velcro compression garment if appropriate. 6). 6). PLAN OF CARE     To see patient  2 x/week for  4 weeks for the following treatment interventions:    [x]   Therapeutic Exercise  [x]   Modalities of Choice:   []   Vasopneumatic pump    [x] Lymphatouch     [x]   Manual Therapy  []   Neuromuscular Re-Education  []   Gait Training   []   Therapeutic Activity  [x]   Lymphedema precautions and prevention  [x]   Use of compression garment if appropriate  []   Other     Treatment Performed this visit :   0 minutes   Education on lymphatics and exercise  Exercise: finger flexion and extension, wrist flexion and extension, elbow flexion and extension 1x10 with arm elevated on pillow as able secondary to restriction from limited motion at shoulder joint. Pt response to Tx:  Pt demonstrated appropriate technique with exercise and verbalized understanding of education. Plan for Next Visit:   Retro massage, exercise and give information on velcro garments.     Timed Code Treatment Minutes:   15  minutes   Total Treatment Time:   40 minutes    Plan of care sent to provider:      [x]Faxed  []Co-signature    (attempts: 1[x] 2 []3[]) Medicare Cap total YTD:    []N/A  Workers Comp Time Stamp  [x]N/A   Time In:   Time Out:    Thank you for the referral of this patient.       Mónica Snow PT MPT  License #[id number]

## 2022-11-15 NOTE — FLOWSHEET NOTE
Physical Therapy Daily Treatment Note    [x]Daily Tx Note    []Progress Note    [] Discharge Summary         Date:  11/15/2022    Patient Name:  Tonya Coleman    :  1955  MRN: [id number]      Medical/Treatment Diagnosis Information:  Diagnosis: Personal history of breast cancer Z85.3, Lymphedema of upper limb I89.0, Swelling of left arm R 22.32  Treatment Diagnosis: edema of left arm    Insurance/Certification information:  PT Insurance Information: Medicare    Physician Information:  Referring Provider (secondary): Vidal Zamudio MD      Plan of care sent to provider:      [x]Faxed   []Co-signature    (attempts: 1[x] 11/15/22 2 []3[])     Plan of care signed :  []  Yes  [x] No      Date of Patient follow up with Physician:     Is this a Progress Report:     []  Yes  [x]  No      If Yes:  Date Range for reporting period:  Beginning 11/15/2022  Ending    Progress report will be due (10 Rx or 30 days whichever is less):       Recertification will be due (POC Duration  / 90 days whichever is less):       Visit # Insurance Allowable Auth Required      []  Yes [x]  No        Functional Scale:   Shoulder pain and disability index     Date    11/15/2022  Score    123/130=95%    Latex Allergy:  [x]NO      []YES  Preferred Language for Healthcare:   [x]English       []other:    RESTRICTIONS/PRECAUTIONS:      SUBJECTIVE:  See eval    Pain level:  7-8/10    Plan Moving Forward/ For next visit:   Retro massage, exercise and give information on velcro garments. OBJECTIVE: See eval    Exercises/Interventions:     Exercises in bold performed in department today. Items not bolded are carried forward from prior visits for continuity of the record.   Exercise/Equipment Resistance/Repetitions HEP Other comments     finger flexion and extension, wrist flexion and extension, elbow flexion and extension 1x10 with arm elevated on pillow as able secondary to restriction from limited motion at shoulder joint. 1x10 []        []        []        []        []        []        []        []        []          []         []        []        []        []        []        []        []        []      Therapeutic Exercise/Home Exercise Program:   15 minutes  See above  Education on lymphatics and exercise    Therapeutic Activity:  0 minutes     Gait: 0 minutes    Neuromuscular Re-Education:  0 minutes      Canalith Repositioning Procedure:      Manual Therapy:  0 minutes    Modalities: 0 minutes    ASSESSMENT:  Pt demonstrates limited function to left UE overall. Pt demonstrates increased edema in left elbow and proximal to on left compared to right. Goals:   GOALS    Short Term Goals:   2  weeks Long Term Goals:   4  weeks   1). Establish HEP 1). Pt independent with HEP   2). Pain  5/10 or less 2). Pain  3/10 or less   3). Decrease overall girth by 1 cm in left UE 3 inches above elbow 3). Decrease overall girth by 2 cm in left UE 3 inches above elbow   4). Fit for velcro garment if appropriate 4). Pt have education and HEP to help control edema in left UE   5). 5). Pt able to don and doff velcro compression garment if appropriate. 6). 6). Overall Progression Towards Functional goals/ Treatment Progress Update:  [] Patient is progressing as expected towards functional goals listed. [] Progression is slowed due to complexities/Impairments listed. [] Progression has been slowed due to co-morbidities.   [x] Plan just implemented, too soon to assess goals progression <30days   [] Goals require adjustment due to lack of progress  [] Patient is not progressing as expected and requires additional follow up with physician  [] Other    Prognosis for POC: [x] Good [] Fair  [] Poor    Patient requires continued skilled intervention: [x] Yes  [] No    Treatment/Activity Tolerance:  [x] Patient able to complete treatment  [] Patient limited by fatigue  [] Patient limited by pain    [] Patient limited by other medical complications  [] Other:       PLAN: See eval  [] Continue per plan of care [] Alter current plan (see comments above)  [x] Plan of care initiated [] Hold pending MD visit [] Discharge    Therapeutic Exercise and NMR EXR  [x] (07846) Provided verbal/tactile cueing for activities related to strengthening, flexibility, endurance, ROM  for improvements in proximal strength and core control with self care, mobility, lifting and ambulation.  [] (85431) Provided verbal/tactile cueing for activities related to improving balance, coordination, kinesthetic sense, posture, motor skill, proprioception  to assist with core control in self care, mobility, lifting, and ambulation.      Therapeutic Activities and Gait:    [] (14421 or 51404) Provided verbal/tactile cueing for activities related to improving balance, coordination, kinesthetic sense, posture, motor skill, proprioception and motor activation to allow for proper function  with self care and ADLs  [] (46411) Provided training and instruction to the patient for proper core and proximal hip recruitment and positioning with ambulation re-education     Home Exercise Program:    [x] (02143) Reviewed/Progressed HEP activities related to strengthening, flexibility, endurance, ROM of core, proximal hip and LE for functional self-care, mobility, lifting and ambulation   [] (98564) Reviewed/Progressed HEP activities related to improving balance, coordination, kinesthetic sense, posture, motor skill, proprioception of core, proximal hip and LE for self care, mobility, lifting, and ambulation      Manual Treatments:  PROM / STM / Oscillations-Mobs:  G-I, II, III, IV (PA's, Inf., Post.)  [] (13830) Provided manual therapy to mobilize proximal hip and LS spine soft tissue/joints for the purpose of modulating pain, promoting relaxation,  increasing ROM, reducing/eliminating soft tissue swelling/inflammation/restriction, improving soft tissue extensibility and allowing for proper ROM for normal function with self care, mobility, lifting and ambulation. []CRP:  Canalith Repositioning procedure for the assessment, treatment and education of BPPV    Modalities:       Charges:  Timed Code Treatment Minutes: 15   Total Treatment Minutes: 40     Medicare Cap total YTD:        [x]N/A  Workers Comp Time Stamp  (Per CPT and Total Treatment) [x]N/A   Time In:   Time Out:       [x] EVAL    [] Dry Needling  [x] LB(22323)   x 1    [] EStim Unattended 76565  [] NMR (75907)  x     [] Estim Attended  08563  [] Manual (27172)  x      [] Mechanical Txn 63872  [] TA    x     [] Ultrasound  [] Gait   x  [] Vaso  [] CRP    [] Ionto           [] Other:        Electronically signed by: HERNANDEZ Trevino,[id number]      Note: If patient does not return for scheduled/ recommended follow up visits, this note will serve as a discharge from care along with most recent update on progress.

## 2022-11-22 ENCOUNTER — TELEPHONE (OUTPATIENT)
Dept: ORTHOPEDIC SURGERY | Age: 67
End: 2022-11-22

## 2022-11-22 ENCOUNTER — HOSPITAL ENCOUNTER (OUTPATIENT)
Dept: PHYSICAL THERAPY | Age: 67
Setting detail: THERAPIES SERIES
Discharge: HOME | End: 2022-11-22
Payer: MEDICARE

## 2022-11-22 NOTE — FLOWSHEET NOTE
Physical Therapy  Cancellation/No-show Note  Patient Name:  Elena Holbrook  :  1955   Date:  2022  Cancels to Date: 1  No-shows to Date: 0    For today's appointment patient:  [x]  Cancelled  []  Rescheduled appointment  []  No-show     Reason given by patient:  []  Patient ill  []  Conflicting appointment  []  No transportation    []  Conflict with work  []  No reason given  [x]  Other: Pt fell and broke arm in two places. Comments:      Electronically signed by:   Rick Pollard, [id number]

## 2022-11-22 NOTE — PROGRESS NOTES
Dr. Twyla Morales,   Pt was seen for initial evaluation only. Pt called today and stated that she fell and broke her left wrist and elbow and will call when she can return to PT. Outpatient Physical Therapy Discharge Note         Date:  11/22/2022    Patient Name:  Erika Morales         YOB: 1955    Medical Diagnosis:  Diagnosis: Personal history of breast cancer Z85.3, Lymphedema of upper limb I89.0, Swelling of left arm R 22.32  Treatment Diagnosis: edema of left arm     Insurance/Certification information:  PT Insurance Information: Medicare     Physician Information:  Referring Provider (secondary): Ismael Ku MD        Plan of care sent to provider:      [x]Faxed   []Co-signature    (attempts: 1[x] 11/15/22 2 []3[])     Plan of care signed :  []  Yes  [x] No      Latex Allergy:  [x]NO      []YES  Preferred Language for Healthcare:   [x]English       []other:     RESTRICTIONS/PRECAUTIONS:       Progress Note covers period from (if applicable):    [x]From 11/15/2022         To      11/22/2022     Visit# / total visits: 1         Subjective:  Pt was seen for initial evaluation. Pt called today and stated that she fell and broke her left wrist and elbow and will call when she can return to PT. Objective:  No change secondary to pt unable to return and only seen for initial evaluation. []Unable to reassess for discharge note due to pt   [] Cancelled   [] Did not show  [] Requests discharge   [x]See last daily/progress note for most recent treatment/assessment       Assessment:  : Pt demonstrates limited function to left UE overall. Pt demonstrates increased edema in left elbow and proximal to on left compared to right. from initial evaluation. Goals:   GOALS    Short Term Goals:   2  weeks Long Term Goals:   4  weeks   1). Establish HEP 1). Pt independent with HEP   2). Pain  5/10 or less 2). Pain  3/10 or less   3).  Decrease overall girth by 1 cm in left UE 3 inches above elbow 3). Decrease overall girth by 2 cm in left UE 3 inches above elbow   4). Fit for velcro garment if appropriate 4). Pt have education and HEP to help control edema in left UE   5). 5). Pt able to don and doff velcro compression garment if appropriate. 6). 6). Progress towards goals:   [] All Goals Met   [] Goals Partially Met   [x]  Goals not met due to pt had to cancel upcoming appointments. Plan:    [x]Discharge from PT at this time      Thank you for your referral of this patient. Electronically signed by:   Brandon Vance, PT, [id number]

## 2022-11-22 NOTE — TELEPHONE ENCOUNTER
Appointment Request     Patient requesting earlier appointment: Yes  Appointment offered to patient: 11/25  PT WENT TO HER PCP TWO DAYS AGO AND HAD XR. SHE HAS AN ELBOW AND WRIST FX. SHE IS NOT WRAPPED AND IS IN PAIN. SHE DOES NOT WANT TO GO TO ER TO BE SPLINTED, BUT DOES NOT WANT TO WAIT TO SEE A DR Matt Maza Friday. SHE IS ASKING TO BE SEEN SOONER.   Patient Contact Number: [phone]

## 2022-11-28 ENCOUNTER — ANTI-COAG VISIT (OUTPATIENT)
Dept: PHARMACY | Age: 67
End: 2022-11-28
Payer: COMMERCIAL

## 2022-11-28 DIAGNOSIS — I48.0 PAROXYSMAL ATRIAL FIBRILLATION (HCC): Primary | ICD-10-CM

## 2022-11-28 LAB — INTERNATIONAL NORMALIZATION RATIO, POC: 2.6

## 2022-11-28 PROCEDURE — 99211 OFF/OP EST MAY X REQ PHY/QHP: CPT | Performed by: INTERNAL MEDICINE

## 2022-11-28 PROCEDURE — 85610 PROTHROMBIN TIME: CPT | Performed by: INTERNAL MEDICINE

## 2022-11-28 NOTE — PROGRESS NOTES
Ms. Darío Varela is here for management of anticoagulation for Afib. PMH also significant for GERD, anxiety, depression, cardiomyopathy, hypothyroid, chronic back pain, hx of breast cancer, pacemaker placement, back surgery, and osteopenia. Pt verifies dosing regimen. Pt denies s/s bleeding/bruising/swelling/SOB. No changes in Rx/OTC/Herbal medications. Using Vegamour hair serum for [place] loss. She is on oxycodone for back pain. Pt is still having a couple servings of greens per week. Pt does not smoke and rarely drinks EtOH. Pt is still under a lot of stress, plus she just broke her arm. Getting shoulder surgery on Jan 19th. INR 2.6 is within the therapeutic range of 2-3. Recommend to continue 3mg daily, except 4.5mg Mon/Fri. Patient has warfarin 3 mg tablets. Will continue to monitor and check INR in 3 weeks. Dosing reminder card given with phone number, appointment date and time.   Return to clinic: 12/19 at 100  Referring physician: Dr. Guera Beyer

## 2022-11-29 ENCOUNTER — APPOINTMENT (OUTPATIENT)
Dept: PHYSICAL THERAPY | Age: 67
End: 2022-11-29
Payer: MEDICARE

## 2022-12-01 ENCOUNTER — APPOINTMENT (OUTPATIENT)
Dept: PHYSICAL THERAPY | Age: 67
End: 2022-12-01
Payer: COMMERCIAL

## 2022-12-06 ENCOUNTER — APPOINTMENT (OUTPATIENT)
Dept: PHYSICAL THERAPY | Age: 67
End: 2022-12-06
Payer: COMMERCIAL

## 2022-12-08 ENCOUNTER — APPOINTMENT (OUTPATIENT)
Dept: PHYSICAL THERAPY | Age: 67
End: 2022-12-08
Payer: COMMERCIAL

## 2022-12-13 ENCOUNTER — APPOINTMENT (OUTPATIENT)
Dept: PHYSICAL THERAPY | Age: 67
End: 2022-12-13
Payer: COMMERCIAL

## 2022-12-15 ENCOUNTER — APPOINTMENT (OUTPATIENT)
Dept: PHYSICAL THERAPY | Age: 67
End: 2022-12-15
Payer: COMMERCIAL

## 2022-12-19 ENCOUNTER — ANTI-COAG VISIT (OUTPATIENT)
Dept: PHARMACY | Age: 67
End: 2022-12-19
Payer: COMMERCIAL

## 2022-12-19 DIAGNOSIS — I48.0 PAROXYSMAL ATRIAL FIBRILLATION (HCC): Primary | ICD-10-CM

## 2022-12-19 LAB — INTERNATIONAL NORMALIZATION RATIO, POC: 3.4

## 2022-12-19 PROCEDURE — 99211 OFF/OP EST MAY X REQ PHY/QHP: CPT | Performed by: INTERNAL MEDICINE

## 2022-12-19 PROCEDURE — 85610 PROTHROMBIN TIME: CPT | Performed by: INTERNAL MEDICINE

## 2023-01-03 ENCOUNTER — HOSPITAL ENCOUNTER (OUTPATIENT)
Dept: PHYSICAL THERAPY | Age: 68
Setting detail: THERAPIES SERIES
Discharge: HOME | End: 2023-01-03
Payer: COMMERCIAL

## 2023-01-03 ENCOUNTER — HOSPITAL ENCOUNTER (OUTPATIENT)
Dept: OCCUPATIONAL THERAPY | Age: 68
Setting detail: THERAPIES SERIES
Discharge: HOME | End: 2023-01-03
Payer: COMMERCIAL

## 2023-01-03 PROCEDURE — 97164 PT RE-EVAL EST PLAN CARE: CPT

## 2023-01-03 PROCEDURE — 97165 OT EVAL LOW COMPLEX 30 MIN: CPT

## 2023-01-03 PROCEDURE — 97140 MANUAL THERAPY 1/> REGIONS: CPT

## 2023-01-03 PROCEDURE — 97110 THERAPEUTIC EXERCISES: CPT

## 2023-01-03 NOTE — PROGRESS NOTES
The following patient has been evaluated for occupational therapy services. Please review the attached evaluation and/or summary of the patient's plan of care, and verify that you agree by signing below and sending it back to our office. Thank you for this referral.    Physician signature_______________________ Date________________    Fax to:   [facility] phone: [phone] Fax: [phone]      LYMPHEDEMA EVALUATION  Evaluation Date:  1/3/2023         Patient Name:  Nayeli Mckenna       YOB: 1955       Medical Diagnosis:  L radial neck fracture       ICD 10:  S52.135A    Treatment Diagnosis: Impaired L UE AROM and strength. Lymphedema        Onset Date: 11/21/22     Referral Date:  12/13/22     Referring Physician: Avril Sargent MD        Visits Allowed/Insurance/Certification Information: Medicare      Precautions/ Contra-indications:   Latex Allergy:  [x]NO      []YES      SUBJECTIVE FINDINGS      Preferred Language for Healthcare:   [x]English       []other:    Pt Occupation/Job Duties:  retired. 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 Surgeries: L breast mastectomy and reconstruction    Medications:    C-SSRS Triggered by Intake questionnaire (Past 2 wk assessment):   [] No, Questionnaire did not trigger screening. [x] Yes, Patient intake triggered further evaluation      [x] C-SSRS Screening completed, no further assessment required; pt agreeable to speaking with spiritual care. Will give number.   [] PCP notified via Plan of Care  [] Emergency services notified    History of Present Illness:      Triggering event and start date of swelling/lymphedema: Pt fell and went to PCP. Pt had to wait a week to get x-ray and another week for follow up with ortho. Pt has been in a sling since injury. Pt presents with C/o edema over las 2-3 months in left UE. Pt reports that she is having RC repair of right shoulder  in Jan 2023 and will be going to rehab. Pt had mastectomy in 1992, 3 rounds of chemo and radiation. Hysterectomy in 1984      Cellulitis: [] Yes [x] No [] Current  Notes:_________    Chemotherapy:  [x] Yes [] No [] Current  [x] Completed Date: 1992    Radiation:  [x] Yes [] No [] Current  [x] Completed Date: 1992     Reconstructive Surgery: [x] Yes- 1992    Axillary Node Dissection: [x] Yes       Previous treatment for swelling/lymphedema? [x] Yes [] No    [x] Manual Lymph Drainage (MLD)    [] Compression pump  [] Compression garments  [] Compression bandaging    [] Flexitouch  [x] Lymphedema exercise    [] low level laser  Notes:     Does Pt. Currently wear a compression sleeve or stocking:  [] Yes [x] No    Does Pt. Exercise regularly: [] Yes [x] No       Current Functional Limitations:   [x]   Yes   []   No  Functional Complaints:      PLOF:   []   No functional limitations   [x]   Pt with previous functional limitations    Difficulties with any of the following?:  [x] Walking (cane)  [x] Reaching feet and toes  [x]  Preparing meals  [x] Dressing  [x] Bathing/showering   [x] sleeping in bed  [x] Other: household tasks    Pt's sleep is affected? [x]   Yes   []   No      Patient goal for therapy:  \"better  and ROM.  \"    OBJECTIVE FINDINGS:    Pain       Patient describes pain to be 6/10 at rest. Numbness/tingling throughout arm    Pitting  None  Color  Other bruising  Skin Texture  Normal  Skin Temperature  Normal  Edema Rebound*:  Quick  *pressure applied x10 seconds     Signs of Constriction:  Condition of Nailbeds not formally assessed                Skin Breakdown (indicate size & number-also note location on body drawings): none  Tinea (fungus): none  Papilloma-- benign tumor arising from an epithelial layer: none  Fibrotic areas: none  Warts-- a local growth of the outer layer of skin: none  Ulcers: none  SCARS: none  STEMMER SIGN; (positive/negative) not tested     Stage of Lymphedema (Golematis)  Mild:  (less than 2 cm difference  Moderate (2-4 cm difference  Moderate/Severe ( 4-5cm difference  Severe (greater than 5 cm difference)     Definitions:  Papilloma=benign tumor arising from an epithelial layer  Wart=a local growth of the outer layer of skin  Brawny=does not indent     UE Range of motion_left UE limited through out secondary to previous surgery. Abduction 5 and flexion 3. Pt able to perform elbow flexion and extension, wrist flexion and finger flexion and extension with arm supported on pillows.    -axillary webbing/cording present       GIRTH MEASUREMENT FLOW SHEET     Upper/ Extremity R L R L   Date 11/15 11/15 1/3/2023 1/3/2023          6 Inches above bend of elbow                           27.8 27.2 28.3 29.2         3  Inches above bend of elbow 25 28.5 24.7 28.7           Bend of elbow 22.1 23 22.5 23.8           3 Inches below bend of elbow 21.9 21.7 21.8 21.4          6 Inches below bend of elbow 17.6 15.9 17.2 15.7   Wrist-Styloids                                   14 14 14.1 13.6   Distal Palmar Crease 17.7 14.4 16.1 14.6   Thumbs Proximal Phalanx 5.4 5 5.3 5   Base of 3rd digit 5 4.8 5 4.7               TOTAL GIRTH          156.5                    154.5         155       156.7   Left is greater than right. Noted increase in edema at elbow  and above on left compared to right. Overall edema increase when seen for PT 2.2 cm prior to fall.      Functional Outcome Measure:       Functional Scale/Score:       UEFI  Date                                         1/3/2023            Intake Score:                           21/80    ASSESSMENT       Impairments:  [x]   Unable to manage lymphedema  [x]   Difficulties with ADLs: reaching, grooming  []   Other:    Problems          []   Decreased cervical ROM  [x]   Decreased active shoulder ROM  [x]   Increased pain  [x]   Decreased flexibility  []   Increased risk for fall due to   []   Standing tolerance    [x]   Increased swelling  []   Poor posture  [x]   Limited or no knowledge of lymphedema treatment and/or precautions  [x]   Limited or no knowledge of skin care and infection prevention  [x]   Patient has no / inadequate compression garment to control lymphedema  [x]   Other- fine motor impairments (unable to complete clothing fasteners)     Rehabilitation Potential:  Good for goals listed below. Strengths for achieving goals include:  [x]   Pt motivated   [x]   PLOF   [x]   Family support   Limitations for achieving goals include:  []   None   []   Severity of condition     []   Cognitive   []   Lack of family support   []   Lack of resources  []   Co-morbidities     []   Other:         Prognosis:   []   Good   [x]   Fair   []   Poor    GOALS    Short-Term Goals at 3 weeks:   [x]  Patient and/or care giver will understand lymphedema precautions to decrease the risk of infection and acerbation of the lymphedema. []  Patient will develop a tolerance for wearing multi-layer, short-stretch bandages between treatment sessions to facilitate limb decongestion. []  Patient will experience a decrease in pitting edema which will improve tissue health and decrease the risk of infection. []  Body weight will be compared to changes in edema to monitor for appropriate elimination of fluid to reduce the risk of cardiac overload (in cases of severe LE edema). [x] Patient will perform HEP with minimal assistance to help improve lymphatic flow and venous return. [x] Patient will perform a self-MLD protocol with minimal assistance to help reduce swelling and thus improve ROM and mobility.      Long-Term Goals at 6 weeks:   [x] Pt will have a decrease in pain to 0/10 to facilitate improvement in ADLs   [x] Patient will experience increased range of motion in L UE to improve independence wit ADLs   [x] Patient will be independent with donning and doffing of compression garments which will enable regular daily garment wear. [x] Treatment will achieve maximum edema and/or lymphedema reduction to enable functional improvements such as fitting into standard-size clothing and shoes, a return to a prior level of function, improved balance, and reduced risk of falling. [x] Patient and/or care giver will be independent with HEP and lymphedema management to help prevent edema relapse and reduce risk of infection. [x] Pt to report a score of 30 or more on UEFI  for increased performance with daily activities. PLAN OF CARE     To see patient  2 x/week for  6 weeks for the following treatment interventions:    [x]   Therapeutic Exercise  [x]   Modalities of Choice:   []   Vasopneumatic pump    [x] Lymphatouch     [x]   Manual Therapy  [x]   Neuromuscular Re-Education  []   Gait Training   [x]   Therapeutic Activity  [x]   Lymphedema precautions and prevention  [x]   Use of compression garment  []   Other     Treatment Performed this visit :  45 minutes   Soft tissue mobilization  Exercise  Education  Donning tubigrip for compression  Pt response to Tx:  verbalized understanding of education    Plan for Next Visit:   MLD  Compression  Soft tissue mobilization   exercise        Timed Code Treatment Minutes:  45   minutes   Total Treatment Time:   60 minutes    Plan of care sent to provider:      [x]Faxed  []Co-signature    (attempts: 1[x] 2 []3[])         Medicare Cap total YTD:    [x]N/A  Workers Comp Time Stamp  [x]N/A   Time In:   Time Out:    Thank you for the referral of this patient.         CAROL SheltonR/MEIR #[id number]

## 2023-01-03 NOTE — PROGRESS NOTES
[facility] Outpatient Therapy  [address], [place] [address] [facility], [place]  Phone: [phone]      Fax:   [phone]        Dear Dr. Belen Hoyos MD,    We had the pleasure of evaluating the following patient for physical therapy services at [address] [place] [address]. A summary of our findings can be found in the re-evaluation assessment below. This includes our plan of care. If you have any questions or concerns regarding these findings, please do not hesitate to contact me at the office phone number above. Thank you for this referral.       Physician/Provider Signature:_______________________________Date:__________________  By signing above (or electronic signature), therapist's plan is approved by physician      Patient: Ismael Found     : 1955     MRN: [id number]          Evaluation Date: 1/3/2023        Medical Diagnosis Information:  Personal history of breast cancer, Z85.3; lymphedema upper limb I89.0  Edema of left UE                                           Insurance information:  PT Insurance Information: Medicare     Precautions/ Contra-indications: few years ago fracture of left UE; lumbar fusion in ; CHF; Afib; Cardiomyopathy; thoracic outlet surgery in  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 Latex Allergy:  [x]NO      []YES      Preferred Language for Healthcare:   [x]English       []other:  C-SSRS Triggered by Intake questionnaire (Past 2 wk assessment):   [] No, Questionnaire did not trigger screening. [x] Yes, Patient intake triggered further evaluation      [x] C-SSRS Screening completed, no further assessment required; pt agreeable to speaking with spiritual care will give number.   [] PCP notified via Plan of Care  [] Emergency services notified    SUBJECTIVE FINDINGS        History of Present Illness:    Pt fell 2022 and went to PCP. Pt reports had to wait a week to get x-ray. Pt then went to orthopedic one week from x-ray. Pt reports that she has been in a sling since injury. Pt presents with C/o edema since August 2022 in left UE. Pt reports that she is having RC repair of right shoulder  in Feb 2023 and will be going to rehab. Pt had mastectomy in 1991 and pt report that she had 3 rounds of chemo and radiation. Hysterectomy in 1984    Pain       Patient describes pain to be numbness from shoulder to fingers has been there. Patient reports pain is  4/10 pain at present and  9-10/10 pain at its worst.  Worsened by all activity   Improved by nothing stated      Current Functional Limitations:   [x]   Yes   []   No  Functional Complaints:  all activity    PLOF:   []   No functional limitations   [x]   Pt with previous functional limitations  Comment:left UE limited in all motion  Pt's sleep is affected? 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    fracture  Functional Scale/Score:  UEFI  Date     1/3/2023   Intake Score:    21/80    Occupation/School: Retired     Sport/recreational activities: did not indicate     Patient goal for therapy:  \"control swelling in left arm\"    OBJECTIVE FINDINGS:  Vitals: Blood pressure______  O2 sat______  Pulse______ not assessed    Pitting  None  Color  Other bruising  Skin Texture  Normal  Skin Temperature  Normal  Edema Rebound*:  Quick  *pressure applied x10 seconds    Signs of Constriction:  Condition of Nailbeds not formally assessed     Skin Breakdown (indicate size & number-also note location on body drawings):  Tinea (fungus): none  Papilloma-- benign tumor arising from an epithelial layer: none  Fibrotic areas: none  Warts-- a local growth of the outer layer of skin: none  Ulcers: none  SCARS: none  STEMMER SIGN; (positive/negative) not tested    Stage of Lymphedema (Golematis)  Mild:  (less than 2 cm difference  Moderate (2-4 cm difference  Moderate/Severe ( 4-5cm difference  Severe (greater than 5 cm difference)    Definitions:  Papilloma=benign tumor arising from an epithelial layer  Wart=a local growth of the outer layer of skin  Brawny=does not indent    UE Range of motion_left UE limited through out secondary to previous surgery. Abduction 5 and flexion 3. Pt able to perform elbow flexion and extension, wrist flexion and finger flexion and extension with arm supported on pillows. []  WNL  [] WFL      LE Range of motion_____________________________________________________    []  WNL  [x] [facility]     GIRTH MEASUREMENT FLOW SHEET    Upper/ Extremity R L R L   Date 11/15 11/15 1/3/2023 1/3/2023          6 Inches above bend of elbow                           27.8 27.2 28.3 29.2         3  Inches above bend of elbow 25 28.5 24.7 28.7           Bend of elbow 22.1 23 22.5 23.8           3 Inches below bend of elbow 21.9 21.7 21.8 21.4          6 Inches below bend of elbow 17.6 15.9 17.2 15.7   Wrist-Styloids                                   14 14 14.1 13.6   Distal Palmar Crease 17.7 14.4 16.1 14.6   Thumbs Proximal Phalanx 5.4 5 5.3 5   Base of 3rd digit 5 4.8 5 4.7          TOTAL GIRTH          156.5                    154.5         155       156.7   Left is greater than right. Noted increase in edema at elbow  and above on left compared to right. Overall edema increase when seen for PT 2.2 cm prior to fall. ASSESSMENT : Pt demonstrates limited function to left UE overall. Pt demonstrates increased edema in left elbow and above on left compared to right.      Problems          []   Decreased cervical ROM  [x] Decreased UE ROM  [x]   Increased pain  []   Decreased flexibility  []   Abnormality of gait  [x]   Increased swelling  []   Poor posture/alignment  [x]   Decreased functional status     Rehabilitation Potential:  Good for goals listed below. Strengths for achieving goals include:  [x]   Pt motivated   []   PLOF   []   Family support   Limitations for achieving goals include:  []   None   [x]   Severity of condition     []   Cognitive   []   Lack of family support   []   Lack of resources  []   Co-morbidities     []   Other:         Prognosis:   [x]   Good   [x]   Fair   []   Poor    GOALS    No goals established. Pt is being seen by occupational therapy for recent fracture of left UE. Occupational therapist is a certified lymphatic therapist as well. Occupational therapist with treatment pt for edema as well as for rehab from fracture. PLAN OF CARE     Defer to OT  PT re-evaluation only    Timed Code Treatment Minutes:   0  minutes   Total Treatment Time:   40 minutes    Plan of care sent to provider:      [x]Faxed  []Co-signature    (attempts: 1[x] 2 []3[])         Medicare Cap total YTD:   100 []N/A  Workers Comp Time Stamp  [x]N/A   Time In:   Time Out:    Thank you for the referral of this patient.       Erna Monsivais, PT MPT  License #[id number]

## 2023-01-05 ENCOUNTER — HOSPITAL ENCOUNTER (OUTPATIENT)
Dept: PHYSICAL THERAPY | Age: 68
Setting detail: THERAPIES SERIES
End: 2023-01-05
Payer: COMMERCIAL

## 2023-01-05 ENCOUNTER — HOSPITAL ENCOUNTER (OUTPATIENT)
Dept: OCCUPATIONAL THERAPY | Age: 68
Setting detail: THERAPIES SERIES
Discharge: HOME | End: 2023-01-05
Payer: COMMERCIAL

## 2023-01-05 NOTE — FLOWSHEET NOTE
Occupational Therapy  Cancellation/No-show Note  Patient Name:  Memo Hayden  :  1955   Date:  2023  Cancels to Date: 1  No-shows to Date: 0    For today's appointment patient:  [x] Cancelled  [] Rescheduled appointment  [] No-show     Reason given by patient:  [x] Patient ill  [] Conflicting appointment  [] No transportation    [] Conflict with work  [] No reason given  [] Other:     Comments:      Electronically signed by:    Manjit Espinoza OTR/L #[id number]

## 2023-01-10 ENCOUNTER — APPOINTMENT (OUTPATIENT)
Dept: PHYSICAL THERAPY | Age: 68
End: 2023-01-10
Payer: COMMERCIAL

## 2023-01-10 ENCOUNTER — HOSPITAL ENCOUNTER (OUTPATIENT)
Dept: OCCUPATIONAL THERAPY | Age: 68
Setting detail: THERAPIES SERIES
Discharge: HOME | End: 2023-01-10
Payer: MEDICARE

## 2023-01-10 PROCEDURE — 97140 MANUAL THERAPY 1/> REGIONS: CPT

## 2023-01-10 PROCEDURE — 97110 THERAPEUTIC EXERCISES: CPT

## 2023-01-10 NOTE — PROGRESS NOTES
[facility]      Outpatient Physical Therapy     [x] Daily Treatment Note   [] Progress Note   [] Discharge Note    Date:  1/10/2023    Patient Name:  Vida Karimi         YOB: 1955    Medical Diagnosis:   L radial neck fracture                                        ICD 10:  S52.135A     Treatment Diagnosis: Impaired L UE AROM and strength. 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 Latex Allergy:  [x]NO      []YES      Plan of care sent to provider:      [x]Faxed  []Co-signature    (attempts: 1[x] 2 []3[])         Plan of care signed:      []Yes date:            [x]No      Progress Note covers period from (if applicable):    [x]NA    []From          To           Next Progress Note due:  1 /31/23    Visit# / total visits:  2/12    Plan for Next Session:     Exercise  Soft tissues mobilization  Modified techniques for ADLs  MLD  Wrapping/compression    Subjective:  Patient reports participation in AROM exercises. More pain in R shoulder compared to L arm. Patient tolerated tubigrip on L arm. Pain level:   3/10 in R shoulder      Objective:       Exercises:    Exercises in bold performed in department today. Items not bolded are carried forward from prior visits for continuity of the record.   Exercise/Equipment Resistance/Repetitions HEP Other comments     Wrist flex/ext/radial deviation  1x10 []      Median nerve glides 1x10 []      L shoulder AAROM: flexion, abduction, horizontal abd/add 1x10 []        []        []        []        []        []        []          []         []        []        []        []        []        []        []        []      Therapeutic Exercise/Home Exercise Program:   30 minutes    Therapeutic Activity:  0 minutes     Gait: 0 minutes    Neuromuscular Re-Education:  0 minutes      Canalith Repositioning Procedure:  0 minutes    Manual Therapy: 30 minutes  Soft tissue mobilization to L axilla  MLD to L UE  Wrapping L UE (stockinet, cast padding, short stretch bandage- 6cm, tubigrip E)      Modalities: 0 minutes    Functional Outcome Measure:      Functional Scale/Score:       UEFI  Date                                         1/3/2023            Intake Score:                           21/80    Assessment/Treatment/Activity Tolerance:    Patients response to treatment:   [x]Patient tolerated treatment well []Patient limited by fatigue   []Patient limited by pain  []Patient limited by other medical complications   []Other:     Goals:   Short-Term Goals at 3 weeks:   [x]  Patient and/or care giver will understand lymphedema precautions to decrease the risk of infection and acerbation of the lymphedema. [x] Patient will perform HEP with minimal assistance to help improve lymphatic flow and venous return. [x] Patient will perform a self-MLD protocol with minimal assistance to help reduce swelling and thus improve ROM and mobility. Long-Term Goals at 6 weeks:   [x] Pt will have a decrease in pain to 0/10 to facilitate improvement in ADLs   [x] Patient will experience increased range of motion in L UE to improve independence wit ADLs   [x] Patient will be independent with donning and doffing of compression garments which will enable regular daily garment wear.     [x] Treatment will achieve maximum edema and/or lymphedema reduction to enable functional improvements such as fitting into standard-size clothing and shoes, a return to a prior level of function, improved balance, and reduced risk of falling. [x] Patient and/or care giver will be independent with HEP and lymphedema management to help prevent edema relapse and reduce risk of infection. [x] Pt to report a score of 30 or more on UEFI  for increased performance with daily activities.        Prognosis: [x]Good   []Fair   []Poor    Patient Requires Follow-up:  [x]Yes  []No    Plan: []Plan of care initiated     [x]Continue per plan of care    [] Alter current plan (see comments)    []Hold pending MD visit []Discharge    Timed Code Treatment Minutes:  60 minutes    Total Treatment Minutes:  60 minutes    Medicare Cap total YTD:   []N/A    Workers Comp Time Stamp  [x]N/A   Time In:   Time Out:    Electronically signed by:    Denita Sinclair OTR/MEIR #[id number]

## 2023-01-12 ENCOUNTER — HOSPITAL ENCOUNTER (OUTPATIENT)
Dept: OCCUPATIONAL THERAPY | Age: 68
Setting detail: THERAPIES SERIES
Discharge: HOME | End: 2023-01-12
Payer: MEDICARE

## 2023-01-12 ENCOUNTER — APPOINTMENT (OUTPATIENT)
Dept: PHYSICAL THERAPY | Age: 68
End: 2023-01-12
Payer: COMMERCIAL

## 2023-01-12 PROCEDURE — 97110 THERAPEUTIC EXERCISES: CPT

## 2023-01-12 PROCEDURE — 97140 MANUAL THERAPY 1/> REGIONS: CPT

## 2023-01-13 NOTE — PROGRESS NOTES
[facility]      Outpatient Physical Therapy     [x] Daily Treatment Note   [] Progress Note   [] Discharge Note    Date:  1/13/2023    Patient Name:  Dasia Figueroa         YOB: 1955    Medical Diagnosis:   L radial neck fracture                                        ICD 10:  S52.135A     Treatment Diagnosis: Impaired L UE AROM and strength. 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 Latex Allergy:  [x]NO      []YES      Plan of care sent to provider:      [x]Faxed  []Co-signature    (attempts: 1[x] 2 []3[])         Plan of care signed:      []Yes date:            [x]No      Progress Note covers period from (if applicable):    [x]NA    []From          To           Next Progress Note due:  1 /31/23    Visit# / total visits:  3/12    Plan for Next Session:     Exercise  Soft tissues mobilization  Modified techniques for ADLs  MLD  Wrapping/compression    Subjective:  Patient reports wrap began to hurt last night and took it off. Patient donned tubigrip. Pain level:   3/10 in R shoulder      Objective:   L upper arm appears to have less edema    Exercises:    Exercises in bold performed in department today. Items not bolded are carried forward from prior visits for continuity of the record.   Exercise/Equipment Resistance/Repetitions HEP Other comments     Wrist flex/ext/radial deviation  1x10 []      Median nerve glides 1x10 []      L shoulder AAROM: flexion, abduction, horizontal abd/add 1x10 []      Table slides with L UE 1x10 []        []        []        []        []        []          []         []        []        []        []        []        []        []        []      Therapeutic Exercise/Home Exercise Program:   30 minutes    Therapeutic Activity:  0 minutes     Gait: 0 minutes    Neuromuscular Re-Education:  0 minutes      Canalith Repositioning Procedure:  0 minutes    Manual Therapy: 30 minutes  Soft tissue mobilization to L axilla  MLD to L UE  Applied kinesiotape to L upper arm and donned tubigrip from wrist to axilla. Modalities: 0 minutes    Functional Outcome Measure:      Functional Scale/Score:       UEFI  Date                                         1/3/2023            Intake Score:                           21/80    Assessment/Treatment/Activity Tolerance:    Patients response to treatment:   [x]Patient tolerated treatment well []Patient limited by fatigue   []Patient limited by pain  []Patient limited by other medical complications   []Other:     Goals:   Short-Term Goals at 3 weeks:   [x]  Patient and/or care giver will understand lymphedema precautions to decrease the risk of infection and acerbation of the lymphedema. [x] Patient will perform HEP with minimal assistance to help improve lymphatic flow and venous return. [x] Patient will perform a self-MLD protocol with minimal assistance to help reduce swelling and thus improve ROM and mobility. Long-Term Goals at 6 weeks:   [x] Pt will have a decrease in pain to 0/10 to facilitate improvement in ADLs   [x] Patient will experience increased range of motion in L UE to improve independence wit ADLs   [x] Patient will be independent with donning and doffing of compression garments which will enable regular daily garment wear.     [x] Treatment will achieve maximum edema and/or lymphedema reduction to enable functional improvements such as fitting into standard-size clothing and shoes, a return to a prior level of function, improved balance, and reduced risk of falling. [x] Patient and/or care giver will be independent with HEP and lymphedema management to help prevent edema relapse and reduce risk of infection. [x] Pt to report a score of 30 or more on UEFI  for increased performance with daily activities.        Prognosis: [x]Good   []Fair   []Poor    Patient Requires Follow-up:  [x]Yes  []No    Plan: []Plan of care initiated     [x]Continue per plan of care    [] Alter current plan (see comments)    []Hold pending MD visit []Discharge    Timed Code Treatment Minutes:  60 minutes    Total Treatment Minutes:  60 minutes    Medicare Cap total YTD:   []N/A    Workers Comp Time Stamp  [x]N/A   Time In:   Time Out:    Electronically signed by:    Avis Kim OTR/L #[id number]

## 2023-01-16 ENCOUNTER — ANTI-COAG VISIT (OUTPATIENT)
Dept: PHARMACY | Age: 68
End: 2023-01-16
Payer: COMMERCIAL

## 2023-01-16 DIAGNOSIS — I48.0 PAROXYSMAL ATRIAL FIBRILLATION (HCC): Primary | ICD-10-CM

## 2023-01-16 LAB — INR BLD: 2.5

## 2023-01-16 PROCEDURE — 85610 PROTHROMBIN TIME: CPT

## 2023-01-16 PROCEDURE — 99211 OFF/OP EST MAY X REQ PHY/QHP: CPT

## 2023-01-16 NOTE — PROGRESS NOTES
Ms. Lynda Harmon is here for management of anticoagulation for Afib. PMH also significant for GERD, anxiety, depression, cardiomyopathy, hypothyroid, chronic back pain, hx of breast cancer, pacemaker placement, back surgery, and osteopenia. Pt verifies dosing regimen. Pt denies s/s bleeding/bruising/swelling/SOB. Pt reports nose bleeds on and off for 3 days. No changes in Rx/OTC/Herbal medications. Using Vegamour hair serum for [place] loss. She is on oxycodone for back pain. Pt is still having a couple servings of greens per week. Pt does not smoke and rarely drinks EtOH. Pt is still under a lot of stress, plus she broke her arm. Getting shoulder surgery on Feb 21st    INR 2.5 is within the therapeutic range of 2-3. Recommend to continue 3mg daily, except 4.5mg Mon. Patient has warfarin 3 mg tablets. Will continue to monitor and check INR in 4 weeks, pt cannot come back sooner. Dosing reminder card given with phone number, appointment date and time.   Return to clinic: 2/13  at 1300  Referring physician: Dr. Fayetta Boeck

## 2023-01-17 ENCOUNTER — APPOINTMENT (OUTPATIENT)
Dept: PHYSICAL THERAPY | Age: 68
End: 2023-01-17
Payer: COMMERCIAL

## 2023-01-17 ENCOUNTER — HOSPITAL ENCOUNTER (OUTPATIENT)
Dept: OCCUPATIONAL THERAPY | Age: 68
Setting detail: THERAPIES SERIES
Discharge: HOME | End: 2023-01-17
Payer: MEDICARE

## 2023-01-17 PROCEDURE — 97140 MANUAL THERAPY 1/> REGIONS: CPT

## 2023-01-17 PROCEDURE — 97110 THERAPEUTIC EXERCISES: CPT

## 2023-01-18 NOTE — PROGRESS NOTES
[facility]      Outpatient Physical Therapy     [x] Daily Treatment Note   [] Progress Note   [] Discharge Note    Date:  1/18/2023    Patient Name:  Arielle Olivares         YOB: 1955    Medical Diagnosis:   L radial neck fracture                                        ICD 10:  S52.135A     Treatment Diagnosis: Impaired L UE AROM and strength. 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 Latex Allergy:  [x]NO      []YES      Plan of care sent to provider:      [x]Faxed  []Co-signature    (attempts: 1[x] 2 []3[])         Plan of care signed:      []Yes date:            [x]No      Progress Note covers period from (if applicable):    [x]NA    []From          To           Next Progress Note due:  1 /31/23    Visit# / total visits:  4/12    Plan for Next Session:     Exercise  Soft tissues mobilization  Modified techniques for ADLs  MLD  Wrapping/compression    Subjective:  Patient reports no concerns with kinesiotape or compression garment on L UE. Pain level:   3/10 in R shoulder      Objective:   L upper arm appears to have less edema    Exercises:    Exercises in bold performed in department today. Items not bolded are carried forward from prior visits for continuity of the record.   Exercise/Equipment Resistance/Repetitions HEP Other comments     Wrist flex/ext/radial deviation  1x10 []      Median nerve glides 1x10 []      L shoulder AAROM: flexion, abduction, horizontal abd/add 1x10 [] Table slides with L UE 1x10 []        []        []        []        []        []          []         []        []        []        []        []        []        []        []      Therapeutic Exercise/Home Exercise Program:   15 minutes    Therapeutic Activity:  0 minutes     Gait: 0 minutes    Neuromuscular Re-Education:  0 minutes      Canalith Repositioning Procedure:  0 minutes    Manual Therapy: 30 minutes  Soft tissue mobilization to L axilla  MLD to L UE      Modalities: 0 minutes    GIRTH MEASUREMENT FLOW SHEET     Upper/ Extremity R L R L L   Date 11/15 11/15 1/3/2023 1/3/2023 1/17          6 Inches above bend of elbow                           27.8 27.2 28.3 29.2 28.6         3  Inches above bend of elbow 25 28.5 24.7 28.7 27           Bend of elbow 22.1 23 22.5 23.8 22.5           3 Inches below bend of elbow 21.9 21.7 21.8 21.4 19.5          6 Inches below bend of elbow 17.6 15.9 17.2 15.7 15.4   Wrist-Styloids                                   14 14 14.1 13.6 13.5   Distal Palmar Crease 17.7 14.4 16.1 14.6 13.4   Thumbs Proximal Phalanx 5.4 5 5.3 5 5   Base of 3rd digit 5 4.8 5 4.7 4.7                TOTAL GIRTH          156.5                    154.5         155       156.7 149. 6   Left is greater than right. Noted increase in edema at elbow  and above on left compared to right. Functional Outcome Measure:        Functional Scale/Score:       UEFI  Date                                         1/3/2023            Intake Score:                           21/80      Assessment/Treatment/Activity Tolerance:    Patients response to treatment:   [x]Patient tolerated treatment well []Patient limited by fatigue   []Patient limited by pain  []Patient limited by other medical complications   []Other:     Goals:   Short-Term Goals at 3 weeks:   [x]  Patient and/or care giver will understand lymphedema precautions to decrease the risk of infection and acerbation of the lymphedema.     [x] Patient will perform HEP with minimal assistance to help improve lymphatic flow and venous return. [x] Patient will perform a self-MLD protocol with minimal assistance to help reduce swelling and thus improve ROM and mobility. Long-Term Goals at 6 weeks:   [x] Pt will have a decrease in pain to 0/10 to facilitate improvement in ADLs   [x] Patient will experience increased range of motion in L UE to improve independence wit ADLs   [x] Patient will be independent with donning and doffing of compression garments which will enable regular daily garment wear. [x] Treatment will achieve maximum edema and/or lymphedema reduction to enable functional improvements such as fitting into standard-size clothing and shoes, a return to a prior level of function, improved balance, and reduced risk of falling. [x] Patient and/or care giver will be independent with HEP and lymphedema management to help prevent edema relapse and reduce risk of infection. [x] Pt to report a score of 30 or more on UEFI  for increased performance with daily activities.        Prognosis: [x]Good   []Fair   []Poor    Patient Requires Follow-up:  [x]Yes  []No    Plan: []Plan of care initiated     [x]Continue per plan of care    [] Alter current plan (see comments)    []Hold pending MD visit []Discharge    Timed Code Treatment Minutes: 45 minutes    Total Treatment Minutes:  45 minutes    Medicare Cap total YTD:   []N/A    Workers Comp Time Stamp  [x]N/A   Time In:   Time Out:    Electronically signed by:    Jai Coello OTR/L #[id number]

## 2023-01-19 ENCOUNTER — APPOINTMENT (OUTPATIENT)
Dept: PHYSICAL THERAPY | Age: 68
End: 2023-01-19
Payer: COMMERCIAL

## 2023-01-19 ENCOUNTER — HOSPITAL ENCOUNTER (OUTPATIENT)
Dept: OCCUPATIONAL THERAPY | Age: 68
Setting detail: THERAPIES SERIES
Discharge: HOME | End: 2023-01-19
Payer: MEDICARE

## 2023-01-19 PROCEDURE — 97110 THERAPEUTIC EXERCISES: CPT

## 2023-01-19 PROCEDURE — 97140 MANUAL THERAPY 1/> REGIONS: CPT

## 2023-01-19 NOTE — PROGRESS NOTES
[facility]      Outpatient Physical Therapy     [x] Daily Treatment Note   [] Progress Note   [] Discharge Note    Date:  1/19/2023    Patient Name:  Singh Allison         YOB: 1955    Medical Diagnosis:   L radial neck fracture                                        ICD 10:  S52.135A     Treatment Diagnosis: Impaired L UE AROM and strength. 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 Latex Allergy:  [x]NO      []YES      Plan of care sent to provider:      [x]Faxed  []Co-signature    (attempts: 1[x] 2 []3[])         Plan of care signed:      []Yes date:            [x]No      Progress Note covers period from (if applicable):    [x]NA    []From          To           Next Progress Note due:  1 /31/23    Visit# / total visits:  5/12    Plan for Next Session:     Exercise  Soft tissues mobilization  Modified techniques for ADLs  MLD  Wrapping/compression    Subjective:  Patient reports no concerns with compression garment on L UE. Pain level:   3/10 in R shoulder      Objective:   L upper arm appears to have less edema    Exercises:    Exercises in bold performed in department today. Items not bolded are carried forward from prior visits for continuity of the record.   Exercise/Equipment Resistance/Repetitions HEP Other comments     Wrist flex/ext/radial deviation  1x10 []      Median nerve glides 1x10 []      L shoulder AAROM: flexion, abduction, horizontal abd/add 1x10 []      Table slides with L UE 1x10 []        []        []        []        []        []          []         []        []        []        []        []        []        []        []      Therapeutic Exercise/Home Exercise Program:  30 minutes    Therapeutic Activity:  0 minutes     Gait: 0 minutes    Neuromuscular Re-Education:  0 minutes      Canalith Repositioning Procedure:  0 minutes    Manual Therapy: 30 minutes  Soft tissue mobilization to L axilla  MLD to L UE  Applied kinesiotape to L upper arm and donned tubigrip from wrist to axilla. Modalities: 0 minutes    GIRTH MEASUREMENT FLOW SHEET     Upper/ Extremity R L R L L   Date 11/15 11/15 1/3/2023 1/3/2023 1/17          6 Inches above bend of elbow                           27.8 27.2 28.3 29.2 28.6         3  Inches above bend of elbow 25 28.5 24.7 28.7 27           Bend of elbow 22.1 23 22.5 23.8 22.5           3 Inches below bend of elbow 21.9 21.7 21.8 21.4 19.5          6 Inches below bend of elbow 17.6 15.9 17.2 15.7 15.4   Wrist-Styloids                                   14 14 14.1 13.6 13.5   Distal Palmar Crease 17.7 14.4 16.1 14.6 13.4   Thumbs Proximal Phalanx 5.4 5 5.3 5 5   Base of 3rd digit 5 4.8 5 4.7 4.7                TOTAL GIRTH          156.5                    154.5         155       156.7 149. 6   Left is greater than right. Noted increase in edema at elbow  and above on left compared to right.       Functional Outcome Measure:        Functional Scale/Score:       UEFI  Date                                         1/3/2023            Intake Score:                           21/80      Assessment/Treatment/Activity Tolerance:    Patients response to treatment:   [x]Patient tolerated treatment well []Patient limited by fatigue   []Patient limited by pain  []Patient limited by other medical complications   []Other:     Goals:   Short-Term Goals at 3 weeks:   [x]  Patient and/or care giver will understand lymphedema precautions to decrease the risk of infection and acerbation of the lymphedema.    [x] Patient will perform HEP with minimal assistance to help improve lymphatic flow and venous return.    [x] Patient will perform a self-MLD protocol with minimal assistance to help reduce swelling and thus improve ROM and mobility.      Long-Term Goals at 6 weeks:   [x] Pt will have a decrease in pain to 0/10 to facilitate improvement in ADLs   [x] Patient will experience increased range of motion in L UE to improve independence wit ADLs   [x] Patient will be independent with donning and doffing of compression garments which will enable regular daily garment wear.    [x] Treatment will achieve maximum edema and/or lymphedema reduction to enable functional improvements such as fitting into standard-size clothing and shoes, a return to a prior level of function, improved balance, and reduced risk of falling.    [x] Patient and/or care giver will be independent with HEP and lymphedema management to help prevent edema relapse and reduce risk of infection.  [x] Pt to report a score of 30 or more on UEFI  for increased performance with daily activities.       Prognosis: [x]Good   []Fair   []Poor    Patient Requires Follow-up:  [x]Yes  []No    Plan: []Plan of care initiated     [x]Continue per plan of care    [] Alter current plan (see comments)    []Hold pending MD visit []Discharge    Timed Code Treatment Minutes: 60 minutes    Total Treatment Minutes:  60 minutes    Medicare Cap total YTD:   []N/A    Workers Comp Time Stamp  [x]N/A   Time In:   Time Out:    Electronically signed by:    Maria Esther Reyez OTR/L #[id number]

## 2023-01-24 ENCOUNTER — APPOINTMENT (OUTPATIENT)
Dept: PHYSICAL THERAPY | Age: 68
End: 2023-01-24
Payer: COMMERCIAL

## 2023-01-24 ENCOUNTER — APPOINTMENT (OUTPATIENT)
Dept: OCCUPATIONAL THERAPY | Age: 68
End: 2023-01-24
Payer: COMMERCIAL

## 2023-01-26 ENCOUNTER — APPOINTMENT (OUTPATIENT)
Dept: PHYSICAL THERAPY | Age: 68
End: 2023-01-26
Payer: COMMERCIAL

## 2023-01-26 ENCOUNTER — HOSPITAL ENCOUNTER (OUTPATIENT)
Dept: OCCUPATIONAL THERAPY | Age: 68
Setting detail: THERAPIES SERIES
Discharge: HOME | End: 2023-01-26
Payer: COMMERCIAL

## 2023-01-26 PROCEDURE — 97110 THERAPEUTIC EXERCISES: CPT

## 2023-01-26 PROCEDURE — 97140 MANUAL THERAPY 1/> REGIONS: CPT

## 2023-01-26 NOTE — PROGRESS NOTES
[facility]      Outpatient Physical Therapy     [x] Daily Treatment Note   [] Progress Note   [] Discharge Note    Date:  1/26/2023    Patient Name:  Memo Hayden         YOB: 1955    Medical Diagnosis:   L radial neck fracture                                        ICD 10:  S52.135A     Treatment Diagnosis: Impaired L UE AROM and strength. 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 Latex Allergy:  [x]NO      []YES      Plan of care sent to provider:      [x]Faxed  []Co-signature    (attempts: 1[x] 2 []3[])         Plan of care signed:      []Yes date:            [x]No      Progress Note covers period from (if applicable):    [x]NA    []From          To           Next Progress Note due:  1 /31/23    Visit# / total visits:  6/12    Plan for Next Session:     Exercise  Soft tissues mobilization  Modified techniques for ADLs  MLD  Wrapping/compression    Subjective:  Patient reports no concerns with compression garment on L UE. Patient is frustrated with sling and is hoping it is discontinued from ortho next week. Pain level:   3/10 in R shoulder      Objective:   L upper arm appears to have less edema    Exercises:    Exercises in bold performed in department today. Items not bolded are carried forward from prior visits for continuity of the record.   Exercise/Equipment Resistance/Repetitions HEP Other comments     Wrist flex/ext/radial deviation  1x10 []      Median nerve glides 1x10 [] L shoulder AAROM: flexion, abduction, horizontal abd/add 1x10 []      Table slides with L UE 1x10 []        []      Cane AAROM exercises:  Shoulder Internal/ext rotation, shoulder abd/add, shoulder flex/ext 1x10 []        []        []        []          []         []        []        []        []        []        []        []        []      Therapeutic Exercise/Home Exercise Program:  15 minutes    Therapeutic Activity:  0 minutes     Gait: 0 minutes    Neuromuscular Re-Education:  0 minutes      Canalith Repositioning Procedure:  0 minutes    Manual Therapy: 30 minutes  Soft tissue mobilization to L axilla  MLD to L UE      Modalities: 0 minutes    GIRTH MEASUREMENT FLOW SHEET     Upper/ Extremity R L R L L   Date 11/15 11/15 1/3/2023 1/3/2023 1/17          6 Inches above bend of elbow                           27.8 27.2 28.3 29.2 28.6         3  Inches above bend of elbow 25 28.5 24.7 28.7 27           Bend of elbow 22.1 23 22.5 23.8 22.5           3 Inches below bend of elbow 21.9 21.7 21.8 21.4 19.5          6 Inches below bend of elbow 17.6 15.9 17.2 15.7 15.4   Wrist-Styloids                                   14 14 14.1 13.6 13.5   Distal Palmar Crease 17.7 14.4 16.1 14.6 13.4   Thumbs Proximal Phalanx 5.4 5 5.3 5 5   Base of 3rd digit 5 4.8 5 4.7 4.7                TOTAL GIRTH          156.5                    154.5         155       156.7 149. 6   Left is greater than right. Noted increase in edema at elbow  and above on left compared to right.       Functional Outcome Measure:        Functional Scale/Score:       UEFI  Date                                         1/3/2023            Intake Score:                           21/80      Assessment/Treatment/Activity Tolerance:    Patients response to treatment:   [x]Patient tolerated treatment well []Patient limited by fatigue   []Patient limited by pain  []Patient limited by other medical complications   []Other:     Goals:   Short-Term Goals at 3 weeks: [x]  Patient and/or care giver will understand lymphedema precautions to decrease the risk of infection and acerbation of the lymphedema. [x] Patient will perform HEP with minimal assistance to help improve lymphatic flow and venous return. [x] Patient will perform a self-MLD protocol with minimal assistance to help reduce swelling and thus improve ROM and mobility. Long-Term Goals at 6 weeks:   [x] Pt will have a decrease in pain to 0/10 to facilitate improvement in ADLs   [x] Patient will experience increased range of motion in L UE to improve independence wit ADLs   [x] Patient will be independent with donning and doffing of compression garments which will enable regular daily garment wear. [x] Treatment will achieve maximum edema and/or lymphedema reduction to enable functional improvements such as fitting into standard-size clothing and shoes, a return to a prior level of function, improved balance, and reduced risk of falling. [x] Patient and/or care giver will be independent with HEP and lymphedema management to help prevent edema relapse and reduce risk of infection. [x] Pt to report a score of 30 or more on UEFI  for increased performance with daily activities.        Prognosis: [x]Good   []Fair   []Poor    Patient Requires Follow-up:  [x]Yes  []No    Plan: []Plan of care initiated     [x]Continue per plan of care    [] Alter current plan (see comments)    []Hold pending MD visit []Discharge    Timed Code Treatment Minutes: 45 minutes    Total Treatment Minutes:  45 minutes    Medicare Cap total YTD:   []N/A    Workers Comp Time Stamp  [x]N/A   Time In:   Time Out:    Electronically signed by:    Amol Manley OTR/L #[id number]

## 2023-02-07 ENCOUNTER — HOSPITAL ENCOUNTER (OUTPATIENT)
Dept: OCCUPATIONAL THERAPY | Age: 68
Setting detail: THERAPIES SERIES
Discharge: HOME | End: 2023-02-07
Payer: COMMERCIAL

## 2023-02-07 PROCEDURE — 97140 MANUAL THERAPY 1/> REGIONS: CPT

## 2023-02-07 PROCEDURE — 97110 THERAPEUTIC EXERCISES: CPT

## 2023-02-07 NOTE — PROGRESS NOTES
[facility]      Outpatient Physical Therapy     [x] Daily Treatment Note   [x] Progress Note   [] Discharge Note    Date:  2/7/2023    Patient Name:  Nelly Metcalf         YOB: 1955    Medical Diagnosis:   L radial neck fracture                                        ICD 10:  S52.135A     Treatment Diagnosis: Impaired L UE AROM and strength. 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 Latex Allergy:  [x]NO      []YES      Plan of care sent to provider:      [x]Faxed  []Co-signature    (attempts: 1[x] 2 []3[])         Plan of care signed:      []Yes date:            [x]No      Progress Note covers period from (if applicable):    [x]NA    []From          To           Next Progress Note due:  3/7/23    Visit# / total visits:  7/12    Plan for Next Session:     Exercise  Soft tissues mobilization  Modified techniques for ADLs  MLD  Wrapping/compression    Subjective:  Patient reports no concerns with compression garment on L UE. Patient saw ortho MD last week. Sling has been discontinued but the fracture is not completely healed. Patient should continue to not lift heavy objects or push/pull with the L UE. Pain level:   5/10 in R shoulder    8/10 in R shoulder      Objective:   L upper arm appears to have less edema    Exercises:    Exercises in bold performed in department today. Items not bolded are carried forward from prior visits for continuity of the record.   Exercise/Equipment Resistance/Repetitions HEP Other comments     Wrist flex/ext/radial deviation  1x10 []      Median nerve glides 1x10 []      L shoulder AAROM: flexion, abduction, horizontal abd/add 1x10 []      Table slides with L UE 1x10 []        []      Cane AAROM exercises:  Shoulder Internal/ext rotation, shoulder abd/add, shoulder flex/ext 1x10 []        []        []        []          []         []        []        []        []        []        []        []        []      Therapeutic Exercise/Home Exercise Program:  15 minutes    Therapeutic Activity:  0 minutes     Gait: 0 minutes    Neuromuscular Re-Education:  0 minutes      Canalith Repositioning Procedure:  0 minutes    Manual Therapy: 30 minutes  Soft tissue mobilization to L axilla  MLD to L UE      Modalities: 0 minutes    GIRTH MEASUREMENT FLOW SHEET     Upper/ Extremity R L R L L L   Date 11/15 11/15 1/3/2023 1/3/2023 1/17 2/7          6 Inches above bend of elbow                           27.8 27.2 28.3 29.2 28.6 27.7         3  Inches above bend of elbow 25 28.5 24.7 28.7 27 26.9           Bend of elbow 22.1 23 22.5 23.8 22.5 22.9           3 Inches below bend of elbow 21.9 21.7 21.8 21.4 19.5 20          6 Inches below bend of elbow 17.6 15.9 17.2 15.7 15.4 15.3   Wrist-Styloids                                   14 14 14.1 13.6 13.5 14   Distal Palmar Crease 17.7 14.4 16.1 14.6 13.4 13.7   Thumbs Proximal Phalanx 5.4 5 5.3 5 5 5   Base of 3rd digit 5 4.8 5 4.7 4.7 4.6                 TOTAL GIRTH          156.5                    154.5         155       156.7 149. 6 150.1   Left is greater than right. Noted increase in edema at elbow  and above on left compared to right. UE Range of motion_left UE limited through out secondary to previous surgery. Abduction 5 and flexion 3.  Pt able to perform elbow flexion and extension, wrist flexion and finger flexion and extension with arm supported on pillows.     -axillary webbing/cording present    Subluxation in L shoulder is 3cm      Functional Outcome Measure:        Functional Scale/Score:       UEFI  Date                                         1/3/2023            Intake Score:                           21/80      Assessment/Treatment/Activity Tolerance:  Patient is meeting 5/10 goals. Her lymphedema has improved and she is tolerating her compression garment. Patient continues to have increased pain in her L UE. Recommend patient purchasing a brace or Givmohr sling to help stabilize the L shoulder due to subluxation. Patient would benefit from 4 more visits to continue to improve functional use of L UE and to decrease pain. Patients response to treatment:   [x]Patient tolerated treatment well []Patient limited by fatigue   []Patient limited by pain  []Patient limited by other medical complications   []Other:     Goals:   Short-Term Goals at 3 weeks:   [x]  Patient and/or care giver will understand lymphedema precautions to decrease the risk of infection and acerbation of the lymphedema. -GOAL MET   [x] Patient will perform HEP with minimal assistance to help improve lymphatic flow and venous return. GOAL MET   [x] Patient will perform a self-MLD protocol with minimal assistance to help reduce swelling and thus improve ROM and mobility. -GOAL MET     Long-Term Goals at 6 weeks:   [x] Pt will have a decrease in pain to 0/10 to facilitate improvement in ADLs   [x] Patient will experience increased range of motion in L UE to improve independence wit ADLs   [x] Patient will be independent with donning and doffing of compression garments which will enable regular daily garment wear. GOAL MET   [x] Treatment will achieve maximum edema and/or lymphedema reduction to enable functional improvements such as fitting into standard-size clothing and return to a prior level of function.  -GOAL MET   [x] Patient and/or care giver will be independent with HEP and lymphedema management to help prevent edema relapse and reduce risk of infection. [x] Pt to report a score of 30 or more on UEFI  for increased performance with daily activities.        Prognosis: [x]Good   []Fair   []Poor    Patient Requires Follow-up:  [x]Yes  []No    Plan: []Plan of care initiated     [x]Continue per plan of care    [] Alter current plan (see comments)    []Hold pending MD visit []Discharge    Timed Code Treatment Minutes: 60 minutes    Total Treatment Minutes:  60 minutes    Medicare Cap total YTD:   []N/A    Workers Comp Time Stamp  [x]N/A   Time In:   Time Out:    Electronically signed by:    Nadya Quinones OTR/L #[id number]

## 2023-02-13 ENCOUNTER — ANTI-COAG VISIT (OUTPATIENT)
Dept: PHARMACY | Age: 68
End: 2023-02-13
Payer: COMMERCIAL

## 2023-02-13 DIAGNOSIS — I48.0 PAROXYSMAL ATRIAL FIBRILLATION (HCC): Primary | ICD-10-CM

## 2023-02-13 LAB — INTERNATIONAL NORMALIZATION RATIO, POC: 2.4

## 2023-02-13 PROCEDURE — 99211 OFF/OP EST MAY X REQ PHY/QHP: CPT | Performed by: INTERNAL MEDICINE

## 2023-02-13 PROCEDURE — 85610 PROTHROMBIN TIME: CPT | Performed by: INTERNAL MEDICINE

## 2023-02-13 NOTE — PROGRESS NOTES
Ms. Fitzpatrick Both is here for management of anticoagulation for Afib. PMH also significant for GERD, anxiety, depression, cardiomyopathy, hypothyroid, chronic back pain, hx of breast cancer, pacemaker placement, back surgery, and osteopenia. Pt verifies dosing regimen. Pt denies s/s bleeding/bruising/swelling/SOB. Pt reports nose bleeds on and off for 3 days. No changes in Rx/OTC/Herbal medications. Using Vegamour hair serum for [place] loss. She is on oxycodone for back pain. Pt is still having a couple servings of greens per week. Pt does not smoke and rarely drinks EtOH. Getting shoulder surgery on 4/4/23    INR 2.4 is within the therapeutic range of 2-3. Recommend to continue 3mg daily, except 4.5mg Mon. Patient has warfarin 3 mg tablets. Will continue to monitor and check INR in 4 weeks. Dosing reminder card given with phone number, appointment date and time. Return to clinic: 3/20  at 1300  Referring physician: Dr. Wilfredo Corrigan Only    Total # of Interventions Recommended: 0  Total # of Interventions Accepted: 0  Time Spent (min): 15    .

## 2023-02-13 NOTE — PROGRESS NOTES
Ms. Nilson Majano is here for management of anticoagulation for Afib. PMH also significant for GERD, anxiety, depression, cardiomyopathy, hypothyroid, chronic back pain, hx of breast cancer, pacemaker placement, back surgery, and osteopenia. Pt verifies dosing regimen. Pt denies s/s bleeding/bruising/swelling/SOB. Pt reports nose bleeds on and off for 3 days. No changes in Rx/OTC/Herbal medications. Using Vegamour hair serum for [place] loss. She is on oxycodone for back pain. Pt is still having a couple servings of greens per week. Pt does not smoke and rarely drinks EtOH. Getting shoulder surgery on 4/4/23    INR 2.4 is within the therapeutic range of 2-3. Recommend to continue 3mg daily, except 4.5mg Mon. Patient has warfarin 3 mg tablets. Will continue to monitor and check INR in 4 weeks. Dosing reminder card given with phone number, appointment date and time.   Return to clinic: 3/20  at 1300  Referring physician: Dr. Javi Alvarez Only      Total # of Interventions Recommended: 0  Total # of Interventions Accepted: 0  Time Spent (min): 15

## 2023-02-14 ENCOUNTER — HOSPITAL ENCOUNTER (OUTPATIENT)
Dept: OCCUPATIONAL THERAPY | Age: 68
Setting detail: THERAPIES SERIES
Discharge: HOME | End: 2023-02-14
Payer: COMMERCIAL

## 2023-02-14 PROCEDURE — 97530 THERAPEUTIC ACTIVITIES: CPT

## 2023-02-14 PROCEDURE — 97140 MANUAL THERAPY 1/> REGIONS: CPT

## 2023-02-14 PROCEDURE — 97110 THERAPEUTIC EXERCISES: CPT

## 2023-02-14 NOTE — PROGRESS NOTES
[facility]      Outpatient Physical Therapy     [x] Daily Treatment Note   [] Progress Note   [] Discharge Note    Date:  2/14/2023    Patient Name:  Krista Alanis         YOB: 1955    Medical Diagnosis:   L radial neck fracture                                        ICD 10:  S52.135A     Treatment Diagnosis: Impaired L UE AROM and strength. 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 Latex Allergy:  [x]NO      []YES      Plan of care sent to provider:      [x]Faxed  []Co-signature    (attempts: 1[x] 2 []3[])         Plan of care signed:      []Yes date:            [x]No      Progress Note covers period from (if applicable):    [x]NA    []From          To           Next Progress Note due:  3/7/23    Visit# / total visits:  8/12    Plan for Next Session:     Exercise  Soft tissues mobilization  Modified techniques for ADLs  MLD  Wrapping/compression    Subjective:  Patient reports  is in the hospital and she is having difficulty with managing compression garment on her own. Patient purchased brace for L shoulder to assist with subluxation. Pain level:   5/10 in R shoulder      Objective:       Exercises:    Exercises in bold performed in department today. Items not bolded are carried forward from prior visits for continuity of the record.   Exercise/Equipment Resistance/Repetitions HEP Other comments     Wrist flex/ext/radial deviation  1x10 []      Median nerve glides 1x10 []      L shoulder AAROM: flexion, abduction, horizontal abd/add 1x10 []      Table slides with L UE 1x10 []        []      Cane AAROM exercises:  Shoulder Internal/ext rotation, shoulder abd/add, shoulder flex/ext 1x10 []        []        []        []          []         []        []        []        []        []        []        []        []      Therapeutic Exercise/Home Exercise Program:  15 minutes    Therapeutic Activity:  5 minutes  Educated on donning L shoulder brace. Gait: 0 minutes    Neuromuscular Re-Education:  0 minutes      Canalith Repositioning Procedure:  0 minutes    Manual Therapy: 30 minutes  Soft tissue mobilization to L axilla  MLD to L UE      Modalities: 0 minutes    GIRTH MEASUREMENT FLOW SHEET     Upper/ Extremity R L R L L L   Date 11/15 11/15 1/3/2023 1/3/2023 1/17 2/7          6 Inches above bend of elbow                           27.8 27.2 28.3 29.2 28.6 27.7         3  Inches above bend of elbow 25 28.5 24.7 28.7 27 26.9           Bend of elbow 22.1 23 22.5 23.8 22.5 22.9           3 Inches below bend of elbow 21.9 21.7 21.8 21.4 19.5 20          6 Inches below bend of elbow 17.6 15.9 17.2 15.7 15.4 15.3   Wrist-Styloids                                   14 14 14.1 13.6 13.5 14   Distal Palmar Crease 17.7 14.4 16.1 14.6 13.4 13.7   Thumbs Proximal Phalanx 5.4 5 5.3 5 5 5   Base of 3rd digit 5 4.8 5 4.7 4.7 4.6                 TOTAL GIRTH          156.5                    154.5         155       156.7 149. 6 150.1   Left is greater than right. Noted increase in edema at elbow  and above on left compared to right. UE Range of motion_left UE limited through out secondary to previous surgery. Abduction 5 and flexion 3.  Pt able to perform elbow flexion and extension, wrist flexion and finger flexion and extension with arm supported on pillows.     -axillary webbing/cording present    Subluxation in L shoulder is 3cm      Functional Outcome Measure:        Functional Scale/Score: UEFI  Date                                         1/3/2023            Intake Score:                           21/80      Assessment/Treatment/Activity Tolerance:  Patient is meeting 5/10 goals. Her lymphedema has improved and she is tolerating her compression garment. Patient continues to have increased pain in her L UE. Recommend patient purchasing a brace or Givmohr sling to help stabilize the L shoulder due to subluxation. Patient would benefit from 4 more visits to continue to improve functional use of L UE and to decrease pain. Patients response to treatment:   [x]Patient tolerated treatment well []Patient limited by fatigue   []Patient limited by pain  []Patient limited by other medical complications   []Other:     Goals:   Short-Term Goals at 3 weeks:   [x]  Patient and/or care giver will understand lymphedema precautions to decrease the risk of infection and acerbation of the lymphedema. -GOAL MET   [x] Patient will perform HEP with minimal assistance to help improve lymphatic flow and venous return. GOAL MET   [x] Patient will perform a self-MLD protocol with minimal assistance to help reduce swelling and thus improve ROM and mobility. -GOAL MET     Long-Term Goals at 6 weeks:   [x] Pt will have a decrease in pain to 0/10 to facilitate improvement in ADLs   [x] Patient will experience increased range of motion in L UE to improve independence wit ADLs   [x] Patient will be independent with donning and doffing of compression garments which will enable regular daily garment wear. GOAL MET   [x] Treatment will achieve maximum edema and/or lymphedema reduction to enable functional improvements such as fitting into standard-size clothing and return to a prior level of function. -GOAL MET   [x] Patient and/or care giver will be independent with HEP and lymphedema management to help prevent edema relapse and reduce risk of infection.   [x] Pt to report a score of 30 or more on UEFI  for increased performance with daily activities.        Prognosis: [x]Good   []Fair   []Poor    Patient Requires Follow-up:  [x]Yes  []No    Plan: []Plan of care initiated     [x]Continue per plan of care    [] Alter current plan (see comments)    []Hold pending MD visit []Discharge    Timed Code Treatment Minutes: 60 minutes    Total Treatment Minutes:  60 minutes    Medicare Cap total YTD:   []N/A    Workers Comp Time Stamp  [x]N/A   Time In:   Time Out:    Electronically signed by:    Javier Meek OTR/L #[id number]

## 2023-02-21 ENCOUNTER — HOSPITAL ENCOUNTER (OUTPATIENT)
Dept: OCCUPATIONAL THERAPY | Age: 68
Setting detail: THERAPIES SERIES
End: 2023-02-21
Payer: COMMERCIAL

## 2023-03-20 ENCOUNTER — ANTI-COAG VISIT (OUTPATIENT)
Dept: PHARMACY | Age: 68
End: 2023-03-20
Payer: COMMERCIAL

## 2023-03-20 DIAGNOSIS — I48.0 PAROXYSMAL ATRIAL FIBRILLATION (HCC): Primary | ICD-10-CM

## 2023-03-20 LAB — INR BLD: 2.8

## 2023-03-20 PROCEDURE — 99211 OFF/OP EST MAY X REQ PHY/QHP: CPT | Performed by: FAMILY MEDICINE

## 2023-03-20 PROCEDURE — 85610 PROTHROMBIN TIME: CPT | Performed by: FAMILY MEDICINE

## 2023-03-20 NOTE — PROGRESS NOTES
Ms. Ayleen Duron is here for management of anticoagulation for Afib. PMH also significant for GERD, anxiety, depression, cardiomyopathy, hypothyroid, chronic back pain, hx of breast cancer, pacemaker placement, back surgery, and osteopenia. Pt verifies dosing regimen. Pt denies s/s bleeding/bruising/swelling/SOB. Pt reports nose bleeds on and off for 3 days. No changes in Rx/OTC/Herbal medications. Using Vegamour hair serum for [place] loss. She is on oxycodone for back pain. Pt is still having a couple servings of greens per week. Pt does not smoke and rarely drinks EtOH. Getting shoulder surgery on 4/4/23. Doctor will stop warfarin about 5 days before surgery, then patient will restart in rehab facility. Lovenox injections will not be used. Patient says there is an INR machine at the facility but she is not confident in their abilities to take good care of her. No other changes. INR 2.8 is within the therapeutic range of 2-3. Recommend to continue 3mg daily, except 4.5mg Mon. Patient has warfarin 3 mg tablets. Will continue to monitor and check INR in 5 weeks. Dosing reminder card given with phone number, appointment date and time.   Return to clinic: 4/24 at 1300  Referring physician: Dr. Federico Rai  PharmD Candidate 2023    I have seen the patient and I agree with the assessment and plan created by the PharmD Candidate  Aidan Brock PharmD 3/20/2023 1:21 PM

## 2023-05-15 ENCOUNTER — ANTI-COAG VISIT (OUTPATIENT)
Dept: PHARMACY | Age: 68
End: 2023-05-15
Payer: COMMERCIAL

## 2023-05-15 DIAGNOSIS — I48.0 PAROXYSMAL ATRIAL FIBRILLATION (HCC): Primary | ICD-10-CM

## 2023-05-15 LAB — INTERNATIONAL NORMALIZATION RATIO, POC: 3.6

## 2023-05-15 PROCEDURE — 85610 PROTHROMBIN TIME: CPT | Performed by: INTERNAL MEDICINE

## 2023-05-15 PROCEDURE — 99211 OFF/OP EST MAY X REQ PHY/QHP: CPT | Performed by: INTERNAL MEDICINE

## 2023-05-15 NOTE — PROGRESS NOTES
Ms. Kelli Broussard is here for management of anticoagulation for Afib. PMH also significant for GERD, anxiety, depression, cardiomyopathy, hypothyroid, chronic back pain, hx of breast cancer, pacemaker placement, back surgery, and osteopenia. Pt verifies dosing regimen. Pt denies s/s bleeding/bruising/swelling/SOB. No changes in Rx/OTC/Herbal medications. Using Vegamour hair serum for [place] loss. She is on oxycodone for back pain. Pt is still having a couple servings of greens per week. Pt does not smoke and rarely drinks EtOH. Had shoulder surgery on 4/4/23. Patient says there was an INR machine at the facility but she is not confident in their abilities to take good care of her. Pt reports leaving rehab facility on 5/4. She states they messed with her warfarin dosing. She is currently in a lot of pain and having issues with ADL, including feeding herself. So I think the stress, plus less eating, is why her INR is elevated. INR 3.6 is above the therapeutic range of 2-3. Recommend to hold today, then reduce to 3mg daily due to stress. Patient has warfarin 3 mg tablets. Will continue to monitor and check INR in 4 weeks, per pt request, she is having trouble getting around. Dosing reminder card given with phone number, appointment date and time.   Return to clinic: 6/12 at 1300  Referring physician: Dr. Swapna Emery Only    Intervention Detail: Dose Adjustment: 1, reason: Therapy De-escalation  Total # of Interventions Recommended: 1  Total # of Interventions Accepted: 1  Time Spent (min): 15

## 2023-05-31 ENCOUNTER — HOSPITAL ENCOUNTER (EMERGENCY)
Age: 68
Discharge: HOME | End: 2023-05-31
Payer: COMMERCIAL

## 2023-05-31 VITALS
TEMPERATURE: 98.6 F | BODY MASS INDEX: 23.21 KG/M2 | HEIGHT: 63 IN | OXYGEN SATURATION: 95 % | HEART RATE: 60 BPM | RESPIRATION RATE: 16 BRPM | DIASTOLIC BLOOD PRESSURE: 53 MMHG | SYSTOLIC BLOOD PRESSURE: 112 MMHG | WEIGHT: 131 LBS

## 2023-05-31 DIAGNOSIS — Z79.01 CURRENT USE OF LONG TERM ANTICOAGULATION: ICD-10-CM

## 2023-05-31 DIAGNOSIS — H11.31 SUBCONJUNCTIVAL HEMORRHAGE OF RIGHT EYE: Primary | ICD-10-CM

## 2023-05-31 PROCEDURE — 6370000000 HC RX 637 (ALT 250 FOR IP): Performed by: PHYSICIAN ASSISTANT

## 2023-05-31 PROCEDURE — 99283 EMERGENCY DEPT VISIT LOW MDM: CPT

## 2023-05-31 RX ORDER — ERYTHROMYCIN 5 MG/G
OINTMENT OPHTHALMIC ONCE
Status: COMPLETED | OUTPATIENT
Start: 2023-05-31 | End: 2023-05-31

## 2023-05-31 RX ORDER — PREDNISOLONE ACETATE 10 MG/ML
1 SUSPENSION/ DROPS OPHTHALMIC
Status: DISCONTINUED | OUTPATIENT
Start: 2023-05-31 | End: 2023-05-31 | Stop reason: HOSPADM

## 2023-05-31 RX ADMIN — ERYTHROMYCIN: 5 OINTMENT OPHTHALMIC at 19:23

## 2023-05-31 RX ADMIN — PREDNISOLONE ACETATE 1 DROP: 10 SUSPENSION/ DROPS OPHTHALMIC at 19:23

## 2023-05-31 ASSESSMENT — PAIN SCALES - GENERAL: PAINLEVEL_OUTOF10: 8

## 2023-05-31 ASSESSMENT — PAIN - FUNCTIONAL ASSESSMENT: PAIN_FUNCTIONAL_ASSESSMENT: 0-10

## 2023-05-31 NOTE — ED NOTES
Visual acuity completed. Patient does wear glasses but does not currently have them with them. 20/50 bilaterally.      Cayetano Guerrero  05/31/23 [id number]

## 2023-06-08 ENCOUNTER — HOSPITAL ENCOUNTER (OUTPATIENT)
Dept: OCCUPATIONAL THERAPY | Age: 68
Setting detail: THERAPIES SERIES
Discharge: HOME | End: 2023-06-08
Payer: COMMERCIAL

## 2023-06-08 PROCEDURE — 97165 OT EVAL LOW COMPLEX 30 MIN: CPT

## 2023-06-08 PROCEDURE — 97140 MANUAL THERAPY 1/> REGIONS: CPT

## 2023-06-08 PROCEDURE — 97530 THERAPEUTIC ACTIVITIES: CPT

## 2023-06-08 NOTE — PLAN OF CARE
risk of infection and acerbation of the lymphedema. []  Patient will develop a tolerance for wearing multi-layer, short-stretch bandages between treatment sessions to facilitate limb decongestion. []  Patient will experience a decrease in pitting edema which will improve tissue health and decrease the risk of infection. []  Body weight will be compared to changes in edema to monitor for appropriate elimination of fluid to reduce the risk of cardiac overload (in cases of severe LE edema). [x] Patient will perform HEP with minimal assistance to help improve lymphatic flow and venous return. [x] Patient will perform a self-MLD protocol with minimal assistance to help reduce swelling and thus improve ROM and mobility. Long-Term Goals at 6 weeks:   [x] Pt will have a decrease in pain to 0/10 to facilitate improvement in ADLs   [x] Patient will experience increased range of motion in L UE to improve independence wit ADLs   [x] Patient will be independent with donning and doffing of compression garments which will enable regular daily garment wear. [x] Treatment will achieve maximum edema and/or lymphedema reduction to enable functional improvements such as fitting into standard-size clothing and shoes, a return to a prior level of function, improved balance, and reduced risk of falling. [x] Patient and/or care giver will be independent with HEP and lymphedema management to help prevent edema relapse and reduce risk of infection. [x] Pt to report a score of 30 or more on UEFI  for increased performance with daily activities.       PLAN OF CARE     To see patient  2 x/week for  6 weeks for the following treatment interventions:     [x]   Therapeutic Exercise  [x]   Modalities of Choice:   []   Vasopneumatic pump    [x] Lymphatouch     [x]   Manual Therapy  [x]   Neuromuscular Re-Education  []   Gait Training   [x]   Therapeutic Activity  [x]   Lymphedema precautions and prevention  [x]   Use of

## 2023-06-20 ENCOUNTER — HOSPITAL ENCOUNTER (OUTPATIENT)
Dept: PHYSICAL THERAPY | Age: 68
Setting detail: THERAPIES SERIES
Discharge: HOME | End: 2023-06-20
Payer: COMMERCIAL

## 2023-06-20 ENCOUNTER — HOSPITAL ENCOUNTER (OUTPATIENT)
Dept: OCCUPATIONAL THERAPY | Age: 68
Setting detail: THERAPIES SERIES
Discharge: HOME | End: 2023-06-20
Payer: COMMERCIAL

## 2023-06-20 PROCEDURE — 97161 PT EVAL LOW COMPLEX 20 MIN: CPT

## 2023-06-20 PROCEDURE — 97140 MANUAL THERAPY 1/> REGIONS: CPT

## 2023-06-20 PROCEDURE — 97110 THERAPEUTIC EXERCISES: CPT

## 2023-06-20 NOTE — PLAN OF CARE
allow for proper joint functioning as indicated by Functional Deficits. [] Progressing: [] Met: [] Not Met: [] Adjusted  3. Patient will demonstrate an increase in NM recruitment/activation and overall GH and scapular strength to 4+/5 or greater, for proper functional mobility as indicated by patients Functional Deficits. [] Progressing: [] Met: [] Not Met: [] Adjusted  4. Patient will return to 75%  activities without increased symptoms or restriction. [] Progressing: [] Met: [] Not Met: [] Adjusted  5. Pt able to reach in cabinet to get dish out with minimal difficulty(patient specific functional goal)     [] Progressing: [] Met: [] Not Met: [] Adjusted      Electronically signed by:   Nichelle Mane, PT, MPT, OMT-c   #[id number]

## 2023-06-20 NOTE — FLOWSHEET NOTE
sense, posture, motor skill, proprioception and motor activation to allow for proper function  with self care and ADLs  [] (04153) Provided training and instruction to the patient for proper core and proximal hip recruitment and positioning with ambulation re-education     Home Exercise Program:    [x] (53713) Reviewed/Progressed HEP activities related to strengthening, flexibility, endurance, ROM of core, proximal hip and LE for functional self-care, mobility, lifting and ambulation   [] (29715) Reviewed/Progressed HEP activities related to improving balance, coordination, kinesthetic sense, posture, motor skill, proprioception of core, proximal hip and LE for self care, mobility, lifting, and ambulation      Manual Treatments:  PROM / STM / Oscillations-Mobs:  G-I, II, III, IV (PA's, Inf., Post.)  [] (43290) Provided manual therapy to mobilize proximal hip and LS spine soft tissue/joints for the purpose of modulating pain, promoting relaxation,  increasing ROM, reducing/eliminating soft tissue swelling/inflammation/restriction, improving soft tissue extensibility and allowing for proper ROM for normal function with self care, mobility, lifting and ambulation. []CRP:  Canalith Repositioning procedure for the assessment, treatment and education of BPPV    Modalities:       Charges:  Timed Code Treatment Minutes: 25   Total Treatment Minutes: 60     Medicare Cap total YTD:        [x]N/A  Workers Comp Time Stamp  (Per CPT and Total Treatment) [x]N/A   Time In:   Time Out:       [x] EVAL    [] Dry Needling  [x] FP(21443)   x  1   [] EStim Unattended 49976  [] NMR (39740)  x     [] Estim Attended  15155  [x] Manual (32304)  x  1    [] Mechanical Txn 00806  [] TA    x     [] Ultrasound  [] Gait   x  [] Vaso  [] CRP    [] Ionto           [] Other:        Electronically signed by:  Keo Gomez PT, [id number]      Note: If patient does not return for scheduled/ recommended follow up visits, this note will serve as a

## 2023-06-20 NOTE — PROGRESS NOTES
Spoke with patient and told her that [address] the OT Therapist was going to have to cancel todays appointment due to son being ill.  Patient will still come at 300 to see PT.

## 2023-06-22 ENCOUNTER — HOSPITAL ENCOUNTER (OUTPATIENT)
Dept: PHYSICAL THERAPY | Age: 68
Setting detail: THERAPIES SERIES
Discharge: HOME | End: 2023-06-22
Payer: COMMERCIAL

## 2023-06-22 NOTE — FLOWSHEET NOTE
Physical Therapy  Cancellation/No-show Note  Patient Name:  Sangeeta Watts  :  1955   Date:  2023  Cancels to Date: 1  No-shows to Date: 0    For today's appointment patient:  [x]  Cancelled  []  Rescheduled appointment  []  No-show     Reason given by patient:  []  Patient ill  [x]  Conflicting appointment  []  No transportation    []  Conflict with work  []  No reason given  []  Other:     Comments:  pt stated that she was sorry but she already had another appointment. Confirmed next appointment. Pt stated that she was sore from appointment on Tuesday and states pain 8/10. Instructed pt to ice for 10 minutes 1-3 times per day. Electronically signed by:   Ara Wilson, [address] [place] [address], [id number]

## 2023-06-27 ENCOUNTER — HOSPITAL ENCOUNTER (OUTPATIENT)
Dept: OCCUPATIONAL THERAPY | Age: 68
Setting detail: THERAPIES SERIES
Discharge: HOME | End: 2023-06-27
Payer: COMMERCIAL

## 2023-06-27 ENCOUNTER — HOSPITAL ENCOUNTER (OUTPATIENT)
Dept: PHYSICAL THERAPY | Age: 68
Setting detail: THERAPIES SERIES
Discharge: HOME | End: 2023-06-27
Payer: COMMERCIAL

## 2023-06-27 PROCEDURE — 97110 THERAPEUTIC EXERCISES: CPT

## 2023-06-27 PROCEDURE — 97140 MANUAL THERAPY 1/> REGIONS: CPT

## 2023-06-29 ENCOUNTER — HOSPITAL ENCOUNTER (OUTPATIENT)
Dept: PHYSICAL THERAPY | Age: 68
Setting detail: THERAPIES SERIES
Discharge: HOME | End: 2023-06-29
Payer: COMMERCIAL

## 2023-06-29 PROCEDURE — 97140 MANUAL THERAPY 1/> REGIONS: CPT

## 2023-06-29 PROCEDURE — 97110 THERAPEUTIC EXERCISES: CPT

## 2023-07-06 ENCOUNTER — HOSPITAL ENCOUNTER (OUTPATIENT)
Dept: PHYSICAL THERAPY | Age: 68
Setting detail: THERAPIES SERIES
Discharge: HOME | End: 2023-07-06
Payer: COMMERCIAL

## 2023-07-06 PROCEDURE — 97110 THERAPEUTIC EXERCISES: CPT

## 2023-07-06 PROCEDURE — 97140 MANUAL THERAPY 1/> REGIONS: CPT

## 2023-07-06 NOTE — FLOWSHEET NOTE
Physical Therapy Daily Treatment Note    [x]Daily Tx Note    []Progress Note    [] Discharge Summary         Date:  2023    Patient Name:  Emily Mccarty    :  1955  MRN: [id number]      Medical/Treatment Diagnosis Information:  D38.5, F23.184 aftercare following right shoulder joint replacement surgery G89.18 other acute postprocedural pain       Decrease AROM and strength of right                                              Insurance/Certification information: Medicare      Physician Information:   Milagros Montague MD     Plan of care sent to provider:      []Faxed   []Co-signature    (attempts: 1[x] 23 2 []3[])     Plan of care signed :  []  Yes  [x] No      Date of Patient follow up with Physician:     Is this a Progress Report:     []  Yes  [x]  No      If Yes:  Date Range for reporting period:  Beginning 2023  Ending    Progress report will be due (10 Rx or 30 days whichever is less):       Recertification will be due (POC Duration  / 90 days whichever is less): 2023      Visit # Insurance Allowable Auth Required     []  Yes [x]  No        Functional Scale:  SPADI    Date    2023  Score   122/130    Latex Allergy:  [x]NO      []YES  Preferred Language for Healthcare:   [x]English       []other:    RESTRICTIONS/PRECAUTIONS:      SUBJECTIVE: Pt reports that she does not feel that she is getting any better. Pain level: 8/10 all week; beginning 8/10 laying down 5/10; end pt states that she is 5/10 with extending arm back and 0/10 at rest.     Plan Moving Forward/ For next visit:   PROM   Manual techniques   Strengthening and stretching     OBJECTIVE: See eval    Exercises/Interventions:     Exercises in bold performed in department today. Items not bolded are carried forward from prior visits for continuity of the record.   Exercise/Activity Sets/ Reps/ Resistance    Comments/ Details HEP         Therapeutic Ex  Scifit recumbent    2 minutes, arms 4 seat 16

## 2023-07-11 ENCOUNTER — HOSPITAL ENCOUNTER (OUTPATIENT)
Dept: PHYSICAL THERAPY | Age: 68
Setting detail: THERAPIES SERIES
Discharge: HOME | End: 2023-07-11
Payer: COMMERCIAL

## 2023-07-11 ENCOUNTER — HOSPITAL ENCOUNTER (OUTPATIENT)
Dept: OCCUPATIONAL THERAPY | Age: 68
Setting detail: THERAPIES SERIES
Discharge: HOME | End: 2023-07-11
Payer: COMMERCIAL

## 2023-07-11 PROCEDURE — 97110 THERAPEUTIC EXERCISES: CPT

## 2023-07-11 PROCEDURE — 97140 MANUAL THERAPY 1/> REGIONS: CPT

## 2023-07-11 NOTE — FLOWSHEET NOTE
Physical Therapy Daily Treatment Note    [x]Daily Tx Note    [x]Progress Note    [] Discharge Summary         Date:  2023    Patient Name:  [place]    :  1955  MRN: [id number]      Medical/Treatment Diagnosis Information:  Z21.9, T59.728 aftercare following right shoulder joint replacement surgery G89.18 other acute postprocedural pain       Decrease AROM and strength of right                                              Insurance/Certification information: Medicare      Physician Information:   Ashu Swann MD     Plan of care sent to provider:      []Faxed   []Co-signature    (attempts: 1[x] 23 2 []3[])     Plan of care signed :  []  Yes  [x] No      Date of Patient follow up with Physician:     Is this a Progress Report:     [x]  Yes  []  No      If Yes:  Date Range for reporting period:  Beginning 2023  Ending 2023    Progress report will be due (10 Rx or 30 days whichever is less):  5773     Recertification will be due (POC Duration  / 90 days whichever is less): 2023      Visit # Insurance Allowable Auth Required     []  Yes [x]  No        Functional Scale:  SPADI    Date    2023  Score   122/130    Latex Allergy:  [x]NO      []YES  Preferred Language for Healthcare:   [x]English       []other:    RESTRICTIONS/PRECAUTIONS:      SUBJECTIVE: Pt reports that she was decent on Friday. Then increased pain on Saturday in bilateral shoulders and back and laying around. Pain level:  beginning 8/10 laying down 5/10; end pt states that she is 0/10 with extending arm back and 0/10 at rest.     Plan Moving Forward/ For next visit:   PROM   Manual techniques   Strengthening and stretching     OBJECTIVE: See eval    Exercises/Interventions:     Exercises in bold performed in department today. Items not bolded are carried forward from prior visits for continuity of the record.   Exercise/Activity Sets/ Reps/ Resistance    Comments/ Details HEP

## 2023-07-11 NOTE — PROGRESS NOTES
[facility]      Outpatient Physical Therapy     [x] Daily Treatment Note   [x] Progress Note   [] Discharge Note    Date:  7/11/2023    Patient Name:  So Hanna         YOB: 1955    Medical Diagnosis:   h/o breast cancer, swelling in L arm                                        ICD 10:  Z85.3, R22.32     Treatment Diagnosis: L UE Lymphedema                                          Onset Date:    11/21/22               Referral Date:  6/1/2023           Referring Physician: Luz Marrufo MD                                                    Visits Allowed/Insurance/Certification Information: Medicare        Precautions/ Contra-indications:   Latex Allergy:  [x]NO      []YES         Plan of care sent to provider:      [x]Faxed  []Co-signature    (attempts: 1[x] 2 []3[])         Plan of care signed:      []Yes date:            [x]No      Progress Note covers period from (if applicable):    [x]NA    []From          To           Next Progress Note due:   8/8/23    Visit# / total visits:  3/8    Plan for Next Session:     MLD  Exercise  Compression  elevation    Subjective:  Patient reports having more swelling in  UE today.    Pain level:   5/10 in R shoulder      Objective:   more swelling in L UE    GIRTH MEASUREMENT FLOW SHEET     Upper/ Extremity R L R L L  L   Date 11/15 11/15 1/3/2023 1/3/2023 6/8  7/11          6 Inches above bend of elbow                           27.8 27.2 28.3 29.2 28.6  27.5         3  Inches above bend of elbow 25 28.5 24.7 28.7 27  27           Bend of elbow 22.1 23 22.5 23.8 21.5  21.5           3 Inches below bend of elbow 21.9 21.7 21.8 21.4 20.5  21          6 Inches below bend of elbow 17.6 15.9 17.2 15.7 15.4  15.4   Wrist-Styloids                                   14 14 14.1 13.6 14.3 14.5    Distal Palmar Crease 17.7 14.4 16.1 14.6 13.9  14.4   Thumbs Proximal Phalanx 5.4 5 5.3 5 5  5.1   Base of 3rd digit 5 4.8 5 4.7 5 4.5

## 2023-07-13 ENCOUNTER — HOSPITAL ENCOUNTER (OUTPATIENT)
Dept: PHYSICAL THERAPY | Age: 68
Setting detail: THERAPIES SERIES
Discharge: HOME | End: 2023-07-13
Payer: COMMERCIAL

## 2023-07-13 PROCEDURE — 97140 MANUAL THERAPY 1/> REGIONS: CPT

## 2023-07-13 PROCEDURE — 97110 THERAPEUTIC EXERCISES: CPT

## 2023-07-13 NOTE — FLOWSHEET NOTE
Physical Therapy Daily Treatment Note    [x]Daily Tx Note    []Progress Note    [] Discharge Summary         Date:  2023    Patient Name:  Araceli Randhawa    :  1955  MRN: [id number]      Medical/Treatment Diagnosis Information:  M36.6, L62.149 aftercare following right shoulder joint replacement surgery G89.18 other acute postprocedural pain       Decrease AROM and strength of right                                              Insurance/Certification information: Medicare      Physician Information:   Vinnie Hoang MD     Plan of care sent to provider:      []Faxed   []Co-signature    (attempts: 1[x] 23 2 []3[])     Plan of care signed :  []  Yes  [x] No      Date of Patient follow up with Physician:     Is this a Progress Report:     []  Yes   [x]  No      If Yes:  Date Range for reporting period:  Beginning 2023  Ending 2023    Progress report will be due (10 Rx or 30 days whichever is less):     3/6/3725  Recertification will be due (POC Duration  / 90 days whichever is less): 2023      Visit # Insurance Allowable Auth Required     []  Yes [x]  No        Functional Scale:  SPADI    Date    2023  Score   122/130    Latex Allergy:  [x]NO      []YES  Preferred Language for Healthcare:   [x]English       []other:    RESTRICTIONS/PRECAUTIONS:      SUBJECTIVE: Pt reports that she is tired and that she has been been on the run all day. Pain level:  beginning 10; end of treatment 5/10 in anterior shoulder    Plan Moving Forward/ For next visit:   PROM   Manual techniques   Strengthening and stretching     OBJECTIVE: See eval    Exercises/Interventions:     Exercises in bold performed in department today. Items not bolded are carried forward from prior visits for continuity of the record.   Exercise/Activity Sets/ Reps/ Resistance    Comments/ Details HEP         Therapeutic Ex  Scifit recumbent    2 minutes, arms 4 seat 16    Held scifit      Shoulder shrugs and

## 2023-07-17 ENCOUNTER — ANTI-COAG VISIT (OUTPATIENT)
Dept: PHARMACY | Age: 68
End: 2023-07-17
Payer: COMMERCIAL

## 2023-07-17 DIAGNOSIS — I48.0 PAROXYSMAL ATRIAL FIBRILLATION (HCC): Primary | ICD-10-CM

## 2023-07-17 LAB — INTERNATIONAL NORMALIZATION RATIO, POC: 1.8

## 2023-07-17 PROCEDURE — 99211 OFF/OP EST MAY X REQ PHY/QHP: CPT | Performed by: INTERNAL MEDICINE

## 2023-07-17 PROCEDURE — 85610 PROTHROMBIN TIME: CPT | Performed by: INTERNAL MEDICINE

## 2023-07-17 NOTE — PROGRESS NOTES
Ms. Amy Hernandez is here for management of anticoagulation for Afib. PMH also significant for GERD, anxiety, depression, cardiomyopathy, hypothyroid, chronic back pain, hx of breast cancer, pacemaker placement, back surgery, and osteopenia. Pt verifies dosing regimen. Pt denies s/s bleeding/bruising/swelling/SOB. No changes in Rx/OTC/Herbal medications. Started on Jaurdince   Using Vegamour hair serum for [place] loss. She is on oxycodone for back pain. Pt is still having a couple servings of greens per week. Pt does not smoke and rarely drinks EtOH. Had shoulder surgery on 4/4/23. Patient says she is still in a lot of pain. Since last visit, pt started drinking daily Premier Protein shakes daily for additional nutrition. It has 30mcg (25% DV) of vit K in it. This is the reason her INR is lower, but she wishes to continue this, so we will increase her dose. INR 1.8 is below the therapeutic range of 2-3. Recommend to increase dose to take 3mg daily, except 4.5mg on Mon. Patient has warfarin 3 mg tablets. Will continue to monitor and check INR in 2 weeks. Dosing reminder card given with phone number, appointment date and time.   Return to clinic: 7/31 at 1300  Referring physician: Dr. Maricel Marion Only    Intervention Detail: Dose Adjustment: 1, reason: Interaction  Total # of Interventions Recommended: 1  Total # of Interventions Accepted: 1  Time Spent (min): 15

## 2023-07-18 ENCOUNTER — HOSPITAL ENCOUNTER (OUTPATIENT)
Dept: PHYSICAL THERAPY | Age: 68
Setting detail: THERAPIES SERIES
Discharge: HOME | End: 2023-07-18
Payer: COMMERCIAL

## 2023-07-18 PROCEDURE — 97110 THERAPEUTIC EXERCISES: CPT

## 2023-07-18 PROCEDURE — 97140 MANUAL THERAPY 1/> REGIONS: CPT

## 2023-07-18 NOTE — FLOWSHEET NOTE
Physical Therapy Daily Treatment Note    [x]Daily Tx Note    []Progress Note    [] Discharge Summary         Date:  2023    Patient Name:  Latasha Regan    :  1955  MRN: [id number]      Medical/Treatment Diagnosis Information:  U20.7, K88.719 aftercare following right shoulder joint replacement surgery G89.18 other acute postprocedural pain       Decrease AROM and strength of right                                              Insurance/Certification information: Medicare      Physician Information:   Sana Morris MD     Plan of care sent to provider:      [x]Faxed   []Co-signature    (attempts: 1[x] 23 2 []3[])     Plan of care signed :  [x]  Yes  [] No      Date of Patient follow up with Physician:     Is this a Progress Report:     []  Yes   [x]  No      If Yes:  Date Range for reporting period:  Beginning 2023  Ending 2023    Progress report will be due (10 Rx or 30 days whichever is less):     9022  Recertification will be due (POC Duration  / 90 days whichever is less): 2023      Visit # Insurance Allowable Auth Required     []  Yes [x]  No        Functional Scale:  SPADI    Date    2023  Score   122/130    Latex Allergy:  [x]NO      []YES  Preferred Language for Healthcare:   [x]English       []other:    RESTRICTIONS/PRECAUTIONS:      SUBJECTIVE: Pt reports that she is tired and shoulder hurts because everything hurts. Pain level:  beginning 10; end of treatment 4/10 in right shoulder    Plan Moving Forward/ For next visit:   PROM   Manual techniques   Strengthening and stretching     OBJECTIVE: See eval    Exercises/Interventions:     Exercises in bold performed in department today. Items not bolded are carried forward from prior visits for continuity of the record.   Exercise/Activity Sets/ Reps/ Resistance    Comments/ Details HEP   Scifit 2 minutes  LE and UE  1 minute  LE Pillow recumbent     Therapeutic Ex  Scifit recumbent    2 minutes, arms

## 2023-07-20 ENCOUNTER — HOSPITAL ENCOUNTER (OUTPATIENT)
Dept: PHYSICAL THERAPY | Age: 68
Setting detail: THERAPIES SERIES
Discharge: HOME | End: 2023-07-20
Payer: COMMERCIAL

## 2023-07-20 ENCOUNTER — HOSPITAL ENCOUNTER (OUTPATIENT)
Dept: OCCUPATIONAL THERAPY | Age: 68
Setting detail: THERAPIES SERIES
Discharge: HOME | End: 2023-07-20
Payer: COMMERCIAL

## 2023-07-20 PROCEDURE — 97140 MANUAL THERAPY 1/> REGIONS: CPT

## 2023-07-20 PROCEDURE — 97110 THERAPEUTIC EXERCISES: CPT

## 2023-07-20 NOTE — FLOWSHEET NOTE
care initiated [] Hold pending MD visit [] Discharge    Therapeutic Exercise and NMR EXR  [x] (36325) Provided verbal/tactile cueing for activities related to strengthening, flexibility, endurance, ROM  for improvements in proximal strength and core control with self care, mobility, lifting and ambulation.  [] (54409) Provided verbal/tactile cueing for activities related to improving balance, coordination, kinesthetic sense, posture, motor skill, proprioception  to assist with core control in self care, mobility, lifting, and ambulation. Therapeutic Activities and Gait:    [] (13195 or 63444) Provided verbal/tactile cueing for activities related to improving balance, coordination, kinesthetic sense, posture, motor skill, proprioception and motor activation to allow for proper function  with self care and ADLs  [] (22927) Provided training and instruction to the patient for proper core and proximal hip recruitment and positioning with ambulation re-education     Home Exercise Program:    [x] (52371) Reviewed/Progressed HEP activities related to strengthening, flexibility, endurance, ROM of core, proximal hip and LE for functional self-care, mobility, lifting and ambulation   [] (27630) Reviewed/Progressed HEP activities related to improving balance, coordination, kinesthetic sense, posture, motor skill, proprioception of core, proximal hip and LE for self care, mobility, lifting, and ambulation      Manual Treatments:  PROM / STM / Oscillations-Mobs:  G-I, II, III, IV (PA's, Inf., Post.)  [x] (48975) Provided manual therapy to mobilize proximal hip and LS spine soft tissue/joints for the purpose of modulating pain, promoting relaxation,  increasing ROM, reducing/eliminating soft tissue swelling/inflammation/restriction, improving soft tissue extensibility and allowing for proper ROM for normal function with self care, mobility, lifting and ambulation.      []CRP:  Canalith Repositioning procedure for the

## 2023-07-25 ENCOUNTER — HOSPITAL ENCOUNTER (OUTPATIENT)
Dept: OCCUPATIONAL THERAPY | Age: 68
Setting detail: THERAPIES SERIES
Discharge: HOME | End: 2023-07-25
Payer: COMMERCIAL

## 2023-07-25 ENCOUNTER — HOSPITAL ENCOUNTER (OUTPATIENT)
Dept: PHYSICAL THERAPY | Age: 68
Setting detail: THERAPIES SERIES
Discharge: HOME | End: 2023-07-25
Payer: COMMERCIAL

## 2023-07-25 PROCEDURE — 97110 THERAPEUTIC EXERCISES: CPT

## 2023-07-25 PROCEDURE — 97140 MANUAL THERAPY 1/> REGIONS: CPT

## 2023-07-25 PROCEDURE — 97112 NEUROMUSCULAR REEDUCATION: CPT

## 2023-07-25 NOTE — FLOWSHEET NOTE
[facility]      Outpatient Physical Therapy     [x] Daily Treatment Note   [] Progress Note   [] Discharge Note    Date:  7/25/2023    Patient Name:  Ying Barron         YOB: 1955    Medical Diagnosis:   h/o breast cancer, swelling in L arm                                        ICD 10:  Z85.3, R22.32     Treatment Diagnosis: L UE Lymphedema                                          Onset Date:    11/21/22               Referral Date:  6/1/2023           Referring Physician: Rizwana Rob MD                                                    Visits Allowed/Insurance/Certification Information: Medicare        Precautions/ Contra-indications:   Latex Allergy:  [x]NO      []YES         Plan of care sent to provider:      [x]Faxed  []Co-signature    (attempts: 1[x] 2 []3[])         Plan of care signed:      []Yes date:            [x]No      Progress Note covers period from (if applicable):    [x]NA    []From          To           Next Progress Note due:   8/8/23    Visit# / total visits:  5/8    Plan for Next Session:     MLD  Exercise  Compression  elevation    Subjective:  Patient reports no new concerns. Patient was not able to don compression today due to  unavailable this morning. Patient is trying to weight bear through L UE while standing at counters.      Pain level:   3/10 in L shoulder      Objective:   more swelling in L UE    GIRTH MEASUREMENT FLOW SHEET     Upper/ Extremity R L R L L  L   Date 11/15 11/15 1/3/2023 1/3/2023 6/8  7/11          6 Inches above bend of elbow                           27.8 27.2 28.3 29.2 28.6  27.5         3  Inches above bend of elbow 25 28.5 24.7 28.7 27  27           Bend of elbow 22.1 23 22.5 23.8 21.5  21.5           3 Inches below bend of elbow 21.9 21.7 21.8 21.4 20.5  21          6 Inches below bend of elbow 17.6 15.9 17.2 15.7 15.4  15.4   Wrist-Styloids                                   14 14 14.1 13.6 14.3 14.5    Distal

## 2023-07-25 NOTE — FLOWSHEET NOTE
Physical Therapy Daily Treatment Note    [x]Daily Tx Note    []Progress Note    [] Discharge Summary         Date:  2023    Patient Name:  Ame Santiago    :  1955  MRN: [id number]      Medical/Treatment Diagnosis Information:  G89.7, F88.909 aftercare following right shoulder joint replacement surgery G89.18 other acute postprocedural pain       Decrease AROM and strength of right                                              Insurance/Certification information: Medicare      Physician Information:   Jackson Kwong MD     Plan of care sent to provider:      [x]Faxed   []Co-signature    (attempts: 1[x] 23 2 []3[])     Plan of care signed :  [x]  Yes  [] No      Date of Patient follow up with Physician:     Is this a Progress Report:     []  Yes   [x]  No      If Yes:  Date Range for reporting period:  Beginning 2023  Ending 2023    Progress report will be due (10 Rx or 30 days whichever is less):     6375  Recertification will be due (POC Duration  / 90 days whichever is less): 2023      Visit # Insurance Allowable Auth Required     []  Yes [x]  No        Functional Scale:  SPADI    Date    2023  Score   122/130    Latex Allergy:  [x]NO      []YES  Preferred Language for Healthcare:   [x]English       []other:    RESTRICTIONS/PRECAUTIONS:      SUBJECTIVE: Pt reported no new complains. Pain level:  beginning R 5/10; end of treatment 4/10 in right shoulder     Plan Moving Forward/ For next visit:   PROM   Manual techniques   Strengthening and stretching     OBJECTIVE: See eval    Exercises/Interventions:     Exercises in bold performed in department today. Items not bolded are carried forward from prior visits for continuity of the record.   Exercise/Activity Sets/ Reps/ Resistance    Comments/ Details HEP   Scifit 3 minutes  LE and B UE  2 minute  LE and R UE seat  16, arms 4 subjective  Pillow recumbent     Therapeutic Ex   Held scifit      Shoulder shrugs and

## 2023-07-27 ENCOUNTER — HOSPITAL ENCOUNTER (OUTPATIENT)
Dept: PHYSICAL THERAPY | Age: 68
Setting detail: THERAPIES SERIES
Discharge: HOME | End: 2023-07-27
Payer: COMMERCIAL

## 2023-07-27 NOTE — FLOWSHEET NOTE
Physical Therapy  Cancellation/No-show Note  Patient Name:  Geneva Bradley  :  1955   Date:  2023  Cancels to Date: 2  No-shows to Date: 0    For today's appointment patient:  [x]  Cancelled  []  Rescheduled appointment  []  No-show     Reason given by patient:  []  Patient ill  []  Conflicting appointment  []  No transportation    []  Conflict with work  []  No reason given  [x]  Other:  family issue   Comments:     Electronically signed by:   Jelani Ahmadi, [address] [place] [address], [id number]

## 2023-07-27 NOTE — PROGRESS NOTES
PATIENT CALLED AND STATED THAT SHE HAS BEEN UP ALL NIGHT WITH SPOUSE IN THE HOSPITAL AND NEEDS TOP CANCEL TODAYS APPOINTMENT 7/27/23. cONFIRMED NEXT APPOINTMENT.

## 2023-07-31 ENCOUNTER — ANTI-COAG VISIT (OUTPATIENT)
Dept: PHARMACY | Age: 68
End: 2023-07-31
Payer: COMMERCIAL

## 2023-07-31 DIAGNOSIS — I48.0 PAROXYSMAL ATRIAL FIBRILLATION (HCC): Primary | ICD-10-CM

## 2023-07-31 LAB — INTERNATIONAL NORMALIZATION RATIO, POC: 2

## 2023-07-31 PROCEDURE — 99211 OFF/OP EST MAY X REQ PHY/QHP: CPT | Performed by: INTERNAL MEDICINE

## 2023-07-31 PROCEDURE — 85610 PROTHROMBIN TIME: CPT | Performed by: INTERNAL MEDICINE

## 2023-07-31 NOTE — PROGRESS NOTES
Ms. Alvina De La Cruz is here for management of anticoagulation for Afib. PMH also significant for GERD, anxiety, depression, cardiomyopathy, hypothyroid, chronic back pain, hx of breast cancer, pacemaker placement, back surgery, and osteopenia. Pt verifies dosing regimen. Pt denies s/s bleeding/bruising/swelling/SOB. No changes in Rx/OTC/Herbal medications. Using Vegamour hair serum for [place] loss. She is on oxycodone for back pain. Pt is still having a couple servings of greens per week. Still drinking daily Premier Protein shakes, has 30mcg (25% DV) of vit K in it  Pt does not smoke and rarely drinks EtOH. Had shoulder surgery on 23. Patient says she is still in a lot of pain. Under a lot of stress with her  in the hospital.    INR 2.0 is within the therapeutic range of 2-3. Recommend to continue 3mg daily, except 4.5mg on Mon. Patient has warfarin 3 mg tablets. Will continue to monitor and check INR in 2-3 weeks. Dosing reminder card given with phone number, appointment date and time.   Return to clinic:  at 1300  Referring physician: Dr. Yousif Angulo Only    Intervention Detail: Adherence Monitorin  Total # of Interventions Recommended: 1  Total # of Interventions Accepted: 1  Time Spent (min): 15

## 2023-08-01 ENCOUNTER — HOSPITAL ENCOUNTER (OUTPATIENT)
Dept: OCCUPATIONAL THERAPY | Age: 68
Setting detail: THERAPIES SERIES
Discharge: HOME | End: 2023-08-01
Payer: MEDICARE

## 2023-08-01 PROCEDURE — 97110 THERAPEUTIC EXERCISES: CPT

## 2023-08-01 PROCEDURE — 97140 MANUAL THERAPY 1/> REGIONS: CPT

## 2023-08-08 NOTE — PROGRESS NOTES
PATIENT LEFT A VOICEMAIL ON 8/8/2023 STATING THAT SHE IS HAVING A LUNG BIOPSY ON 8/10/2023 AND NEEDS TO CANCEL BOTH APPOINTMENTS THAT DAY, CONFIRMED NEXT APPOINTMENT.

## 2023-08-10 ENCOUNTER — APPOINTMENT (OUTPATIENT)
Dept: OCCUPATIONAL THERAPY | Age: 68
End: 2023-08-10
Payer: COMMERCIAL

## 2023-08-10 ENCOUNTER — HOSPITAL ENCOUNTER (OUTPATIENT)
Dept: PHYSICAL THERAPY | Age: 68
Setting detail: THERAPIES SERIES
Discharge: HOME | End: 2023-08-10
Payer: COMMERCIAL

## 2023-08-15 ENCOUNTER — HOSPITAL ENCOUNTER (OUTPATIENT)
Dept: PHYSICAL THERAPY | Age: 68
Setting detail: THERAPIES SERIES
Discharge: HOME | End: 2023-08-15
Payer: COMMERCIAL

## 2023-08-15 PROCEDURE — 97140 MANUAL THERAPY 1/> REGIONS: CPT

## 2023-08-15 PROCEDURE — 97110 THERAPEUTIC EXERCISES: CPT

## 2023-08-15 NOTE — PROGRESS NOTES
Physical Therapy Daily Treatment Note    [x]Daily Tx Note    [x]Progress Note    [] Discharge Summary         Date:  8/15/2023    Patient Name:  Geneva Bradley    :  1955  MRN: [id number]      Medical/Treatment Diagnosis Information:  C53.9, U84.647 aftercare following right shoulder joint replacement surgery G89.18 other acute postprocedural pain       Decrease AROM and strength of right                                              Insurance/Certification information: Medicare      Physician Information:   Krzysztof Harris MD     Plan of care sent to provider:      [x]Faxed   []Co-signature    (attempts: 1[x] 23 2 []3[])     Plan of care signed :  [x]  Yes  [] No      Date of Patient follow up with Physician:     Is this a Progress Report:     []  Yes   [x]  No      If Yes:  Date Range for reporting period:  Beginning 2023  Ending 8/15/2023    Progress report will be due (10 Rx or 30 days whichever is less):   4512  Recertification will be due (POC Duration  / 90 days whichever is less): 2023      Visit # Insurance Allowable Auth Required   10/16  []  Yes [x]  No        Functional Scale:  SPADI    Date    2023  Score   122/130    Latex Allergy:  [x]NO      []YES  Preferred Language for Healthcare:   [x]English       []other:    RESTRICTIONS/PRECAUTIONS:      SUBJECTIVE: Pt reports that right shoulder is killing her. Pt reports that she had biopsy of lung last Thursday and will find out results tomorrow. Pt reports that she is popping when getting dressed. Pt reports that she has had personal issues and has not performed exercise for 2 weeks. Pain level:  beginning R -7/10; end of treatment pt did not indicate pain level. Plan Moving Forward/ For next visit:   PROM   Manual techniques   Strengthening and stretching     OBJECTIVE: See eval    Exercises/Interventions:     Exercises in bold performed in department today.   Items not bolded are carried forward from prior

## 2023-08-17 ENCOUNTER — HOSPITAL ENCOUNTER (OUTPATIENT)
Dept: OCCUPATIONAL THERAPY | Age: 68
Setting detail: THERAPIES SERIES
Discharge: HOME | End: 2023-08-17
Payer: COMMERCIAL

## 2023-08-17 ENCOUNTER — HOSPITAL ENCOUNTER (OUTPATIENT)
Dept: PHYSICAL THERAPY | Age: 68
Setting detail: THERAPIES SERIES
Discharge: HOME | End: 2023-08-17
Payer: COMMERCIAL

## 2023-08-17 PROCEDURE — 97140 MANUAL THERAPY 1/> REGIONS: CPT

## 2023-08-17 PROCEDURE — 97112 NEUROMUSCULAR REEDUCATION: CPT

## 2023-08-17 PROCEDURE — 97110 THERAPEUTIC EXERCISES: CPT

## 2023-08-17 NOTE — FLOWSHEET NOTE
[facility]      Outpatient Occupational Therapy     [x] Daily Treatment Note   [] Progress Note   [] Discharge Note    Date:  8/17/2023    Patient Name:  Latasha Regan         YOB: 1955    Medical Diagnosis:   h/o breast cancer, swelling in L arm                                        ICD 10:  Z85.3, R22.32     Treatment Diagnosis: L UE Lymphedema                                          Onset Date:    11/21/22               Referral Date:  6/1/2023           Referring Physician: Lucien Shaffer MD                                                    Visits Allowed/Insurance/Certification Information: Medicare        Precautions/ Contra-indications:   Latex Allergy:  [x]NO      []YES         Plan of care sent to provider:      [x]Faxed  []Co-signature    (attempts: 1[x] 2 []3[])         Plan of care signed:      []Yes date:            [x]No      Progress Note covers period from (if applicable):    [x]NA    []From          To           Next Progress Note due:   8/8/23    Visit# / total visits:  7/8    Plan for Next Session:     MLD  Exercise  Compression  elevation    Subjective:  Patient reports  is in the hospital. Patient is also having a biopsy on her lung this week.    Pain level:   3/10 in L shoulder      Objective:   more swelling in L UE    GIRTH MEASUREMENT FLOW SHEET     Upper/ Extremity R L R L L  L   Date 11/15 11/15 1/3/2023 1/3/2023 6/8  7/11          6 Inches above bend of elbow                           27.8 27.2 28.3 29.2 28.6  27.5         3  Inches above bend of elbow 25 28.5 24.7 28.7 27  27           Bend of elbow 22.1 23 22.5 23.8 21.5  21.5           3 Inches below bend of elbow 21.9 21.7 21.8 21.4 20.5  21          6 Inches below bend of elbow 17.6 15.9 17.2 15.7 15.4  15.4   Wrist-Styloids                                   14 14 14.1 13.6 14.3 14.5    Distal Palmar Crease 17.7 14.4 16.1 14.6 13.9  14.4   Thumbs Proximal Phalanx 5.4 5 5.3 5 5  5.1   Base

## 2023-08-17 NOTE — FLOWSHEET NOTE
Physical Therapy Daily Treatment Note    [x]Daily Tx Note    []Progress Note    [] Discharge Summary         Date:  2023    Patient Name:  Gita Espinoza    :  1955  MRN: [id number]      Medical/Treatment Diagnosis Information:  M72.9, E19.460 aftercare following right shoulder joint replacement surgery G89.18 other acute postprocedural pain       Decrease AROM and strength of right                                              Insurance/Certification information: Medicare      Physician Information:   Robbie Lozano MD     Plan of care sent to provider:      [x]Faxed   []Co-signature    (attempts: 1[x] 23 2 []3[])     Plan of care signed :  [x]  Yes  [] No      Date of Patient follow up with Physician:     Is this a Progress Report:     []  Yes   [x]  No      If Yes:  Date Range for reporting period:  Beginning 2023  Ending 8/15/2023    Progress report will be due (10 Rx or 30 days whichever is less):   3/1/9002  Recertification will be due (POC Duration  / 90 days whichever is less): 2023      Visit # Insurance Allowable Auth Required     []  Yes [x]  No        Functional Scale:  SPADI    Date    2023  Score   122/130    Latex Allergy:  [x]NO      []YES  Preferred Language for Healthcare:   [x]English       []other:    RESTRICTIONS/PRECAUTIONS:      SUBJECTIVE: Pt reports that she is more careful with getting dressed. Pain level:  beginning R 5/10; end of treatment pt did not indicate pain level. Plan Moving Forward/ For next visit:   PROM   Manual techniques   Strengthening and stretching     OBJECTIVE: See eval    Exercises/Interventions:     Exercises in bold performed in department today. Items not bolded are carried forward from prior visits for continuity of the record.   Exercise/Activity Sets/ Reps/ Resistance    Comments/ Details HEP   Scifit 3 minutes  LE and B UE  2 minute  LE and R UE seat  16, arms 4 Pillow recumbent     Therapeutic Ex   Held

## 2023-08-21 ENCOUNTER — ANTI-COAG VISIT (OUTPATIENT)
Dept: PHARMACY | Age: 68
End: 2023-08-21
Payer: COMMERCIAL

## 2023-08-21 DIAGNOSIS — I48.0 PAROXYSMAL ATRIAL FIBRILLATION (HCC): Primary | ICD-10-CM

## 2023-08-21 LAB — INTERNATIONAL NORMALIZATION RATIO, POC: 1.2

## 2023-08-21 PROCEDURE — 85610 PROTHROMBIN TIME: CPT | Performed by: INTERNAL MEDICINE

## 2023-08-21 PROCEDURE — 99211 OFF/OP EST MAY X REQ PHY/QHP: CPT | Performed by: INTERNAL MEDICINE

## 2023-08-21 NOTE — PROGRESS NOTES
Ms. Brendan Greenfield is here for management of anticoagulation for Afib. PMH also significant for GERD, anxiety, depression, cardiomyopathy, hypothyroid, chronic back pain, hx of breast cancer, pacemaker placement, back surgery, and osteopenia. Pt verifies dosing regimen. Pt denies s/s bleeding/bruising/swelling/SOB. No changes in Rx/OTC/Herbal medications. Using Vegamour hair serum for [place] loss. She is on oxycodone for back pain. Pt is still having a couple servings of greens per week. Still drinking daily Premier Protein shakes, has 30mcg (25% DV) of vit K in it  Pt does not smoke and rarely drinks EtOH. Pt was off of warfarin for awhile due to various testing and a lung biopsy that did show cancer. Stopped warfarin on 8/1, restarted warfarin 8/10- but her INR has not responded. She is meeting with two different oncologists on 8/22 and 8/29 to get more answers and a treatment plan. INR 1.2 is below the therapeutic range of 2-3. Recommend to take 6mg tonight, then increase dose to 3mg daily, except 4.5mg on Mon/Fri. Patient has warfarin 3 mg tablets. Will continue to monitor and check INR in 1 week. Dosing reminder card given with phone number, appointment date and time.   Return to clinic: 8/28 at 1130 (coming after an appt)  Referring physician: Dr. Burgess Grace Only    Intervention Detail: Dose Adjustment: 1, reason: Therapy Optimization  Total # of Interventions Recommended: 1  Total # of Interventions Accepted: 1  Time Spent (min): 15

## 2023-08-24 ENCOUNTER — HOSPITAL ENCOUNTER (OUTPATIENT)
Dept: OCCUPATIONAL THERAPY | Age: 68
Setting detail: THERAPIES SERIES
Discharge: HOME | End: 2023-08-24
Payer: COMMERCIAL

## 2023-08-24 ENCOUNTER — HOSPITAL ENCOUNTER (OUTPATIENT)
Dept: PHYSICAL THERAPY | Age: 68
Setting detail: THERAPIES SERIES
Discharge: HOME | End: 2023-08-24
Payer: COMMERCIAL

## 2023-08-24 NOTE — PROGRESS NOTES
Treatment Minutes: 6   Total Treatment Minutes: 11     Medicare Cap total YTD:     5030   []N/A no charge today secondary to pt was not able to stay for session due to trying to set up appointments needed for cancer treatment which she found out about this week. Pt seen for less than 8 minutes. Workers Comp Time Stamp  (Per CPT and Total Treatment) [x]N/A   Time In:   Time Out:       [] EVAL    [] Dry Needling  [] DI(51284)   x    [] EStim Unattended 27699  [] NMR (01914)  x     [] Estim Attended  74653  [] Manual (77081)  x      [] Mechanical Txn 74552  [] TA    x     [] Ultrasound  [] Gait   x  [] Vaso  [] CRP    [] Ionto           [] Other:        Electronically signed by: Desean Vargas PT, [id number]      Note: If patient does not return for scheduled/ recommended follow up visits, this note will serve as a discharge from care along with most recent update on progress.

## 2023-08-24 NOTE — FLOWSHEET NOTE
Occupational Therapy    Patient Name:  Clifton Chan  :  1955   Date:  2023  Cancels to Date: 1    Patient arrived to therapy clinic today. Patient received her lung biopsy results yesterday. Patient's diagnosis is Primary malignant neoplasm of bronchus of right lower lobe. Patient receiving multiple phone calls to setup appointments for cancer testing and treatment. Patient requesting to cancel treatment today and to put OT lymphedema therapy on hold at this time.      LLIS score: 50/72    Electronically signed by:  Alexi Renteria OT

## 2023-08-28 ENCOUNTER — ANTI-COAG VISIT (OUTPATIENT)
Dept: PHARMACY | Age: 68
End: 2023-08-28
Payer: COMMERCIAL

## 2023-08-28 DIAGNOSIS — I48.0 PAROXYSMAL ATRIAL FIBRILLATION (HCC): Primary | ICD-10-CM

## 2023-08-28 LAB — INR BLD: 1.8

## 2023-08-28 PROCEDURE — 85610 PROTHROMBIN TIME: CPT | Performed by: FAMILY MEDICINE

## 2023-08-28 PROCEDURE — 99211 OFF/OP EST MAY X REQ PHY/QHP: CPT | Performed by: FAMILY MEDICINE

## 2023-08-28 NOTE — PROGRESS NOTES
Ms. Evi Mckee is here for management of anticoagulation for Afib. PMH also significant for GERD, anxiety, depression, cardiomyopathy, hypothyroid, chronic back pain, hx of breast cancer, pacemaker placement, back surgery, and osteopenia. Pt verifies dosing regimen. Pt denies s/s bleeding/bruising/swelling/SOB. No changes in Rx/OTC/Herbal medications. Using Vegamour hair serum for [place] loss. She is on oxycodone for back pain. Pt is still having a couple servings of greens per week. Still drinking daily Premier Protein shakes, has 30mcg (25% DV) of vit K in it  Pt does not smoke and rarely drinks EtOH. Pt reports no changes    INR 1.8 is below the therapeutic range of 2-3. Recommend to take 6mg tonight, then continue dose of 3mg daily, except 4.5mg on Mon/Fri. Patient has warfarin 3 mg tablets. Will continue to monitor and check INR in 1 week. Dosing reminder card given with phone number, appointment date and time.   Return to clinic:  at 1130 (coming after an appt)  Referring physician: Dr. Terry Hanna Only    Intervention Detail: Adherence Monitorin  Total # of Interventions Recommended: 1  Total # of Interventions Accepted: 1  Time Spent (min): 15    I have seen the patient and I agree with the assessment and plan created by the PharmD Candidate  Colt Nick PharmD 2023 1:23 PM

## 2023-08-29 ENCOUNTER — APPOINTMENT (OUTPATIENT)
Dept: PHYSICAL THERAPY | Age: 68
End: 2023-08-29
Payer: COMMERCIAL

## 2023-08-31 ENCOUNTER — APPOINTMENT (OUTPATIENT)
Dept: PHYSICAL THERAPY | Age: 68
End: 2023-08-31
Payer: COMMERCIAL

## 2023-08-31 ENCOUNTER — APPOINTMENT (OUTPATIENT)
Dept: OCCUPATIONAL THERAPY | Age: 68
End: 2023-08-31
Payer: COMMERCIAL

## 2023-09-05 ENCOUNTER — APPOINTMENT (OUTPATIENT)
Dept: CT IMAGING | Age: 68
End: 2023-09-05
Payer: COMMERCIAL

## 2023-09-05 ENCOUNTER — HOSPITAL ENCOUNTER (EMERGENCY)
Age: 68
Discharge: HOME | End: 2023-09-05
Payer: COMMERCIAL

## 2023-09-05 VITALS
DIASTOLIC BLOOD PRESSURE: 51 MMHG | RESPIRATION RATE: 16 BRPM | OXYGEN SATURATION: 97 % | TEMPERATURE: 98.9 F | HEART RATE: 60 BPM | HEIGHT: 63 IN | SYSTOLIC BLOOD PRESSURE: 85 MMHG | BODY MASS INDEX: 23.21 KG/M2 | WEIGHT: 131 LBS

## 2023-09-05 DIAGNOSIS — S09.90XA INJURY OF HEAD, INITIAL ENCOUNTER: ICD-10-CM

## 2023-09-05 DIAGNOSIS — Z79.01 ANTICOAGULATED ON COUMADIN: ICD-10-CM
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
## 2023-09-05 DIAGNOSIS — I95.9 HYPOTENSION, UNSPECIFIED HYPOTENSION TYPE: ICD-10-CM

## 2023-09-05 PROCEDURE — 70450 CT HEAD/BRAIN W/O DYE: CPT

## 2023-09-05 PROCEDURE — 72125 CT NECK SPINE W/O DYE: CPT

## 2023-09-05 PROCEDURE — 6370000000 HC RX 637 (ALT 250 FOR IP): Performed by: PHYSICIAN ASSISTANT

## 2023-09-05 PROCEDURE — 99284 EMERGENCY DEPT VISIT MOD MDM: CPT

## 2023-09-05 RX ORDER — ACETAMINOPHEN 500 MG
1000 TABLET ORAL ONCE
Status: COMPLETED | OUTPATIENT
Start: 2023-09-05 | End: 2023-09-05

## 2023-09-05 RX ADMIN — ACETAMINOPHEN 1000 MG: 500 TABLET ORAL at 12:24

## 2023-09-05 ASSESSMENT — PAIN SCALES - GENERAL
PAINLEVEL_OUTOF10: 6

## 2023-09-05 ASSESSMENT — PAIN - FUNCTIONAL ASSESSMENT
PAIN_FUNCTIONAL_ASSESSMENT: 0-10
PAIN_FUNCTIONAL_ASSESSMENT: 0-10

## 2023-09-05 ASSESSMENT — PAIN DESCRIPTION - LOCATION: LOCATION: HEAD

## 2023-09-05 NOTE — ED PROVIDER NOTES
rhinitis     Anxiety     Atrial fibrillation ([address])     B12 deficiency     Cancer [facility])     breast cancer    CARDIAC TAMPONADE 06/2014    following insertion ICD/defibrillator - window placed to remove fluid - 7/2014    Cardiomyopathy 01/20/2011    Dr. Ramirez [facility]  EF=37%. MUGA scan 2/11 - shows Normal EF=55% 4/2012 - shows EF of 25% with severe global hypokinesis    Cataracts     bilateral    CHF (congestive heart failure) (HCC)     Chronic back pain     Dr. Fredy Ling - he gives her percocet - monitors. DDD (degenerative disc disease), lumbosacral 2013    multi - level     Depression     DJD (degenerative joint disease), cervical     bone spurs of neck and spine    GERD (gastroesophageal reflux disease)     H/O cold sores     HX: breast cancer 1992    left - spread to lymph nodes and Tx with left mastectomy - modified radical and chemo tx x 3 agents and radiation and bone marrow transplant.     Hypothyroid 01/21/2014    ICD (implantable cardiac defibrillator) in place 06/10/2014    Lung cancer ([address])     Neuropathy     left arm and hand from chemotherapy    Osteopenia 04/23/2007    -1.3 in spine and -1.5 in hip    Pneumonia 01/2014    S/P cardiac cath 04/2004    arteries ok - EF - 30% see report, Dr. Ibrahima Connor:      Past Surgical History:   Procedure Laterality Date    BACK SURGERY  06/19/2007    BONE MARROW BIOPSY N/A     BONE MARROW TRANSPLANT N/A 01/01/1993    BREAST RECONSTRUCTION  01/01/1998    CARDIAC SURGERY  07/01/2014    had window placed to drain pericardial effusion/tamponade    CARPAL TUNNEL RELEASE  01/01/2000    right    CATARACT REMOVAL  01/01/1996    bilateral    CERVICAL SPINE SURGERY  06/01/2009    Dr. Shellie Badillo  01/01/2008    lap    COLONOSCOPY  12/29/2010    HAND SURGERY  10/01/2011    tendon transfer to thumb on left hand    HYSTERECTOMY (CERVIX STATUS UNKNOWN)  01/01/1984    total    MASTECTOMY, MODIFIED RADICAL  01/01/1993    left    SHOULDER SURGERY Right 04/04/2023

## 2023-09-05 NOTE — ED NOTES
Pt's pressure 83/41. States that her pressures are normally low, and does not feel hypotensive symptoms.      Lu Yi RN  09/05/23 [id number]

## 2023-09-06 ENCOUNTER — ANTI-COAG VISIT (OUTPATIENT)
Dept: PHARMACY | Age: 68
End: 2023-09-06
Payer: COMMERCIAL

## 2023-09-06 ENCOUNTER — TELEPHONE (OUTPATIENT)
Dept: INTERVENTIONAL RADIOLOGY/VASCULAR | Age: 68
End: 2023-09-06

## 2023-09-06 DIAGNOSIS — I48.0 PAROXYSMAL ATRIAL FIBRILLATION (HCC): Primary | ICD-10-CM

## 2023-09-06 LAB — INR BLD: 2.4

## 2023-09-06 PROCEDURE — 99211 OFF/OP EST MAY X REQ PHY/QHP: CPT | Performed by: HOME HEALTH

## 2023-09-06 PROCEDURE — 85610 PROTHROMBIN TIME: CPT | Performed by: HOME HEALTH

## 2023-09-06 NOTE — TELEPHONE ENCOUNTER
Called and spoke to [facility] about upcoming procedure. Phone number used: [phone]  Procedure: bone biopsy  Approving Radiologist: Sneha White    Pt informed of the following:  Date of procedure: 9/13  Arrival time of procedure: 1030  Time of procedure: 0830    On any blood thinners? Yes. If yes: Coumadin  Instructed to hold thinners for 5 days prior to test    Blood pressure medication? Yes. If yes need to make sure take morning of procedure. Any issues with sedation in the past? NO  Will receive versed and fentanyl for sedation will need a  day of procedure. H&P or office note within 30 days? yes -     After procedure will be here 2-4 hours after procedure for monitoring. Nothing to eat or drink at midnight. Need COVID testing prior: No    Need SDS: Yes    PT STATES THAT SHE NEEDS A MIDLINE FOR PROCEDURE.  LET [facility] KNOW THAT WE NEED AN ORDER FOR MIDLINE

## 2023-09-06 NOTE — PROGRESS NOTES
Ms. Erica Gonzalez is here for management of anticoagulation for Afib. PMH also significant for newly diagnosed lung CA , GERD, anxiety, depression, cardiomyopathy, hypothyroid, chronic back pain, hx of breast cancer, pacemaker placement, back surgery, and osteopenia. Pt verifies dosing regimen. Pt denies s/s bleeding/bruising/swelling/SOB. No changes in Rx/OTC/Herbal medications. Using Vegamour hair serum for [place] loss. She is on oxycodone for back pain. Pt is still having a couple servings of greens per week. Still drinking daily Premier Protein shakes, has 30mcg (25% DV) of vit K in it  Pt does not smoke and rarely drinks EtOH. Pt reports she is taking Morphine BID, and oxycodone now for pain. She will be starting Chemo soon for Lung Ca    INR 2.4 is within the therapeutic range of 2-3. Recommend to continue dose of 3mg daily, except 4.5mg on Mon/Fri. Patient has warfarin 3 mg tablets. Will continue to monitor and check INR in 4 weeks. Dosing reminder card given with phone number, appointment date and time.   Return to clinic: 10/2 at 36   Referring physician: Dr. Kobi Vann Only    Intervention Detail:   Total # of Interventions Recommended: 0  Total # of Interventions Accepted: 0  Time Spent (min): 15

## 2023-09-13 ENCOUNTER — HOSPITAL ENCOUNTER (OUTPATIENT)
Dept: MRI IMAGING | Age: 68
Discharge: HOME | End: 2023-09-13
Attending: INTERNAL MEDICINE
Payer: COMMERCIAL

## 2023-09-13 ENCOUNTER — HOSPITAL ENCOUNTER (OUTPATIENT)
Age: 68
Discharge: HOME | End: 2023-09-13
Payer: COMMERCIAL

## 2023-09-13 ENCOUNTER — HOSPITAL ENCOUNTER (OUTPATIENT)
Dept: INTERVENTIONAL RADIOLOGY/VASCULAR | Age: 68
Discharge: HOME | End: 2023-09-13
Payer: COMMERCIAL

## 2023-09-13 DIAGNOSIS — C34.31 SQUAMOUS CELL CARCINOMA OF BRONCHUS IN RIGHT LOWER LOBE (HCC): ICD-10-CM

## 2023-09-13 DIAGNOSIS — Z79.2 RECEIVING INTRAVENOUS ANTIBIOTIC TREATMENT AT HOME: ICD-10-CM

## 2023-09-13 LAB
BASOPHILS # BLD: 0.1 K/UL (ref 0–0.2)
BASOPHILS NFR BLD: 1 %
CREAT SERPL-MCNC: 0.9 MG/DL (ref 0.6–1.2)
DEPRECATED RDW RBC AUTO: 14.8 % (ref 12.4–15.4)
EOSINOPHIL # BLD: 0.2 K/UL (ref 0–0.6)
EOSINOPHIL NFR BLD: 3.9 %
GFR SERPLBLD CREATININE-BSD FMLA CKD-EPI: >60 ML/MIN/{1.73_M2}
HCT VFR BLD AUTO: 36.3 % (ref 36–48)
HGB BLD-MCNC: 12.6 G/DL (ref 12–16)
INR PPP: 1.18 (ref 0.84–1.16)
LYMPHOCYTES # BLD: 0.5 K/UL (ref 1–5.1)
LYMPHOCYTES NFR BLD: 8.6 %
MCH RBC QN AUTO: 33.3 PG (ref 26–34)
MCHC RBC AUTO-ENTMCNC: 34.8 G/DL (ref 31–36)
MCV RBC AUTO: 95.7 FL (ref 80–100)
MONOCYTES # BLD: 0.5 K/UL (ref 0–1.3)
MONOCYTES NFR BLD: 9 %
NEUTROPHILS # BLD: 4.4 K/UL (ref 1.7–7.7)
NEUTROPHILS NFR BLD: 77.5 %
PLATELET # BLD AUTO: 159 K/UL (ref 135–450)
PMV BLD AUTO: 6.5 FL (ref 5–10.5)
PROTHROMBIN TIME: 15 SEC (ref 11.5–14.8)
RBC # BLD AUTO: 3.79 M/UL (ref 4–5.2)
WBC # BLD AUTO: 5.7 K/UL (ref 4–11)

## 2023-09-13 PROCEDURE — 6360000004 HC RX CONTRAST MEDICATION: Performed by: INTERNAL MEDICINE

## 2023-09-13 PROCEDURE — 36415 COLL VENOUS BLD VENIPUNCTURE: CPT

## 2023-09-13 PROCEDURE — 76937 US GUIDE VASCULAR ACCESS: CPT

## 2023-09-13 PROCEDURE — 85610 PROTHROMBIN TIME: CPT

## 2023-09-13 PROCEDURE — 73723 MRI JOINT LWR EXTR W/O&W/DYE: CPT

## 2023-09-13 PROCEDURE — A9579 GAD-BASE MR CONTRAST NOS,1ML: HCPCS | Performed by: INTERNAL MEDICINE

## 2023-09-13 PROCEDURE — C1751 CATH, INF, PER/CENT/MIDLINE: HCPCS

## 2023-09-13 PROCEDURE — 85025 COMPLETE CBC W/AUTO DIFF WBC: CPT

## 2023-09-13 PROCEDURE — 36410 VNPNXR 3YR/> PHY/QHP DX/THER: CPT

## 2023-09-13 PROCEDURE — 82565 ASSAY OF CREATININE: CPT

## 2023-09-13 RX ADMIN — GADOTERIDOL 11 ML: 279.3 INJECTION, SOLUTION INTRAVENOUS at 12:16

## 2023-09-13 NOTE — PROGRESS NOTES
Midline insertion Procedure Note  Андрей Tatum   Admitted- 9/13/2023 10:18 AM  Admission diagnosis- Receiving intravenous antibiotic treatment at home [Z79.2]      Attending Physician- No att. providers found  Ordering Physician- Kimberly Coates  Indication for Insertion: Limited Access    Lab Results   Component Value Date    INR 2.4 09/06/2023    PROTIME 26.5 (H) 09/26/2015     Lab Results   Component Value Date    WBC 5.7 09/13/2023    HGB 12.6 09/13/2023    HCT 36.3 09/13/2023     09/13/2023     Lab Results   Component Value Date    CREATININE 0.7 05/21/2016    BUN 14 05/21/2016    GFR >60 05/14/2013       Catheter Insertion Date- 9/13/2023   Catheter Brand- BARD Provena Midline Cathter  Lot Number- [id number]  Lumen-Single  Expiration date- 7/31/2024    Insertion Site- Right Basilic  Vein Diameter- 3.56 cm  [place] Size-  3  Catheter Length- 13 cm  Exposed Catheter Length- 0cm   Easy insertion- Yes  Able to Aspirate Blood- Yes  Easy Flush- Yes    Midline insertion successful- Yes  Ultrasound- yes    Okay To Use Midline- \"Yes    Electronically signed by Ivana Erazo, RN, RN on 9/13/2023 at 11:20 AM

## 2023-09-15 ENCOUNTER — HOSPITAL ENCOUNTER (OUTPATIENT)
Dept: MRI IMAGING | Age: 68
Discharge: HOME | End: 2023-09-15
Attending: RADIOLOGY
Payer: COMMERCIAL

## 2023-09-15 ENCOUNTER — HOSPITAL ENCOUNTER (OUTPATIENT)
Dept: CT IMAGING | Age: 68
Discharge: HOME | End: 2023-09-15
Attending: INTERNAL MEDICINE
Payer: COMMERCIAL

## 2023-09-15 VITALS
BODY MASS INDEX: 23.21 KG/M2 | TEMPERATURE: 96.9 F | OXYGEN SATURATION: 93 % | WEIGHT: 131 LBS | RESPIRATION RATE: 14 BRPM | HEART RATE: 57 BPM | SYSTOLIC BLOOD PRESSURE: 92 MMHG | DIASTOLIC BLOOD PRESSURE: 43 MMHG | HEIGHT: 63 IN

## 2023-09-15 DIAGNOSIS — C34.31 SQUAMOUS CELL CARCINOMA OF BRONCHUS IN RIGHT LOWER LOBE (HCC): ICD-10-CM

## 2023-09-15 DIAGNOSIS — C34.92 NON-SMALL CELL CANCER OF LEFT LUNG (HCC): ICD-10-CM

## 2023-09-15 DIAGNOSIS — C34.90 NON-SMALL CELL LUNG CANCER, UNSPECIFIED LATERALITY (HCC): ICD-10-CM

## 2023-09-15 PROCEDURE — 20225 BONE BIOPSY TROCAR/NDL DEEP: CPT

## 2023-09-15 PROCEDURE — 6360000004 HC RX CONTRAST MEDICATION: Performed by: RADIOLOGY

## 2023-09-15 PROCEDURE — 7100000011 HC PHASE II RECOVERY - ADDTL 15 MIN

## 2023-09-15 PROCEDURE — 70553 MRI BRAIN STEM W/O & W/DYE: CPT

## 2023-09-15 PROCEDURE — A9579 GAD-BASE MR CONTRAST NOS,1ML: HCPCS | Performed by: RADIOLOGY

## 2023-09-15 PROCEDURE — 6360000002 HC RX W HCPCS: Performed by: RADIOLOGY

## 2023-09-15 PROCEDURE — 7100000010 HC PHASE II RECOVERY - FIRST 15 MIN

## 2023-09-15 PROCEDURE — 77012 CT SCAN FOR NEEDLE BIOPSY: CPT

## 2023-09-15 RX ORDER — FENTANYL CITRATE 50 UG/ML
INJECTION, SOLUTION INTRAMUSCULAR; INTRAVENOUS PRN
Status: COMPLETED | OUTPATIENT
Start: 2023-09-15 | End: 2023-09-15

## 2023-09-15 RX ORDER — DIPHENHYDRAMINE HYDROCHLORIDE 50 MG/ML
INJECTION INTRAMUSCULAR; INTRAVENOUS PRN
Status: COMPLETED | OUTPATIENT
Start: 2023-09-15 | End: 2023-09-15

## 2023-09-15 RX ORDER — OXYCODONE HYDROCHLORIDE AND ACETAMINOPHEN 5; 325 MG/1; MG/1
1 TABLET ORAL ONCE
Status: DISCONTINUED | OUTPATIENT
Start: 2023-09-15 | End: 2023-09-16 | Stop reason: HOSPADM

## 2023-09-15 RX ADMIN — FENTANYL CITRATE 25 MCG: 50 INJECTION, SOLUTION INTRAMUSCULAR; INTRAVENOUS at 12:35

## 2023-09-15 RX ADMIN — FENTANYL CITRATE 75 MCG: 50 INJECTION, SOLUTION INTRAMUSCULAR; INTRAVENOUS at 12:39

## 2023-09-15 RX ADMIN — DIPHENHYDRAMINE HYDROCHLORIDE 25 MG: 50 INJECTION, SOLUTION INTRAMUSCULAR; INTRAVENOUS at 12:31

## 2023-09-15 RX ADMIN — DIPHENHYDRAMINE HYDROCHLORIDE 25 MG: 50 INJECTION, SOLUTION INTRAMUSCULAR; INTRAVENOUS at 12:42

## 2023-09-15 RX ADMIN — GADOTERIDOL 12 ML: 279.3 INJECTION, SOLUTION INTRAVENOUS at 11:20

## 2023-09-15 ASSESSMENT — PAIN - FUNCTIONAL ASSESSMENT: PAIN_FUNCTIONAL_ASSESSMENT: 0-10

## 2023-09-15 NOTE — PROGRESS NOTES
Patient arrived to [address] [place] [address], phase two initiated. Placed on bedside monitor, VSS. [facility] [address]. Patient on room air. Assessment WNL. Warm blankets applied. Side rails in place, call light within reach, will monitor patient closely.

## 2023-09-15 NOTE — PROGRESS NOTES
Patient admitted to [facility] 5 in preparation for procedure, VSS. Consent per IR. Midline in place, patient had labs drawn on 9/13, NS locked. Belongings in locker 5. NPO since 1900.

## 2023-09-15 NOTE — OR NURSING
Image guided bone lesion biopsy biopsy completed by Dr. Homa Taylor. Pt tolerated procedure without any signs or symptoms of distress. Vital signs stable. Report given  to RN. Pt transported back to [facility] in stable condition via stretcher. Total Biopsy: multiple cores  Received: Versed: 0 mg       Fentanyl: 100 mcg Benadryl: 50mg  Bandage to L hip that is clean dry and intact.        Vital Signs  Vitals:    09/15/23 1322   BP: 108/61   Pulse: 62   Resp: 15   Temp:    SpO2: 95%    (vital signs in table format)    Post Shankar  2 - Able to move 4 extremities voluntarily on command  2 - BP+/- 20mmHg of normal  2 - Fully awake  2 - Able to maintain oxygen saturation >92% on room air  2 - Able to breathe deeply and cough freely

## 2023-09-15 NOTE — OR NURSING
Pt arrived for image guided bone lesion biopsy left. Procedure explained including the risk and benefits of the procedure. All questions answered. Pt verbalizes understanding of the of procedure and states no more questions. Consent signed. Vital signs stable, labs, allergies, medications, and code status reviewed. No contraindications noted.      Vitals:    09/15/23 1229   BP: (!) 98/54   Pulse: 65   Resp: 16   Temp:    SpO2: 97%    (vital signs in table format)    Shankar Score  2 - Able to move 4 extremities voluntarily on command  2 - BP+/- 20mmHg of normal  2 - Fully awake  2 - Able to maintain oxygen saturation >92% on room air  2 - Able to breathe deeply and cough freely    Allergies  Hydromorphone, Codeine, Levofloxacin, Methocarbamol, Morphine, Morphine and related, Other, Sulfa antibiotics, and Tape [adhesive tape]    Labs  Lab Results   Component Value Date    INR 1.18 (H) 09/13/2023    PROTIME 15.0 (H) 09/13/2023       Lab Results   Component Value Date    CREATININE 0.9 09/13/2023    BUN 14 05/21/2016    GFR >60 05/14/2013     05/21/2016    K 3.9 05/21/2016    CL 98 (L) 05/21/2016    CO2 28 05/21/2016       Lab Results   Component Value Date    WBC 5.7 09/13/2023    HGB 12.6 09/13/2023    HCT 36.3 09/13/2023    MCV 95.7 09/13/2023     09/13/2023

## 2023-10-02 ENCOUNTER — ANTI-COAG VISIT (OUTPATIENT)
Dept: PHARMACY | Age: 68
End: 2023-10-02
Payer: COMMERCIAL

## 2023-10-02 DIAGNOSIS — I48.0 PAROXYSMAL ATRIAL FIBRILLATION (HCC): Primary | ICD-10-CM

## 2023-10-02 LAB — INTERNATIONAL NORMALIZATION RATIO, POC: 1.8

## 2023-10-02 PROCEDURE — 85610 PROTHROMBIN TIME: CPT | Performed by: INTERNAL MEDICINE

## 2023-10-02 PROCEDURE — 99211 OFF/OP EST MAY X REQ PHY/QHP: CPT | Performed by: INTERNAL MEDICINE

## 2023-10-02 NOTE — PROGRESS NOTES
Ms. Regan Sheffield is here for management of anticoagulation for Afib. PMH also significant for newly diagnosed lung CA , GERD, anxiety, depression, cardiomyopathy, hypothyroid, chronic back pain, hx of breast cancer, pacemaker placement, back surgery, and osteopenia. Pt verifies dosing regimen. Pt denies s/s bleeding/bruising/swelling/SOB. No changes in Rx/OTC/Herbal medications. Using Vegamour hair serum for [place] loss. She is on oxycodone for back pain. Pt is still having a couple servings of greens per week. Still drinking daily Premier Protein shakes, has 30mcg (25% DV) of vit K in it  Pt does not smoke and rarely drinks EtOH. Pt reports she is taking Morphine BID, and oxycodone now for pain. She will be starting Chemo soon for Lung Ca. She was just diagnosed with a carcinoma in her hip, so they are starting radiation soon. Had her holding warfarin for a bone biopsy, restarted about 2 weeks ago. INR 1.8 is slightly below the therapeutic range of 2-3. Recommend to continue dose of 3mg daily, except 4.5mg on Mon/Fri. Patient has warfarin 3 mg tablets. Will continue to monitor and check INR in 2 weeks. Dosing reminder card given with phone number, appointment date and time.   Return to clinic: 10/16 at 100   Referring physician: Dr. Andreas Darling Only    Intervention Detail: Adherence Monitorin and Dose Adjustment: 1, reason: Therapy Optimization  Total # of Interventions Recommended: 2  Total # of Interventions Accepted: 2  Time Spent (min): 15

## 2023-10-06 ENCOUNTER — HOSPITAL ENCOUNTER (OUTPATIENT)
Age: 68
Setting detail: SPECIMEN
Discharge: HOME | End: 2023-10-06

## 2023-10-16 ENCOUNTER — ANTI-COAG VISIT (OUTPATIENT)
Dept: PHARMACY | Age: 68
End: 2023-10-16
Payer: COMMERCIAL

## 2023-10-16 DIAGNOSIS — I48.0 PAROXYSMAL ATRIAL FIBRILLATION (HCC): Primary | ICD-10-CM

## 2023-10-16 LAB — INTERNATIONAL NORMALIZATION RATIO, POC: 3.6

## 2023-10-16 PROCEDURE — 99211 OFF/OP EST MAY X REQ PHY/QHP: CPT | Performed by: INTERNAL MEDICINE

## 2023-10-16 PROCEDURE — 85610 PROTHROMBIN TIME: CPT | Performed by: INTERNAL MEDICINE

## 2023-10-16 NOTE — PROGRESS NOTES
Ms. Marline Jolly is here for management of anticoagulation for Afib. PMH also significant for newly diagnosed lung CA , GERD, anxiety, depression, cardiomyopathy, hypothyroid, chronic back pain, hx of breast cancer, pacemaker placement, back surgery, and osteopenia. Pt verifies dosing regimen. Pt denies s/s bleeding/bruising/swelling/SOB. No changes in Rx/OTC/Herbal medications. Using Vegamour hair serum for [place] loss. She is on oxycodone for back pain. Pt is still having a couple servings of greens per week. Still drinking daily Premier Protein shakes, has 30mcg (25% DV) of vit K in it  Pt does not smoke and rarely drinks EtOH. She will be starting Chemo soon for Lung Ca. She was diagnosed with a carcinoma in her hip, starting second round of radiation next week. She has not being eating much due to stress, so we will decrease her dose. INR 3.6 is slightly below the therapeutic range of 2-3. Recommend to hold today, then decrease dose to 3mg daily, except 4.5mg on Wed  Patient has warfarin 3 mg tablets. Will continue to monitor and check INR in 3 weeks. Dosing reminder card given with phone number, appointment date and time.   Return to clinic:  at 100   Referring physician: Dr. Pieter Hooker Only    Intervention Detail: Adherence Monitorin and Dose Adjustment: 1, reason: Therapy De-escalation  Total # of Interventions Recommended: 2  Total # of Interventions Accepted: 2  Time Spent (min): 15

## 2023-11-06 ENCOUNTER — ANTI-COAG VISIT (OUTPATIENT)
Dept: PHARMACY | Age: 68
End: 2023-11-06
Payer: COMMERCIAL

## 2023-11-06 DIAGNOSIS — I48.0 PAROXYSMAL ATRIAL FIBRILLATION (HCC): Primary | ICD-10-CM

## 2023-11-06 LAB — INTERNATIONAL NORMALIZATION RATIO, POC: 3

## 2023-11-06 PROCEDURE — 99211 OFF/OP EST MAY X REQ PHY/QHP: CPT | Performed by: INTERNAL MEDICINE

## 2023-11-06 PROCEDURE — 85610 PROTHROMBIN TIME: CPT | Performed by: INTERNAL MEDICINE

## 2023-11-06 NOTE — PROGRESS NOTES
Ms. Brendan Greenfield is here for management of anticoagulation for Afib. PMH also significant for newly diagnosed lung CA , GERD, anxiety, depression, cardiomyopathy, hypothyroid, chronic back pain, hx of breast cancer, pacemaker placement, back surgery, and osteopenia. Pt verifies dosing regimen. Pt denies s/s bleeding/bruising/swelling/SOB. No changes in Rx/OTC/Herbal medications. Using Vegamour hair serum for [place] loss. She is on oxycodone for back pain. Pt is still having a couple servings of greens per week. Still drinking daily Premier Protein shakes, has 30mcg (25% DV) of vit K in it  Pt does not smoke and rarely drinks EtOH. She will be starting Chemo soon for Lung Ca. She was diagnosed with a carcinoma in her hip. She is in second round of radiation next week. (Her  is also battling lung cancer)    She has not being eating much due to stress, so we will decrease her dose. INR 3.0 is within the therapeutic range of 2-3. Will slightly further dose as she continues to be stressed and eating less. Due to radiation appts, she cannot come back sooner than 4 weeks, so I would rather \"play it safe\" and decrease her dose. Recommend to decrease dose to 3mg daily. Patient has warfarin 3 mg tablets. Will continue to monitor and check INR in 4 weeks. Dosing reminder card given with phone number, appointment date and time.   Return to clinic:  at 100   Referring physician: Dr. Burgess Grace Only    Intervention Detail: Adherence Monitorin and Dose Adjustment: 1, reason: Therapy De-escalation  Total # of Interventions Recommended: 2  Total # of Interventions Accepted: 2  Time Spent (min): 15

## 2023-11-10 ENCOUNTER — HOSPITAL ENCOUNTER (OUTPATIENT)
Age: 68
Discharge: HOME | End: 2023-11-10
Payer: COMMERCIAL

## 2023-11-10 ENCOUNTER — HOSPITAL ENCOUNTER (OUTPATIENT)
Dept: MRI IMAGING | Age: 68
Discharge: HOME | End: 2023-11-10
Payer: COMMERCIAL

## 2023-11-10 DIAGNOSIS — R42 DIZZINESS: ICD-10-CM

## 2023-11-10 LAB
BUN SERPL-MCNC: 28 MG/DL (ref 7–20)
CREAT SERPL-MCNC: 0.8 MG/DL (ref 0.6–1.2)
GFR SERPLBLD CREATININE-BSD FMLA CKD-EPI: >60 ML/MIN/{1.73_M2}

## 2023-11-10 PROCEDURE — C1751 CATH, INF, PER/CENT/MIDLINE: HCPCS

## 2023-11-10 PROCEDURE — 70553 MRI BRAIN STEM W/O & W/DYE: CPT

## 2023-11-10 PROCEDURE — A9579 GAD-BASE MR CONTRAST NOS,1ML: HCPCS | Performed by: INTERNAL MEDICINE

## 2023-11-10 PROCEDURE — 6360000004 HC RX CONTRAST MEDICATION: Performed by: INTERNAL MEDICINE

## 2023-11-10 PROCEDURE — 36415 COLL VENOUS BLD VENIPUNCTURE: CPT

## 2023-11-10 PROCEDURE — 82565 ASSAY OF CREATININE: CPT

## 2023-11-10 PROCEDURE — 84520 ASSAY OF UREA NITROGEN: CPT

## 2023-11-10 PROCEDURE — 36569 INSJ PICC 5 YR+ W/O IMAGING: CPT

## 2023-11-10 RX ADMIN — GADOTERIDOL 12 ML: 279.3 INJECTION, SOLUTION INTRAVENOUS at 17:46

## 2023-11-10 NOTE — PROGRESS NOTES
Upon arrival to place midline assessed chart for issues related to midline placement, check for consent, and did time out with RN . Pt. Tolerated midline placement well, no difficulty accessing basilic vein, threaded well, with free flowing blood return.  Reported off to RN

## 2023-12-04 ENCOUNTER — ANTI-COAG VISIT (OUTPATIENT)
Dept: PHARMACY | Age: 68
End: 2023-12-04
Payer: COMMERCIAL

## 2023-12-04 DIAGNOSIS — I48.0 PAROXYSMAL ATRIAL FIBRILLATION (HCC): Primary | ICD-10-CM

## 2023-12-04 LAB — INTERNATIONAL NORMALIZATION RATIO, POC: 3.4

## 2023-12-04 PROCEDURE — 85610 PROTHROMBIN TIME: CPT | Performed by: INTERNAL MEDICINE

## 2023-12-04 PROCEDURE — 99211 OFF/OP EST MAY X REQ PHY/QHP: CPT | Performed by: INTERNAL MEDICINE

## 2023-12-04 NOTE — PROGRESS NOTES
Ms. Sandra Miles is here for management of anticoagulation for Afib. PMH also significant for newly diagnosed lung CA , GERD, anxiety, depression, cardiomyopathy, hypothyroid, chronic back pain, hx of breast cancer, pacemaker placement, back surgery, and osteopenia. Pt verifies dosing regimen. Pt denies s/s bleeding/bruising/swelling/SOB. No changes in Rx/OTC/Herbal medications. Using Vegamour hair serum for [place] loss. She is on oxycodone for back pain. Pt is still having a couple servings of greens per week. Still drinking daily Premier Protein shakes, has 30mcg (25% DV) of vit K in it  Pt does not smoke and rarely drinks EtOH. She was diagnosed with a carcinoma in her hip. Finished radiation for now for lung cancer, but they will likely not do chemo. (Her  is also battling lung cancer)    She has not being eating much due to stress, so we will decrease her dose. INR 3.4 is above the therapeutic range of 2-3. Will slightly further dose as she continues to be stressed and eating less. Recommend to hold today, then further decrease dose to 3mg daily, except 1.5mg on Mon/Fri. Patient has warfarin 3 mg tablets. Will continue to monitor and check INR in 2 weeks. Dosing reminder card given with phone number, appointment date and time.   Return to clinic:  at 1100   Referring physician: Dr. Ginny Elizondo Only    Intervention Detail: Adherence Monitorin and Dose Adjustment: 1, reason: Therapy De-escalation  Total # of Interventions Recommended: 2  Total # of Interventions Accepted: 2  Time Spent (min): 15

## 2023-12-12 ENCOUNTER — APPOINTMENT (OUTPATIENT)
Dept: CT IMAGING | Age: 68
End: 2023-12-12
Payer: COMMERCIAL

## 2023-12-12 ENCOUNTER — APPOINTMENT (OUTPATIENT)
Dept: GENERAL RADIOLOGY | Age: 68
End: 2023-12-12
Payer: COMMERCIAL

## 2023-12-12 ENCOUNTER — HOSPITAL ENCOUNTER (EMERGENCY)
Age: 68
Discharge: HOME | End: 2023-12-13
Attending: STUDENT IN AN ORGANIZED HEALTH CARE EDUCATION/TRAINING PROGRAM
Payer: COMMERCIAL

## 2023-12-12 VITALS
OXYGEN SATURATION: 93 % | HEART RATE: 67 BPM | DIASTOLIC BLOOD PRESSURE: 51 MMHG | TEMPERATURE: 98.9 F | SYSTOLIC BLOOD PRESSURE: 105 MMHG | RESPIRATION RATE: 14 BRPM

## 2023-12-12 DIAGNOSIS — I95.1 ORTHOSTATIC INTOLERANCE: ICD-10-CM

## 2023-12-12 DIAGNOSIS — S62.102A CLOSED FRACTURE OF LEFT WRIST, INITIAL ENCOUNTER: ICD-10-CM
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
## 2023-12-12 LAB
ANION GAP SERPL CALCULATED.3IONS-SCNC: 12 MMOL/L (ref 3–16)
BASOPHILS # BLD: 0 K/UL (ref 0–0.2)
BASOPHILS NFR BLD: 0.4 %
BUN SERPL-MCNC: 15 MG/DL (ref 7–20)
CALCIUM SERPL-MCNC: 8.5 MG/DL (ref 8.3–10.6)
CHLORIDE SERPL-SCNC: 97 MMOL/L (ref 99–110)
CO2 SERPL-SCNC: 23 MMOL/L (ref 21–32)
CREAT SERPL-MCNC: 0.7 MG/DL (ref 0.6–1.2)
DEPRECATED RDW RBC AUTO: 15.7 % (ref 12.4–15.4)
EOSINOPHIL # BLD: 0 K/UL (ref 0–0.6)
EOSINOPHIL NFR BLD: 0.2 %
GFR SERPLBLD CREATININE-BSD FMLA CKD-EPI: >60 ML/MIN/{1.73_M2}
GLUCOSE SERPL-MCNC: 93 MG/DL (ref 70–99)
HCT VFR BLD AUTO: 33.4 % (ref 36–48)
HGB BLD-MCNC: 11.3 G/DL (ref 12–16)
LYMPHOCYTES # BLD: 0.3 K/UL (ref 1–5.1)
LYMPHOCYTES NFR BLD: 4.2 %
MCH RBC QN AUTO: 32.7 PG (ref 26–34)
MCHC RBC AUTO-ENTMCNC: 33.7 G/DL (ref 31–36)
MCV RBC AUTO: 97 FL (ref 80–100)
MONOCYTES # BLD: 0.8 K/UL (ref 0–1.3)
MONOCYTES NFR BLD: 13.6 %
NEUTROPHILS # BLD: 5.1 K/UL (ref 1.7–7.7)
NEUTROPHILS NFR BLD: 81.6 %
PLATELET # BLD AUTO: 132 K/UL (ref 135–450)
PMV BLD AUTO: 6.3 FL (ref 5–10.5)
POTASSIUM SERPL-SCNC: 3.8 MMOL/L (ref 3.5–5.1)
RBC # BLD AUTO: 3.44 M/UL (ref 4–5.2)
SODIUM SERPL-SCNC: 132 MMOL/L (ref 136–145)
TROPONIN, HIGH SENSITIVITY: 11 NG/L (ref 0–14)
WBC # BLD AUTO: 6.3 K/UL (ref 4–11)

## 2023-12-12 PROCEDURE — 6360000002 HC RX W HCPCS: Performed by: STUDENT IN AN ORGANIZED HEALTH CARE EDUCATION/TRAINING PROGRAM

## 2023-12-12 PROCEDURE — 71250 CT THORAX DX C-: CPT

## 2023-12-12 PROCEDURE — 84484 ASSAY OF TROPONIN QUANT: CPT

## 2023-12-12 PROCEDURE — 70450 CT HEAD/BRAIN W/O DYE: CPT

## 2023-12-12 PROCEDURE — 73030 X-RAY EXAM OF SHOULDER: CPT

## 2023-12-12 PROCEDURE — 85025 COMPLETE CBC W/AUTO DIFF WBC: CPT

## 2023-12-12 PROCEDURE — 73100 X-RAY EXAM OF WRIST: CPT

## 2023-12-12 PROCEDURE — 99285 EMERGENCY DEPT VISIT HI MDM: CPT

## 2023-12-12 PROCEDURE — 71045 X-RAY EXAM CHEST 1 VIEW: CPT

## 2023-12-12 PROCEDURE — 93005 ELECTROCARDIOGRAM TRACING: CPT | Performed by: STUDENT IN AN ORGANIZED HEALTH CARE EDUCATION/TRAINING PROGRAM

## 2023-12-12 PROCEDURE — 25605 CLTX DST RDL FX/EPHYS SEP W/: CPT

## 2023-12-12 PROCEDURE — 73080 X-RAY EXAM OF ELBOW: CPT

## 2023-12-12 PROCEDURE — 80048 BASIC METABOLIC PNL TOTAL CA: CPT

## 2023-12-12 PROCEDURE — 96374 THER/PROPH/DIAG INJ IV PUSH: CPT

## 2023-12-12 PROCEDURE — 72125 CT NECK SPINE W/O DYE: CPT

## 2023-12-12 PROCEDURE — 6370000000 HC RX 637 (ALT 250 FOR IP): Performed by: STUDENT IN AN ORGANIZED HEALTH CARE EDUCATION/TRAINING PROGRAM

## 2023-12-12 RX ORDER — MAGNESIUM SULFATE IN WATER 40 MG/ML
2000 INJECTION, SOLUTION INTRAVENOUS ONCE
Status: DISCONTINUED | OUTPATIENT
Start: 2023-12-12 | End: 2023-12-12

## 2023-12-12 RX ORDER — OXYCODONE HYDROCHLORIDE 5 MG/1
10 TABLET ORAL ONCE
Status: COMPLETED | OUTPATIENT
Start: 2023-12-12 | End: 2023-12-12

## 2023-12-12 RX ORDER — FENTANYL CITRATE 50 UG/ML
50 INJECTION, SOLUTION INTRAMUSCULAR; INTRAVENOUS ONCE
Status: COMPLETED | OUTPATIENT
Start: 2023-12-12 | End: 2023-12-12

## 2023-12-12 RX ORDER — POTASSIUM CHLORIDE 20 MEQ/1
40 TABLET, EXTENDED RELEASE ORAL ONCE
Status: DISCONTINUED | OUTPATIENT
Start: 2023-12-12 | End: 2023-12-13 | Stop reason: HOSPADM

## 2023-12-12 RX ADMIN — OXYCODONE HYDROCHLORIDE 10 MG: 5 TABLET ORAL at 20:22

## 2023-12-12 RX ADMIN — FENTANYL CITRATE 50 MCG: 50 INJECTION INTRAMUSCULAR; INTRAVENOUS at 23:18

## 2023-12-12 ASSESSMENT — PAIN - FUNCTIONAL ASSESSMENT
PAIN_FUNCTIONAL_ASSESSMENT: 0-10
PAIN_FUNCTIONAL_ASSESSMENT: 0-10

## 2023-12-12 ASSESSMENT — PAIN DESCRIPTION - LOCATION
LOCATION: WRIST
LOCATION: ARM

## 2023-12-12 ASSESSMENT — PAIN DESCRIPTION - PAIN TYPE
TYPE: ACUTE PAIN
TYPE: OTHER (COMMENT)

## 2023-12-12 ASSESSMENT — PAIN SCALES - GENERAL
PAINLEVEL_OUTOF10: 9
PAINLEVEL_OUTOF10: 10

## 2023-12-12 ASSESSMENT — PAIN DESCRIPTION - ORIENTATION
ORIENTATION: LEFT
ORIENTATION: LEFT

## 2023-12-13 LAB
EKG ATRIAL RATE: 71 BPM
EKG DIAGNOSIS: NORMAL
EKG P AXIS: 87 DEGREES
EKG P-R INTERVAL: 158 MS
EKG Q-T INTERVAL: 526 MS
EKG QRS DURATION: 132 MS
EKG QTC CALCULATION (BAZETT): 571 MS
EKG R AXIS: -41 DEGREES
EKG T AXIS: 140 DEGREES
EKG VENTRICULAR RATE: 71 BPM

## 2023-12-13 PROCEDURE — 93010 ELECTROCARDIOGRAM REPORT: CPT | Performed by: INTERNAL MEDICINE

## 2023-12-13 NOTE — ED PROVIDER NOTES
of labs as well as imaging beyond the left wrist in terms of trauma. Regarding the recommendation for CT scan the patient has a known history of lung cancer and has been expected to obtain CT imaging and will follow-up the results with her oncologist with regard to her pulmonary nodules concern for metastatic disease. The left wrist fracture was reduced and splinted as per the procedure note without complication pending x-ray imaging for postreduction assessment [NG]   2338 Regarding her lung nodules they have recently been evaluated in August of this year at another facility. [NG]   9623 The patient is reassessed and her pain is well-controlled. She is resting comfortably. I reviewed her postreduction films which demonstrates adequate alignment successful reduction. The patient is comfortable in the splint and sling is applied. Tertiary exam was completed there are no new areas of injury. Her clinical course has otherwise been reassuring. We discussed her CT chest results and follow-up to her oncologist.  The patient otherwise requires no further testing or treatment in the emergency department and is appropriate for discharge with a plan to visit with orthopedics for her wrist fracture this week. Patient is agreeable to this plan this concludes her ED course. She is discharged in care of her  [NG]   2358 Please note the patient was well aware of her radial head fracture which is subacute. Also the patient has adequate supply of pain medications to support her recovery at home. We did discuss the risk of pain medications contributing to her recurrent falls. [NG]      ED Course User Index  [NG] Gasper Mckinnon MD       Additional Reassessment    Is this patient to be included in the SEP-1 core measure? No Exclusion criteria - the patient is NOT to be included for SEP-1 Core Measure due to:  Infection is not suspected    Medical Decision Making  Presentation for orthostatic syndrome contributing to

## 2023-12-13 NOTE — DISCHARGE INSTRUCTIONS
You were evaluated in the emergency department for fall with left wrist fracture. Assessments and testing completed during your visit were reassuring and at this time there is no indication for further testing, treatment or admission to the hospital. Given this it is appropriate to discharge you from the emergency department. At the time of discharge we discussed the following:    Please reference the enclosed information regarding care of your splint and your injury. As we discussed please call the orthopedic clinic this week to arrange an appointment and for long-term management of your broken wrist.  I expect you will require surgery. If you develop increasing pain or dysfunction of the hand please return to the emergency department otherwise please keep the injury elevated in the splint and you may use the pain reliever of your choice for management of your condition. Additionally please discuss this visit as well as the new CT scan findings with your oncologist.    Please note that sometimes it is difficult to diagnose a medical condition early in the disease process before the disease is fully manifest. Because of this, should you develop any new or worsening symptoms, you may return at any time to the emergency department for another evaluation. If available you are also recommended to review this visit with your primary care physician or other medical provider in the next 7 days. Thank you for allowing us to care for you today.

## 2023-12-14 ENCOUNTER — OFFICE VISIT (OUTPATIENT)
Dept: ORTHOPEDIC SURGERY | Age: 68
End: 2023-12-14
Payer: MEDICARE

## 2023-12-14 ENCOUNTER — TELEPHONE (OUTPATIENT)
Dept: ORTHOPEDIC SURGERY | Age: 68
End: 2023-12-14

## 2023-12-14 VITALS — BODY MASS INDEX: 23.21 KG/M2 | WEIGHT: 131 LBS | HEIGHT: 63 IN

## 2023-12-14 DIAGNOSIS — M25.532 LEFT WRIST PAIN: Primary | ICD-10-CM

## 2023-12-14 DIAGNOSIS — S52.502A CLOSED FRACTURE OF DISTAL END OF LEFT RADIUS, UNSPECIFIED FRACTURE MORPHOLOGY, INITIAL ENCOUNTER: ICD-10-CM

## 2023-12-14 PROCEDURE — 1090F PRES/ABSN URINE INCON ASSESS: CPT | Performed by: ORTHOPAEDIC SURGERY

## 2023-12-14 PROCEDURE — G8427 DOCREV CUR MEDS BY ELIG CLIN: HCPCS | Performed by: ORTHOPAEDIC SURGERY

## 2023-12-14 PROCEDURE — G8420 CALC BMI NORM PARAMETERS: HCPCS | Performed by: ORTHOPAEDIC SURGERY

## 2023-12-14 PROCEDURE — 99204 OFFICE O/P NEW MOD 45 MIN: CPT | Performed by: ORTHOPAEDIC SURGERY

## 2023-12-14 PROCEDURE — 3017F COLORECTAL CA SCREEN DOC REV: CPT | Performed by: ORTHOPAEDIC SURGERY

## 2023-12-14 PROCEDURE — 1123F ACP DISCUSS/DSCN MKR DOCD: CPT | Performed by: ORTHOPAEDIC SURGERY

## 2023-12-14 PROCEDURE — G8484 FLU IMMUNIZE NO ADMIN: HCPCS | Performed by: ORTHOPAEDIC SURGERY

## 2023-12-14 PROCEDURE — 1036F TOBACCO NON-USER: CPT | Performed by: ORTHOPAEDIC SURGERY

## 2023-12-14 PROCEDURE — G8400 PT W/DXA NO RESULTS DOC: HCPCS | Performed by: ORTHOPAEDIC SURGERY

## 2023-12-14 NOTE — PROGRESS NOTES
effort is normal. No respiratory distress. Neurological:      Mental Status: He is alert and oriented to person, place, and time. Mental status is at baseline. Gait:  normal    Physical exam of the left wrist demonstrates fingers pink and warm. Appropriate fitting splint. Tenderness over the distal radius and ulna. Sensation intact and appropriate    Imaging    Left  wrist: [facility]  Radiographs: AP, lateral, and oblique views left wrist were ordered today and reviewed. They demonstrate a comminuted displaced distal radius and ulna fracture    I have also reviewed pre and postreduction films of the left wrist from the emergency room on 12/12/2023. These do demonstrate a displaced and comminuted distal radius and ulna fracture with improved alignment on postreduction film      Procedure:  Orders Placed This Encounter   Procedures    XR WRIST LEFT (MIN 3 VIEWS)     Standing Status:   Future     Number of Occurrences:   1     Standing Expiration Date:   12/13/2024       Assessment and Plan  Michela Matos was seen today for wrist pain. Diagnoses and all orders for this visit:    Left wrist pain  -     XR WRIST LEFT (MIN 3 VIEWS); Future    Closed fracture of distal end of left radius, unspecified fracture morphology, initial encounter        Patient has a displaced and comminuted unstable distal radius and ulna fracture. I have referred the patient to trauma specialist Dr. Malika Rodriguez. Patient can see me back as needed    I discussed with Noemy Ramirez that her history, symptoms, signs, and imaging are most consistent with  distal radius and ulnar fracture . We reviewed the natural history of these conditions and treatment options ranging from conservative measures (rest, icing, activity modification, physical therapy, pain meds) to surgical options.      In terms of treatment, I recommended continuing with rest, icing, avoidance of painful activities, NSAIDs or pain meds as tolerated, and

## 2023-12-14 NOTE — TELEPHONE ENCOUNTER
Called patient. Left voicemail stating that Dr. Basilio Her would like to speak to her and to call us back at [phone]. No

## 2023-12-15 ENCOUNTER — TELEPHONE (OUTPATIENT)
Dept: ORTHOPEDIC SURGERY | Age: 68
End: 2023-12-15

## 2023-12-15 DIAGNOSIS — S52.502A CLOSED FRACTURE OF DISTAL END OF LEFT RADIUS, UNSPECIFIED FRACTURE MORPHOLOGY, INITIAL ENCOUNTER: Primary | ICD-10-CM

## 2023-12-15 NOTE — TELEPHONE ENCOUNTER
Auth: NPR  Date: 12/18/23   Reference # None  Spoke with: None  Type of SX: Outpatient  Location: W   CPT: 54204   DX: S52.502A  SX area: LT wrist  Insurance: Medicare A&B

## 2023-12-18 PROBLEM — S52.502A CLOSED FRACTURE OF LEFT DISTAL RADIUS: Status: ACTIVE | Noted: 2023-12-18

## 2023-12-26 ENCOUNTER — TELEPHONE (OUTPATIENT)
Dept: CASE MANAGEMENT | Age: 68
End: 2023-12-26

## 2023-12-26 NOTE — TELEPHONE ENCOUNTER
Imaging report CT Chest 12/12/23 with f/u imaging recommendations sent to Ani Asencio MD    [facility]([email or web address]

## 2024-01-01 ENCOUNTER — APPOINTMENT (OUTPATIENT)
Dept: GENERAL RADIOLOGY | Age: 69
End: 2024-01-01
Payer: COMMERCIAL

## 2024-01-01 ENCOUNTER — HOSPITAL ENCOUNTER (EMERGENCY)
Age: 69
End: 2024-06-25
Attending: STUDENT IN AN ORGANIZED HEALTH CARE EDUCATION/TRAINING PROGRAM
Payer: COMMERCIAL

## 2024-01-01 VITALS
HEART RATE: 87 BPM | SYSTOLIC BLOOD PRESSURE: 90 MMHG | OXYGEN SATURATION: 98 % | RESPIRATION RATE: 18 BRPM | TEMPERATURE: 92.8 F | DIASTOLIC BLOOD PRESSURE: 61 MMHG

## 2024-01-01 DIAGNOSIS — N17.9 AKI (ACUTE KIDNEY INJURY) (HCC): ICD-10-CM

## 2024-01-01 DIAGNOSIS — K92.2 UPPER GI BLEED: ICD-10-CM

## 2024-01-01 DIAGNOSIS — Z71.89 GOALS OF CARE, COUNSELING/DISCUSSION: ICD-10-CM

## 2024-01-01 DIAGNOSIS — I46.9 CARDIAC ARREST (HCC): Primary | ICD-10-CM

## 2024-01-01 DIAGNOSIS — J90 PLEURAL EFFUSION ON RIGHT: ICD-10-CM

## 2024-01-01 LAB
ABO + RH BLD: NORMAL
ALBUMIN SERPL-MCNC: 2.4 G/DL (ref 3.4–5)
ALBUMIN/GLOB SERPL: 1 {RATIO} (ref 1.1–2.2)
ALP SERPL-CCNC: 155 U/L (ref 40–129)
ALT SERPL-CCNC: 21 U/L (ref 10–40)
ANION GAP SERPL CALCULATED.3IONS-SCNC: 21 MMOL/L (ref 3–16)
ANISOCYTOSIS BLD QL SMEAR: ABNORMAL
APTT BLD: 61.1 SEC (ref 22.1–36.4)
AST SERPL-CCNC: 55 U/L (ref 15–37)
BACTERIA URNS QL MICRO: ABNORMAL /HPF
BASE EXCESS BLDV CALC-SCNC: -10.4 MMOL/L (ref -3–3)
BASOPHILS # BLD: 0 K/UL (ref 0–0.2)
BASOPHILS NFR BLD: 0 %
BILIRUB SERPL-MCNC: 1.5 MG/DL (ref 0–1)
BILIRUB UR QL STRIP.AUTO: ABNORMAL
BLD GP AB SCN SERPL QL: NORMAL
BLOOD BANK DISPENSE STATUS: NORMAL
BLOOD BANK DISPENSE STATUS: NORMAL
BLOOD BANK PRODUCT CODE: NORMAL
BLOOD BANK PRODUCT CODE: NORMAL
BPU ID: NORMAL
BPU ID: NORMAL
BUN SERPL-MCNC: 54 MG/DL (ref 7–20)
BURR CELLS BLD QL SMEAR: ABNORMAL
CALCIUM SERPL-MCNC: 8.7 MG/DL (ref 8.3–10.6)
CHARACTER UR: ABNORMAL
CHLORIDE SERPL-SCNC: 94 MMOL/L (ref 99–110)
CLARITY UR: CLEAR
CO2 BLDV-SCNC: 22 MMOL/L
CO2 SERPL-SCNC: 20 MMOL/L (ref 21–32)
COHGB MFR BLDV: 6 % (ref 0–1.5)
COLOR UR: YELLOW
CREAT SERPL-MCNC: 1.6 MG/DL (ref 0.6–1.2)
DEPRECATED RDW RBC AUTO: 20 % (ref 12.4–15.4)
DESCRIPTION BLOOD BANK: NORMAL
DESCRIPTION BLOOD BANK: NORMAL
EKG ATRIAL RATE: 73 BPM
EKG DIAGNOSIS: NORMAL
EKG P AXIS: 98 DEGREES
EKG P-R INTERVAL: 226 MS
EKG Q-T INTERVAL: 488 MS
EKG QRS DURATION: 148 MS
EKG QTC CALCULATION (BAZETT): 537 MS
EKG R AXIS: -79 DEGREES
EKG T AXIS: 143 DEGREES
EKG VENTRICULAR RATE: 73 BPM
EOSINOPHIL # BLD: 0 K/UL (ref 0–0.6)
EOSINOPHIL NFR BLD: 0 %
GFR SERPLBLD CREATININE-BSD FMLA CKD-EPI: 34 ML/MIN/{1.73_M2}
GLUCOSE SERPL-MCNC: 212 MG/DL (ref 70–99)
GLUCOSE UR STRIP.AUTO-MCNC: NEGATIVE MG/DL
HCO3 BLDV-SCNC: 19.4 MMOL/L (ref 23–29)
HCT VFR BLD AUTO: 20.3 % (ref 36–48)
HGB BLD-MCNC: 6.4 G/DL (ref 12–16)
HGB UR QL STRIP.AUTO: ABNORMAL
INR PPP: 14.76 (ref 0.85–1.15)
KETONES UR STRIP.AUTO-MCNC: NEGATIVE MG/DL
LACTATE BLDV-SCNC: 10.7 MMOL/L (ref 0.4–1.9)
LEUKOCYTE ESTERASE UR QL STRIP.AUTO: ABNORMAL
LYMPHOCYTES # BLD: 0.6 K/UL (ref 1–5.1)
LYMPHOCYTES NFR BLD: 4 %
MACROCYTES BLD QL SMEAR: ABNORMAL
MCH RBC QN AUTO: 33.4 PG (ref 26–34)
MCHC RBC AUTO-ENTMCNC: 31.5 G/DL (ref 31–36)
MCV RBC AUTO: 106 FL (ref 80–100)
METAMYELOCYTES NFR BLD MANUAL: 1 %
METHGB MFR BLDV: 0.3 %
MICROCYTES BLD QL SMEAR: ABNORMAL
MONOCYTES # BLD: 0.6 K/UL (ref 0–1.3)
MONOCYTES NFR BLD: 4 %
NEUTROPHILS # BLD: 13.6 K/UL (ref 1.7–7.7)
NEUTROPHILS NFR BLD: 83 %
NEUTS BAND NFR BLD MANUAL: 8 % (ref 0–7)
NITRITE UR QL STRIP.AUTO: NEGATIVE
O2 THERAPY: ABNORMAL
OVALOCYTES BLD QL SMEAR: ABNORMAL
PCO2 BLDV: 70 MMHG (ref 40–50)
PH BLDV: 7.06 [PH] (ref 7.35–7.45)
PH UR STRIP.AUTO: 6 [PH] (ref 5–8)
PLATELET # BLD AUTO: 70 K/UL (ref 135–450)
PLATELET BLD QL SMEAR: ABNORMAL
PMV BLD AUTO: 9.3 FL (ref 5–10.5)
PO2 BLDV: 48.4 MMHG (ref 25–40)
POIKILOCYTOSIS BLD QL SMEAR: ABNORMAL
POLYCHROMASIA BLD QL SMEAR: ABNORMAL
POTASSIUM SERPL-SCNC: 4.8 MMOL/L (ref 3.5–5.1)
PROT SERPL-MCNC: 4.8 G/DL (ref 6.4–8.2)
PROT UR STRIP.AUTO-MCNC: 30 MG/DL
PROTHROMBIN TIME: 104.6 SEC (ref 11.9–14.9)
RBC # BLD AUTO: 1.91 M/UL (ref 4–5.2)
RBC #/AREA URNS HPF: ABNORMAL /HPF (ref 0–4)
RENAL EPI CELLS #/AREA UR COMP ASSIST: ABNORMAL /HPF (ref 0–1)
SAO2 % BLDV: 67 %
SLIDE REVIEW: ABNORMAL
SODIUM SERPL-SCNC: 135 MMOL/L (ref 136–145)
SP GR UR STRIP.AUTO: 1.02 (ref 1–1.03)
TROPONIN, HIGH SENSITIVITY: 79 NG/L (ref 0–14)
TROPONIN, HIGH SENSITIVITY: 99 NG/L (ref 0–14)
UA COMPLETE W REFLEX CULTURE PNL UR: YES
UA DIPSTICK W REFLEX MICRO PNL UR: YES
URN SPEC COLLECT METH UR: ABNORMAL
UROBILINOGEN UR STRIP-ACNC: 1 E.U./DL
WBC # BLD AUTO: 14.8 K/UL (ref 4–11)
WBC #/AREA URNS HPF: ABNORMAL /HPF (ref 0–5)

## 2024-01-01 PROCEDURE — 81001 URINALYSIS AUTO W/SCOPE: CPT

## 2024-01-01 PROCEDURE — 2580000003 HC RX 258: Performed by: STUDENT IN AN ORGANIZED HEALTH CARE EDUCATION/TRAINING PROGRAM

## 2024-01-01 PROCEDURE — 93005 ELECTROCARDIOGRAM TRACING: CPT | Performed by: STUDENT IN AN ORGANIZED HEALTH CARE EDUCATION/TRAINING PROGRAM

## 2024-01-01 PROCEDURE — 87086 URINE CULTURE/COLONY COUNT: CPT

## 2024-01-01 PROCEDURE — 31500 INSERT EMERGENCY AIRWAY: CPT

## 2024-01-01 PROCEDURE — 71045 X-RAY EXAM CHEST 1 VIEW: CPT

## 2024-01-01 PROCEDURE — 87186 SC STD MICRODIL/AGAR DIL: CPT

## 2024-01-01 PROCEDURE — 86901 BLOOD TYPING SEROLOGIC RH(D): CPT

## 2024-01-01 PROCEDURE — 82803 BLOOD GASES ANY COMBINATION: CPT

## 2024-01-01 PROCEDURE — 6360000002 HC RX W HCPCS: Performed by: STUDENT IN AN ORGANIZED HEALTH CARE EDUCATION/TRAINING PROGRAM

## 2024-01-01 PROCEDURE — 87088 URINE BACTERIA CULTURE: CPT

## 2024-01-01 PROCEDURE — 96365 THER/PROPH/DIAG IV INF INIT: CPT

## 2024-01-01 PROCEDURE — 96374 THER/PROPH/DIAG INJ IV PUSH: CPT

## 2024-01-01 PROCEDURE — 86850 RBC ANTIBODY SCREEN: CPT

## 2024-01-01 PROCEDURE — 86923 COMPATIBILITY TEST ELECTRIC: CPT

## 2024-01-01 PROCEDURE — 85730 THROMBOPLASTIN TIME PARTIAL: CPT

## 2024-01-01 PROCEDURE — 87150 DNA/RNA AMPLIFIED PROBE: CPT

## 2024-01-01 PROCEDURE — 85610 PROTHROMBIN TIME: CPT

## 2024-01-01 PROCEDURE — 51702 INSERT TEMP BLADDER CATH: CPT

## 2024-01-01 PROCEDURE — 36430 TRANSFUSION BLD/BLD COMPNT: CPT

## 2024-01-01 PROCEDURE — 85025 COMPLETE CBC W/AUTO DIFF WBC: CPT

## 2024-01-01 PROCEDURE — 80053 COMPREHEN METABOLIC PANEL: CPT

## 2024-01-01 PROCEDURE — C9113 INJ PANTOPRAZOLE SODIUM, VIA: HCPCS | Performed by: STUDENT IN AN ORGANIZED HEALTH CARE EDUCATION/TRAINING PROGRAM

## 2024-01-01 PROCEDURE — 99285 EMERGENCY DEPT VISIT HI MDM: CPT

## 2024-01-01 PROCEDURE — 96368 THER/DIAG CONCURRENT INF: CPT

## 2024-01-01 PROCEDURE — 2500000003 HC RX 250 WO HCPCS: Performed by: STUDENT IN AN ORGANIZED HEALTH CARE EDUCATION/TRAINING PROGRAM

## 2024-01-01 PROCEDURE — P9016 RBC LEUKOCYTES REDUCED: HCPCS

## 2024-01-01 PROCEDURE — 93010 ELECTROCARDIOGRAM REPORT: CPT | Performed by: INTERNAL MEDICINE

## 2024-01-01 PROCEDURE — 86900 BLOOD TYPING SEROLOGIC ABO: CPT

## 2024-01-01 PROCEDURE — 84484 ASSAY OF TROPONIN QUANT: CPT

## 2024-01-01 PROCEDURE — 87040 BLOOD CULTURE FOR BACTERIA: CPT

## 2024-01-01 PROCEDURE — 96375 TX/PRO/DX INJ NEW DRUG ADDON: CPT

## 2024-01-01 PROCEDURE — 83605 ASSAY OF LACTIC ACID: CPT

## 2024-01-01 PROCEDURE — 36556 INSERT NON-TUNNEL CV CATH: CPT

## 2024-01-01 PROCEDURE — 36415 COLL VENOUS BLD VENIPUNCTURE: CPT

## 2024-01-01 RX ORDER — SODIUM CHLORIDE, SODIUM LACTATE, POTASSIUM CHLORIDE, CALCIUM CHLORIDE 600; 310; 30; 20 MG/100ML; MG/100ML; MG/100ML; MG/100ML
1000 INJECTION, SOLUTION INTRAVENOUS ONCE
Status: COMPLETED | OUTPATIENT
Start: 2024-01-01 | End: 2024-01-01

## 2024-01-01 RX ORDER — SODIUM CHLORIDE 9 MG/ML
INJECTION, SOLUTION INTRAVENOUS PRN
Status: DISCONTINUED | OUTPATIENT
Start: 2024-01-01 | End: 2024-01-01

## 2024-01-01 RX ORDER — MIDAZOLAM HYDROCHLORIDE 1 MG/ML
1-10 INJECTION, SOLUTION INTRAVENOUS CONTINUOUS
Status: DISCONTINUED | OUTPATIENT
Start: 2024-01-01 | End: 2024-01-01

## 2024-01-01 RX ORDER — FENTANYL CITRATE-0.9 % NACL/PF 10 MCG/ML
25-200 PLASTIC BAG, INJECTION (ML) INTRAVENOUS CONTINUOUS
Status: DISCONTINUED | OUTPATIENT
Start: 2024-01-01 | End: 2024-01-01 | Stop reason: HOSPADM

## 2024-01-01 RX ORDER — SODIUM CHLORIDE 9 MG/ML
50 INJECTION, SOLUTION INTRAVENOUS ONCE
Status: DISCONTINUED | OUTPATIENT
Start: 2024-01-01 | End: 2024-01-01

## 2024-01-01 RX ORDER — SODIUM CHLORIDE, SODIUM LACTATE, POTASSIUM CHLORIDE, AND CALCIUM CHLORIDE .6; .31; .03; .02 G/100ML; G/100ML; G/100ML; G/100ML
1000 INJECTION, SOLUTION INTRAVENOUS ONCE
Status: COMPLETED | OUTPATIENT
Start: 2024-01-01 | End: 2024-01-01

## 2024-01-01 RX ORDER — PROPOFOL 10 MG/ML
INJECTION, EMULSION INTRAVENOUS
Status: DISCONTINUED
Start: 2024-01-01 | End: 2024-01-01 | Stop reason: HOSPADM

## 2024-01-01 RX ADMIN — SODIUM CHLORIDE, POTASSIUM CHLORIDE, SODIUM LACTATE AND CALCIUM CHLORIDE 1000 ML: 600; 310; 30; 20 INJECTION, SOLUTION INTRAVENOUS at 22:27

## 2024-01-01 RX ADMIN — Medication 50 MCG/HR: at 22:57

## 2024-01-01 RX ADMIN — SODIUM CHLORIDE, POTASSIUM CHLORIDE, SODIUM LACTATE AND CALCIUM CHLORIDE 1000 ML: 600; 310; 30; 20 INJECTION, SOLUTION INTRAVENOUS at 23:51

## 2024-01-01 RX ADMIN — PANTOPRAZOLE SODIUM 8 MG/HR: 40 INJECTION, POWDER, FOR SOLUTION INTRAVENOUS at 00:20

## 2024-01-01 RX ADMIN — PANTOPRAZOLE SODIUM 40 MG: 40 INJECTION, POWDER, FOR SOLUTION INTRAVENOUS at 00:08

## 2024-01-01 RX ADMIN — MIDAZOLAM IN SODIUM CHLORIDE 2 MG/HR: 1 INJECTION INTRAVENOUS at 23:40

## 2024-01-01 RX ADMIN — SODIUM CHLORIDE 20 MCG/MIN: 9 INJECTION, SOLUTION INTRAVENOUS at 22:51

## 2024-01-02 ENCOUNTER — OFFICE VISIT (OUTPATIENT)
Dept: ORTHOPEDIC SURGERY | Age: 69
End: 2024-01-02
Payer: COMMERCIAL

## 2024-01-02 DIAGNOSIS — S52.502A CLOSED FRACTURE OF DISTAL END OF LEFT RADIUS, UNSPECIFIED FRACTURE MORPHOLOGY, INITIAL ENCOUNTER: Primary | ICD-10-CM

## 2024-01-02 PROCEDURE — 99024 POSTOP FOLLOW-UP VISIT: CPT | Performed by: ORTHOPAEDIC SURGERY

## 2024-01-02 PROCEDURE — L3908 WHO COCK-UP NONMOLDE PRE OTS: HCPCS | Performed by: ORTHOPAEDIC SURGERY

## 2024-01-03 NOTE — PROGRESS NOTES
DIAGNOSIS:  Left distal radius 3-4 severely comminuted parts intra-articular displaced fracture, status post ORIF.    DATE OF SURGERY:  12/18/2023.    HISTORY OF PRESENT ILLNESS:  Ms. Roger 68 y.o.  female right handed returns today  for 2 weeks postoperative visit.  The patient denies any significant pain in the left wrist. Rates pain a 5/10 VAS mild, aching, No numbness or tingling sensation. No fever or Chills. She  is in a splint.    PHYSICAL EXAMINATION:  The incision is completely healed .  No signs of any erythema or drainage. She  has no pain with the active or passive range of motion of the left wrist, but decrease ROM.  She  has intact sensation, distally, and she  is neurovascularly intact.    IMAGING:  Three views left wrist taken today in the office showed anatomic alignment of left distal radius, plate and screws in good position, no loosening.      IMPRESSION:  2 weeks out from left distal radius ORIF and doing very well.    PLAN:  I have told the patient to work on ROM, as well as strengthening exercises. A removable forearm brace applied. No heavy impact activities. The patient will come back for a follow up in 6 weeks.  At that time, we will take 3 views of the left wrist. PT if needed then.    As this patient has demonstrated risk factors for osteoporosis, such as age and evidence of a fracture, I have referred the patient back to the primary care physician for evaluation for osteoporosis, including consideration for DEXA scanning, if this is felt to be clinically indicated.  The patient is advised to contact the primary care physician to follow-up for further evaluation.         Procedures    Natalia Dyer Titan Wrist Long Forearm Brace     Patient was prescribed a Nataila Dyer Titan Wrist and Forearm Brace.   The left wrist will require stabilization / immobilization from this semi-rigid / rigid orthosis to improve their function.  The orthosis will assist in protecting the affected area,

## 2024-01-19 ENCOUNTER — ANTI-COAG VISIT (OUTPATIENT)
Dept: PHARMACY | Age: 69
End: 2024-01-19

## 2024-01-19 DIAGNOSIS — I48.0 PAROXYSMAL ATRIAL FIBRILLATION (HCC): Primary | ICD-10-CM

## 2024-01-19 LAB — INR BLD: 3

## 2024-01-19 NOTE — PROGRESS NOTES
Ms. Roger is here for management of anticoagulation for Afib.  PMH also significant for newly diagnosed lung CA , GERD, anxiety, depression, cardiomyopathy, hypothyroid, chronic back pain, hx of breast cancer, pacemaker placement, back surgery, and osteopenia.  Pt verifies dosing regimen.  Pt denies s/s bleeding/bruising/swelling/SOB.   No changes in Rx/OTC/Herbal medications.  Using Vegamour hair serum for [place] loss.  She is on oxycodone for back pain.   Pt is still having a couple servings of greens per week.  Still drinking daily Premier Protein shakes, has 30mcg (25% DV) of vit K in it  Pt does not smoke and rarely drinks EtOH.    She was diagnosed with a carcinoma in her hip.  Finished radiation for now for lung cancer, but they will likely not do chemo.  (Her  is also battling lung cancer)      [facility] called with INR of 3.0 and reports patient has been taking 3 mg daily      INR 3.0 is within the therapeutic range of 2-3.   Recommend to continue with 3 mg daily  Patient has warfarin 3 mg tablets.   Will continue to monitor and check INR in 1 weeks.   Dosing reminder card given with phone number, appointment date and time.  Return to clinic: ~ 1 WEEK  Referring physician: Dr. Grady Be

## 2024-01-25 ENCOUNTER — ANTI-COAG VISIT (OUTPATIENT)
Dept: PHARMACY | Age: 69
End: 2024-01-25

## 2024-01-25 DIAGNOSIS — I48.0 PAROXYSMAL ATRIAL FIBRILLATION (HCC): Primary | ICD-10-CM

## 2024-01-25 LAB — INTERNATIONAL NORMALIZATION RATIO, POC: 4.5

## 2024-01-25 NOTE — PROGRESS NOTES
Ms. Roger is here for management of anticoagulation for Afib.  PMH also significant for newly diagnosed lung CA , GERD, anxiety, depression, cardiomyopathy, hypothyroid, chronic back pain, hx of breast cancer, pacemaker placement, back surgery, and osteopenia.  Pt verifies dosing regimen.  Pt denies s/s bleeding/bruising/swelling/SOB.   No changes in Rx/OTC/Herbal medications.  Using Vegamour hair serum for [place] loss.  She is on oxycodone for back pain.   Pt is still having a couple servings of greens per week.  Still drinking daily Premier Protein shakes, has 30mcg (25% DV) of vit K in it  Pt does not smoke and rarely drinks EtOH.    She was diagnosed with a carcinoma in her hip.  Finished radiation for now for lung cancer, but they will likely not do chemo.  (Her  is also battling lung cancer)      [facility] called with INR of 4.5 and reports patient has been taking 3 mg daily  No other changes.      INR 4.5 is above the therapeutic range of 2-3.   Recommend to hold dose today, then reduce dose to 3 mg daily except 1.5 mg Q Fri  Patient has warfarin 3 mg tablets.   Will continue to monitor and check INR in 1 weeks.   Dosing reminder card given with phone number, appointment date and time.  Return to clinic: [facility] to call in 1 week ~24  Referring physician: Dr. Grady Liu, PharmD 1:09 PM EST 24    For Pharmacy Admin Tracking Only    Intervention Detail: Adherence Monitorin and Dose Adjustment: 1, reason: Therapy Optimization  Total # of Interventions Recommended: 2  Total # of Interventions Accepted: 2  Time Spent (min): 15

## 2024-01-30 ENCOUNTER — HOSPITAL ENCOUNTER (OUTPATIENT)
Dept: MRI IMAGING | Age: 69
Discharge: HOME | End: 2024-01-30
Attending: INTERNAL MEDICINE
Payer: MEDICARE

## 2024-01-30 ENCOUNTER — HOSPITAL ENCOUNTER (OUTPATIENT)
Dept: INTERVENTIONAL RADIOLOGY/VASCULAR | Age: 69
Discharge: HOME | End: 2024-01-30
Payer: MEDICARE

## 2024-01-30 DIAGNOSIS — Z85.3 PERSONAL HISTORY OF BREAST CANCER: ICD-10-CM

## 2024-01-30 PROCEDURE — 72158 MRI LUMBAR SPINE W/O & W/DYE: CPT

## 2024-01-30 PROCEDURE — A9579 GAD-BASE MR CONTRAST NOS,1ML: HCPCS | Performed by: INTERNAL MEDICINE

## 2024-01-30 PROCEDURE — 76937 US GUIDE VASCULAR ACCESS: CPT

## 2024-01-30 PROCEDURE — 6360000004 HC RX CONTRAST MEDICATION: Performed by: INTERNAL MEDICINE

## 2024-01-30 PROCEDURE — C1751 CATH, INF, PER/CENT/MIDLINE: HCPCS

## 2024-01-30 PROCEDURE — 72157 MRI CHEST SPINE W/O & W/DYE: CPT

## 2024-01-30 PROCEDURE — 36410 VNPNXR 3YR/> PHY/QHP DX/THER: CPT

## 2024-01-30 RX ADMIN — GADOTERIDOL 12 ML: 279.3 INJECTION, SOLUTION INTRAVENOUS at 10:40

## 2024-01-30 NOTE — PROGRESS NOTES
Midline removed by this RN. Pt reports no pain or distress, pt tolerated removal well. Pressure applied, bleeding controlled. Dressing applied to site. Pt discharged at this time with no complaints.

## 2024-01-30 NOTE — PROGRESS NOTES
Midline insertion Procedure Note  Lyndsay Roger   Admitted- 1/30/2024  8:08 AM  Admission diagnosis- Personal history of breast cancer [Z85.3]      Attending Physician- No att. providers found  Ordering PhysicianLucas Hoyos  Indication for Insertion: Limited Access    Lab Results   Component Value Date    INR 4.5 01/25/2024    PROTIME 15.0 (H) 09/13/2023     Lab Results   Component Value Date    WBC 6.3 12/12/2023    HGB 11.3 (L) 12/12/2023    HCT 33.4 (L) 12/12/2023     (L) 12/12/2023     Lab Results   Component Value Date    CREATININE 0.7 12/12/2023    BUN 15 12/12/2023    GFR >60 05/14/2013       Catheter Insertion Date- 1/30/2024   Catheter Brand- BARD Provena Midline Cathter  Lot Number- [id number]    Lumen-Single  Expiration date- 12/31/2024    Insertion Site- Right Basilic    [place] Size-  3  Catheter Length- 15 cm  Exposed Catheter Length- 0cm   Easy insertion- Yes  Able to Aspirate Blood- Yes  Easy Flush- Yes    Midline insertion successful- Yes  Ultrasound- yes    Okay To Use Midline- \"Yes  Report called to- MRI    Electronically signed by Shania Wallace, RN, RN on 1/30/2024 at 9:25 AM

## 2024-02-08 ENCOUNTER — TELEPHONE (OUTPATIENT)
Dept: ORTHOPEDIC SURGERY | Age: 69
End: 2024-02-08

## 2024-02-08 ENCOUNTER — ANTI-COAG VISIT (OUTPATIENT)
Dept: PHARMACY | Age: 69
End: 2024-02-08

## 2024-02-08 DIAGNOSIS — I48.0 PAROXYSMAL ATRIAL FIBRILLATION (HCC): Primary | ICD-10-CM

## 2024-02-08 LAB — INTERNATIONAL NORMALIZATION RATIO, POC: 3.2

## 2024-02-08 NOTE — PROGRESS NOTES
Ms. Roger is here for management of anticoagulation for Afib.  PMH also significant for newly diagnosed lung CA , GERD, anxiety, depression, cardiomyopathy, hypothyroid, chronic back pain, hx of breast cancer, pacemaker placement, back surgery, and osteopenia.  Pt verifies dosing regimen.  Pt denies s/s bleeding/bruising/swelling/SOB.   No changes in Rx/OTC/Herbal medications.  Using Vegamour hair serum for [place] loss.  She is on oxycodone for back pain.   Pt is still having a couple servings of greens per week.  Still drinking daily Premier Protein shakes, has 30mcg (25% DV) of vit K in it  Pt does not smoke and rarely drinks EtOH.    She was diagnosed with a carcinoma in her hip.  Finished radiation for now for lung cancer, but they will likely not do chemo.  (Her  is also battling lung cancer)      [facility] called with INR of 3.2    No changes this week per RN    INR 3.2 is slightly above the therapeutic range of 2-3.   Recommend to reduce 3 mg daily except 1.5 mg Q Mon/Fri  Patient has warfarin 3 mg tablets.   Will continue to monitor and check INR in 1 week.   Dosing reminder card given with phone number, appointment date and time.  Return to clinic: [facility] to call in 1 week ~2/15  Referring physician: Dr. Grady Liu, PharmD 1:47 PM EST 24    For Pharmacy Admin Tracking Only    Intervention Detail: Adherence Monitorin and Dose Adjustment: 1, reason: Therapy Optimization  Total # of Interventions Recommended: 2  Total # of Interventions Accepted: 2  Time Spent (min): 10    
[phone]

## 2024-02-08 NOTE — TELEPHONE ENCOUNTER
Medical Facility Question     Facility Name: [facility]  Contact Name: JORJE  Contact Number: [phone]  [facility] or Information[facility].    
no

## 2024-02-17 ENCOUNTER — HOSPITAL ENCOUNTER (OUTPATIENT)
Dept: CT IMAGING | Age: 69
Discharge: HOME | End: 2024-02-17
Attending: RADIOLOGY
Payer: MEDICARE

## 2024-02-17 DIAGNOSIS — Z85.3 PERSONAL HISTORY OF BREAST CANCER: ICD-10-CM

## 2024-02-17 PROCEDURE — 71250 CT THORAX DX C-: CPT

## 2024-02-20 ENCOUNTER — OFFICE VISIT (OUTPATIENT)
Dept: ORTHOPEDIC SURGERY | Age: 69
End: 2024-02-20
Payer: COMMERCIAL

## 2024-02-20 DIAGNOSIS — S52.502A CLOSED FRACTURE OF DISTAL END OF LEFT RADIUS, UNSPECIFIED FRACTURE MORPHOLOGY, INITIAL ENCOUNTER: ICD-10-CM

## 2024-02-20 DIAGNOSIS — I89.0 ACQUIRED LYMPHEDEMA: Primary | ICD-10-CM

## 2024-02-20 PROCEDURE — 1036F TOBACCO NON-USER: CPT | Performed by: ORTHOPAEDIC SURGERY

## 2024-02-20 PROCEDURE — G8420 CALC BMI NORM PARAMETERS: HCPCS | Performed by: ORTHOPAEDIC SURGERY

## 2024-02-20 PROCEDURE — 1090F PRES/ABSN URINE INCON ASSESS: CPT | Performed by: ORTHOPAEDIC SURGERY

## 2024-02-20 PROCEDURE — 99213 OFFICE O/P EST LOW 20 MIN: CPT | Performed by: ORTHOPAEDIC SURGERY

## 2024-02-20 PROCEDURE — 3017F COLORECTAL CA SCREEN DOC REV: CPT | Performed by: ORTHOPAEDIC SURGERY

## 2024-02-20 PROCEDURE — 1123F ACP DISCUSS/DSCN MKR DOCD: CPT | Performed by: ORTHOPAEDIC SURGERY

## 2024-02-20 PROCEDURE — G8400 PT W/DXA NO RESULTS DOC: HCPCS | Performed by: ORTHOPAEDIC SURGERY

## 2024-02-20 PROCEDURE — G8427 DOCREV CUR MEDS BY ELIG CLIN: HCPCS | Performed by: ORTHOPAEDIC SURGERY

## 2024-02-20 PROCEDURE — G8484 FLU IMMUNIZE NO ADMIN: HCPCS | Performed by: ORTHOPAEDIC SURGERY

## 2024-02-20 NOTE — PROGRESS NOTES
DIAGNOSIS:    1- Left distal radius 3-4 severely comminuted parts intra-articular displaced fracture, status post ORIF.  2- Acquired lymphedema.    DATE OF SURGERY:  12/18/2023.    HISTORY OF PRESENT ILLNESS:  Ms. Roger 68 y.o.  female right handed returns today  for 2 months postoperative visit.  The patient denies any significant pain in the left wrist. Rates pain a 3/10 VAS mild, aching, No numbness or tingling sensation. No fever or Chills. She has Acquired lymphedema left UE from breast cancer surgery 13 years ago.    Past Medical History:   Diagnosis Date    Acute DVT (deep venous thrombosis) (HCC) 07/2014    right arm     Allergic rhinitis     Anesthesia complication     PATIENT STATES IS VERY HARD TO PUT UNDER-PATIENT STATES SHE NEVER WENT TO SLEEP FOR  BONE MARROW BIOPSY    Anesthesia complication     PATIENT STATES MOTHER IS HARD TO PUT UNDER ANESTHESIA-    Anxiety     Atrial fibrillation (HCC)     B12 deficiency     Cancer (HCC)     breast cancer    CARDIAC TAMPONADE 06/2014    following insertion ICD/defibrillator - window placed to remove fluid - 7/2014    Cardiomyopathy 01/20/2011    Dr. Grimm  EF=37%. MUGA scan 2/11 - shows Normal EF=55% 4/2012 - shows EF of 25% with severe global hypokinesis    Cataracts     bilateral    CHF (congestive heart failure) ([facility])     Chronic back pain     Dr. Batista - he gives her percocet - monitors.    DDD (degenerative disc disease), lumbosacral 2013    multi - level     Depression     DJD (degenerative joint disease), cervical     bone spurs of neck and spine    GERD (gastroesophageal reflux disease)     H/O cold sores     HX: breast cancer 1992    left - spread to lymph nodes and Tx with left mastectomy - modified radical and chemo tx x 3 agents and radiation and bone marrow transplant.    Hypothyroid 01/21/2014    ICD (implantable cardiac defibrillator) in place 06/10/2014    REMOVED-2021    Lung cancer (HCC)     Neuropathy     left arm and hand from

## 2024-03-01 ENCOUNTER — ANTI-COAG VISIT (OUTPATIENT)
Dept: PHARMACY | Age: 69
End: 2024-03-01

## 2024-03-01 DIAGNOSIS — I48.0 PAROXYSMAL ATRIAL FIBRILLATION (HCC): Primary | ICD-10-CM

## 2024-03-01 LAB — INR BLD: 2

## 2024-03-01 NOTE — PROGRESS NOTES
Ms. Roger is here for management of anticoagulation for Afib.  PMH also significant for newly diagnosed lung CA , GERD, anxiety, depression, cardiomyopathy, hypothyroid, chronic back pain, hx of breast cancer, pacemaker placement, back surgery, and osteopenia.  Pt verifies dosing regimen.  Pt denies s/s bleeding/bruising/swelling/SOB.   No changes in Rx/OTC/Herbal medications.  Using Vegamour hair serum for [place] loss.  She is on oxycodone for back pain.   Pt is still having a couple servings of greens per week.  Still drinking daily Premier Protein shakes, has 30mcg (25% DV) of vit K in it  Pt does not smoke and rarely drinks EtOH.    She was diagnosed with a carcinoma in her hip.  Finished radiation for now for lung cancer, but they will likely not do chemo.  (Her  is also battling lung cancer)    [facility]   No changes this week per RN, very unclear why her INR jumped like this.   Radiation pushed back for 2 weeks.    INR 2.0 is within the therapeutic range of 2-3, after 4 day hold.   Recommend to take 1.5mg daily x 3 days recheck Mon.  Patient has warfarin 3 mg tablets.   Will continue to monitor and check INR in 3 days.  Dosing reminder card given with phone number, appointment date and time.  Return to clinic: [facility] to call 3/4    Referring physician: Dr. Gunnar Davis  Referral date- 2/19/24

## 2024-03-08 ENCOUNTER — ANTI-COAG VISIT (OUTPATIENT)
Dept: PHARMACY | Age: 69
End: 2024-03-08

## 2024-03-08 DIAGNOSIS — I48.0 PAROXYSMAL ATRIAL FIBRILLATION (HCC): Primary | ICD-10-CM

## 2024-03-08 LAB — INR BLD: 2.3

## 2024-03-08 NOTE — PROGRESS NOTES
Ms. Roger is here for management of anticoagulation for Afib.  PMH also significant for newly diagnosed lung CA , GERD, anxiety, depression, cardiomyopathy, hypothyroid, chronic back pain, hx of breast cancer, pacemaker placement, back surgery, and osteopenia.  Pt verifies dosing regimen.  Pt denies s/s bleeding/bruising/swelling/SOB.   No changes in Rx/OTC/Herbal medications.  Using Vegamour hair serum for [place] loss.  She is on oxycodone for back pain.   Pt is still having a couple servings of greens per week.  Still drinking daily Premier Protein shakes, has 30mcg (25% DV) of vit K in it  Pt does not smoke and rarely drinks EtOH.    She was diagnosed with a carcinoma in her hip.  Finished radiation for now for lung cancer, but they will likely not do chemo.  (Her  is also battling lung cancer)    [facility]   Radiation started  3/6    INR 2.3 is within the therapeutic range of 2-3, after 4 day hold then reduced doses.   Recommend to take 1.5 mg Every Mon/Fri and  3mg daily all other days.  Patient has warfarin 3 mg tablets.   Will continue to monitor and check INR in 3 days.  Dosing reminder card given with phone number, appointment date and time.  Return to clinic: [facility] to call 3/11    Referring physician: Dr. Gunnar Davis  Referral date- 2/19/24

## 2024-03-18 ENCOUNTER — ANTI-COAG VISIT (OUTPATIENT)
Dept: PHARMACY | Age: 69
End: 2024-03-18
Payer: COMMERCIAL

## 2024-03-18 DIAGNOSIS — I48.0 PAROXYSMAL ATRIAL FIBRILLATION (HCC): Primary | ICD-10-CM

## 2024-03-18 LAB — INTERNATIONAL NORMALIZATION RATIO, POC: 1.9

## 2024-03-18 PROCEDURE — 99211 OFF/OP EST MAY X REQ PHY/QHP: CPT | Performed by: INTERNAL MEDICINE

## 2024-03-18 PROCEDURE — 85610 PROTHROMBIN TIME: CPT | Performed by: INTERNAL MEDICINE

## 2024-03-18 NOTE — PROGRESS NOTES
Ms. Roger is here for management of anticoagulation for Afib.  PMH also significant for newly diagnosed lung CA , GERD, anxiety, depression, cardiomyopathy, hypothyroid, chronic back pain, hx of breast cancer, pacemaker placement, back surgery, and osteopenia.  Pt verifies dosing regimen.  Pt denies s/s bleeding/bruising/swelling/SOB.   No changes in Rx/OTC/Herbal medications.  Using Vegamour hair serum for [place] loss.  She is on oxycodone for back pain.   Pt is still having a couple servings of greens per week.  Still drinking daily Premier Protein shakes, has 30mcg (25% DV) of vit K in it  Pt does not smoke and rarely drinks EtOH.    She was diagnosed with a carcinoma in her hip.  Back on radiation, as of 3/6.   (Her  is also battling lung cancer)    She is d/c from [facility] and there was some confusion about her dosing, she thinks she took 4.5mg twice last week- so we will work off of this dosing.    INR 1.9 is within the therapeutic range of 2-3, her INR fell some and pt is concerned, so we will increase dose beyond .   Recommend to increase dose to 4.5 mg every Mon/Wed/Fri and  3mg daily all other days.  Patient has warfarin 3 mg tablets.   Will continue to monitor and check INR in 7 days.  Dosing reminder card given with phone number, appointment date and time.  Return to clinic: 3/25 at 100p    Referring physician: Dr. Gunnar Davis  Referral date- 24      For Pharmacy Admin Tracking Only    Intervention Detail: Adherence Monitorin and Dose Adjustment: 1, reason: Therapy Optimization  Total # of Interventions Recommended: 2  Total # of Interventions Accepted: 2  Time Spent (min): 15

## 2024-03-25 ENCOUNTER — ANTI-COAG VISIT (OUTPATIENT)
Dept: PHARMACY | Age: 69
End: 2024-03-25
Payer: COMMERCIAL

## 2024-03-25 DIAGNOSIS — I48.0 PAROXYSMAL ATRIAL FIBRILLATION (HCC): Primary | ICD-10-CM

## 2024-03-25 LAB — INTERNATIONAL NORMALIZATION RATIO, POC: 2.6

## 2024-03-25 PROCEDURE — 99211 OFF/OP EST MAY X REQ PHY/QHP: CPT | Performed by: INTERNAL MEDICINE

## 2024-03-25 PROCEDURE — 85610 PROTHROMBIN TIME: CPT | Performed by: INTERNAL MEDICINE

## 2024-03-25 NOTE — PROGRESS NOTES
Ms. Roger is here for management of anticoagulation for Afib.  PMH also significant for newly diagnosed lung CA , GERD, anxiety, depression, cardiomyopathy, hypothyroid, chronic back pain, hx of breast cancer, pacemaker placement, back surgery, and osteopenia.  Pt verifies dosing regimen.  Pt denies s/s bleeding/bruising/swelling/SOB.   No changes in Rx/OTC/Herbal medications.  Using Vegamour hair serum for [place] loss.  She is on oxycodone for back pain.   Pt is still having a couple servings of greens per week.  Still drinking daily Premier Protein shakes, has 30mcg (25% DV) of vit K in it  Pt does not smoke and rarely drinks EtOH.    She was diagnosed with a carcinoma in her hip.  Back on radiation, today is her 30, and hopefully this is her last treatment for awhile.  (Her  is also battling lung cancer)      INR 2.6 is within the therapeutic range of 2-3.   Recommend to continue 4.5 mg every Mon/Wed/Fri and 3mg daily all other days.  Patient has warfarin 3 mg tablets.   Will continue to monitor and check INR in 7 days.  Dosing reminder card given with phone number, appointment date and time.  Return to clinic:  at 100p    Referring physician: Dr. Gunnar Davis  Referral date- 24      For Pharmacy Admin Tracking Only    Intervention Detail: Adherence Monitorin and Dose Adjustment: 1, reason: Therapy Optimization  Total # of Interventions Recommended: 2  Total # of Interventions Accepted: 2  Time Spent (min): 15

## 2024-04-09 ENCOUNTER — OFFICE VISIT (OUTPATIENT)
Dept: ORTHOPEDIC SURGERY | Age: 69
End: 2024-04-09
Payer: COMMERCIAL

## 2024-04-09 DIAGNOSIS — S60.212A CONTUSION OF LEFT WRIST, INITIAL ENCOUNTER: ICD-10-CM

## 2024-04-09 DIAGNOSIS — S52.502A CLOSED FRACTURE OF DISTAL END OF LEFT RADIUS, UNSPECIFIED FRACTURE MORPHOLOGY, INITIAL ENCOUNTER: Primary | ICD-10-CM

## 2024-04-09 PROCEDURE — 1123F ACP DISCUSS/DSCN MKR DOCD: CPT | Performed by: ORTHOPAEDIC SURGERY

## 2024-04-09 PROCEDURE — G8400 PT W/DXA NO RESULTS DOC: HCPCS | Performed by: ORTHOPAEDIC SURGERY

## 2024-04-09 PROCEDURE — 3017F COLORECTAL CA SCREEN DOC REV: CPT | Performed by: ORTHOPAEDIC SURGERY

## 2024-04-09 PROCEDURE — 1090F PRES/ABSN URINE INCON ASSESS: CPT | Performed by: ORTHOPAEDIC SURGERY

## 2024-04-09 PROCEDURE — 1036F TOBACCO NON-USER: CPT | Performed by: ORTHOPAEDIC SURGERY

## 2024-04-09 PROCEDURE — G8427 DOCREV CUR MEDS BY ELIG CLIN: HCPCS | Performed by: ORTHOPAEDIC SURGERY

## 2024-04-09 PROCEDURE — G8420 CALC BMI NORM PARAMETERS: HCPCS | Performed by: ORTHOPAEDIC SURGERY

## 2024-04-09 PROCEDURE — 99214 OFFICE O/P EST MOD 30 MIN: CPT | Performed by: ORTHOPAEDIC SURGERY

## 2024-04-09 NOTE — PROGRESS NOTES
DIAGNOSIS:    1- Left distal radius 3-4 severely comminuted parts intra-articular displaced fracture, status post ORIF.  2- Acquired lymphedema.  3- Contusion of left wrist/ new fall 4/6/2024.     DATE OF SURGERY:  12/18/2023.    HISTORY OF PRESENT ILLNESS:  Ms. Roger 68 y.o.  female right handed returns today for 4 months postoperative visit. She fell 4/6/224 onto her left wrist. The patient denies any significant pain in the left wrist. Rates pain a 2/10 VAS mild, aching, No numbness or tingling sensation. No fever or Chills. She has Acquired lymphedema left UE from breast cancer surgery 13 years ago.    Past Medical History:   Diagnosis Date    Acute DVT (deep venous thrombosis) ([facility]) 07/2014    right arm     Allergic rhinitis     Anesthesia complication     PATIENT STATES IS VERY HARD TO PUT UNDER-PATIENT STATES SHE NEVER WENT TO SLEEP FOR  BONE MARROW BIOPSY    Anesthesia complication     PATIENT STATES MOTHER IS HARD TO PUT UNDER ANESTHESIA-    Anxiety     Atrial fibrillation (HCC)     B12 deficiency     Cancer (HCC)     breast cancer    CARDIAC TAMPONADE 06/2014    following insertion ICD/defibrillator - window placed to remove fluid - 7/2014    Cardiomyopathy 01/20/2011    Dr. Grimm  EF=37%. MUGA scan 2/11 - shows Normal EF=55% 4/2012 - shows EF of 25% with severe global hypokinesis    Cataracts     bilateral    CHF (congestive heart failure) ([facility])     Chronic back pain     Dr. Batista - he gives her percocet - monitors.    DDD (degenerative disc disease), lumbosacral 2013    multi - level     Depression     DJD (degenerative joint disease), cervical     bone spurs of neck and spine    GERD (gastroesophageal reflux disease)     H/O cold sores     HX: breast cancer 1992    left - spread to lymph nodes and Tx with left mastectomy - modified radical and chemo tx x 3 agents and radiation and bone marrow transplant.    Hypothyroid 01/21/2014    ICD (implantable cardiac defibrillator) in place 06/10/2014

## 2024-04-11 ENCOUNTER — HOSPITAL ENCOUNTER (OUTPATIENT)
Dept: OCCUPATIONAL THERAPY | Age: 69
Setting detail: THERAPIES SERIES
Discharge: HOME | End: 2024-04-11
Payer: COMMERCIAL

## 2024-04-11 PROCEDURE — 97165 OT EVAL LOW COMPLEX 30 MIN: CPT

## 2024-04-11 PROCEDURE — 97530 THERAPEUTIC ACTIVITIES: CPT

## 2024-04-11 PROCEDURE — 97140 MANUAL THERAPY 1/> REGIONS: CPT

## 2024-04-11 PROCEDURE — 97110 THERAPEUTIC EXERCISES: CPT

## 2024-04-11 NOTE — FLOWSHEET NOTE
services:  {MEDICAL NECESSITY DOCUMENTATION:67506}    Prognosis for POC: {good/fair/poor:46866}    Patient requires continued skilled intervention: {YES/NO:86680}      GOALS     *** (cut and paste from eval)    Overall Progression Towards Functional goals/ Treatment Progress Update:  {GOALPROGRESSION:59615}     CHARGE CAPTURE     {CHARGECAPTUREOT:94594}    Charge Justification:  {ChargeJustificationOT:03146}    TREATMENT PLAN   Plan: {OTPOCflow:35125}    Electronically Signed by Maria Esther Reyez OT              Date: 04/11/2024     Note: If patient does not return for scheduled/recommended follow up visits, this note will serve as a discharge from care along with the most recent update on progress.

## 2024-04-11 NOTE — PLAN OF CARE
[facility]- Outpatient Rehabilitation and Therapy [address], [address], [place] [address] office: [phone] fax: [phone]     Occupational Therapy Certification      Dear Delfin Hoyos*,    We had the pleasure of evaluating the following patient for occupational therapy services at our [facility].  A summary of our findings can be found in the initial assessment below.  This includes our plan of care.  If you have any questions or concerns regarding these findings, please do not hesitate to contact me at the office phone number listed above.  Thank you for the referral.     Physician Signature:_______________________________Date:__________________  By signing above (or electronic signature), therapist’s plan is approved by physician       Initial Evaluation   Patient: Lyndsay Roger (68 y.o. female)   Examination Date: 2024   :  1955 MRN: [id number]   Visit #: Visit count could not be calculated. Make sure you are using a visit which is associated with an episode.    Insurance: Payor: MED[employer] M[employer]UAL / Plan: MEDICAL M[employer]UAL  [address] / Product Type: *No Product type* /   Insurance ID: [id number] - (Commercial)  Secondary Insurance (if applicable): MEDICARE   Treatment Diagnosis:  {TxDxOT:83746}   Medical Diagnosis:  Personal history of malignant neoplasm of breast [Z85.3]  Lymphedema, not elsewhere classified [I89.0]   Referring Provider: Delfin Hoyos*  PCP: Ashley Colin MD                                                                  Precautions/ Contra-indications:   Latex allergy:   {YES/NO:}  Pacemaker:     {YES/NO:}  Other: {F/U:65135}    Red Flags:  {RedFlags:29560::\"None\"}    C-SSRS Triggered by Intake questionnaire:   [x] No, Questionnaire did not trigger screening.   [] Yes, Patient intake triggered further evaluation      [] C-SSRS Screening completed  [] PCP notified via Plan of Care  [] Emergency services

## 2024-04-16 ENCOUNTER — HOSPITAL ENCOUNTER (OUTPATIENT)
Dept: OCCUPATIONAL THERAPY | Age: 69
Setting detail: THERAPIES SERIES
Discharge: HOME | End: 2024-04-16
Payer: COMMERCIAL

## 2024-04-16 PROCEDURE — 97110 THERAPEUTIC EXERCISES: CPT

## 2024-04-16 PROCEDURE — 97140 MANUAL THERAPY 1/> REGIONS: CPT

## 2024-04-17 NOTE — FLOWSHEET NOTE
Update:  Patient is progressing as expected towards functional goals listed.      CHARGE CAPTURE     CHARGE GRID   CPT Code (Timed) Minutes # CPT Code (Untimed) #      Therex (71112)  15 1   Eval:LOW (72019 - Typically 20 minutes face-to-face) 1     Ther. Act (66248)     Re-Eval (19003)      Sensory Integration (84220)     Estim Unattended (26950)      Self Care/Home Manage (20389)     Parraffin (24530)      Cognitive Function (12517)     Fluidotherapy (65627)      Cognitive Function (69310): each    additional 15 minutes     Dry Needle 1-2 muscle (14458)      Neuromusc. Re-ed (00200)     Dry Needle 3+ muscle (72297)      Manual (72425) 30 2   VASO (60447)      Aquatic Therex (67483)     Group Therapy (95473)      Iontophoresis (01253)         Ultrasound (83693)           Estim Attended (34781)           Other:     Other:             Total Timed Code Tx Minutes 45        Total Treatment Minutes 45            Charge Justification:  Therapeutic Exercise (14760): Provided verbal/tactile cueing for activities related to strengthening, flexibility, endurance, ROM. Includes review/progression of HEP activities related to strengthening, flexibility, endurance, AROM, proximal shoulder and UE for functional transfers/mobility, self-care, IADLs, and community re-entry  Therapeutic Activity (81517): Provided verbal/tactile cueing for activities related to improving balance, coordination, kinesthetic sense, posture, motor skill, proprioception and motor activation to allow for proper function of core, proximal shoulder and UE with self-care, and ADLs, IADLs, functional mobility, work-related and leisure based activities. Includes review/progression of HEP activities to improve balance, coordination, kinesthetic sense, posture, motor skill, proprioception, proximal shoulder and UE for functional transfers/mobility, self-care, IADLs, and community re-entry  Self-Care/Home Management Training (67859): Provided training and

## 2024-04-18 ENCOUNTER — HOSPITAL ENCOUNTER (OUTPATIENT)
Dept: OCCUPATIONAL THERAPY | Age: 69
Setting detail: THERAPIES SERIES
Discharge: HOME | End: 2024-04-18
Payer: COMMERCIAL

## 2024-04-18 PROCEDURE — 97110 THERAPEUTIC EXERCISES: CPT

## 2024-04-18 PROCEDURE — 97140 MANUAL THERAPY 1/> REGIONS: CPT

## 2024-04-18 NOTE — FLOWSHEET NOTE
Patient stated goal: decrease swelling in left arm  Status:             [] Progressing: [] Met: [] Not Met: [] Adjusted     Therapist goals for Patient:   Short Term Goals: To be achieved in: 2 weeks  Independent in HEP and progression per patient tolerance, in order to progress toward full function and prevent re-injury.               Status: [] Progressing: [] Met: [] Not Met: [] Adjusted  Patient will have a decrease in pain to 4/10 to help facilitate improvement in movement, function, and ADLs as indicated by functional deficits.              Status: [] Progressing: [] Met: [] Not Met: [] Adjusted  Pt will demonstrate independence with skin care.                Status: [] Progressing: [] Met: [] Not Met: [] Adjusted     Long Term Goals: To be achieved in: 4-6 weeks  Disability index score of 60% or less for the LLIS to assist with return top prior level of function.                  Status: [] Progressing: [] Met: [] Not Met: [] Adjusted  Improve L elbow AAROM to functional range  to allow for proper joint functioning as indicated by patients functional deficits.  Status: [] Progressing: [] Met: [] Not Met: [] Adjusted  Pt to improve strength to show motor control/activation of biceps, triceps, wrist flexors, wrist extensors, and  strength to facilitate functional mobility and ADLs.    Status: [] Progressing: [] Met: [] Not Met: [] Adjusted  Patient will return to  Dressing without increased symptoms or restriction to work towards return to prior level of function.                              Status: [] Progressing: [] Met: [] Not Met: [] Adjusted  Decrease total girth of L UE by 10 cm so that pt can return to functional activities including  Dressing without increased symptoms or restriction.               Status: [] Progressing: [] Met: [] Not Met: [] Adjusted  Pt will demonstrate independence with donning/doffing compression.                                                     Status: [] Progressing:

## 2024-04-22 ENCOUNTER — ANTI-COAG VISIT (OUTPATIENT)
Dept: PHARMACY | Age: 69
End: 2024-04-22
Payer: COMMERCIAL

## 2024-04-22 DIAGNOSIS — I48.0 PAROXYSMAL ATRIAL FIBRILLATION (HCC): Primary | ICD-10-CM

## 2024-04-22 LAB — INTERNATIONAL NORMALIZATION RATIO, POC: 6

## 2024-04-22 PROCEDURE — 99211 OFF/OP EST MAY X REQ PHY/QHP: CPT | Performed by: INTERNAL MEDICINE

## 2024-04-22 PROCEDURE — 85610 PROTHROMBIN TIME: CPT | Performed by: INTERNAL MEDICINE

## 2024-04-22 NOTE — PROGRESS NOTES
Ms. Roger is here for management of anticoagulation for Afib.  PMH also significant for newly diagnosed lung CA , GERD, anxiety, depression, cardiomyopathy, hypothyroid, chronic back pain, hx of breast cancer, pacemaker placement, back surgery, and osteopenia.  Pt verifies dosing regimen.  Pt denies s/s bleeding/bruising/swelling/SOB.   No changes in Rx/OTC/Herbal medications.  Using Vegamour hair serum for [place] loss.  She is on oxycodone for back pain.   Pt is still having a couple servings of greens per week.  Still drinking daily Premier Protein shakes, has 30mcg (25% DV) of vit K in it  Pt does not smoke and rarely drinks EtOH.    She states she has been miserable and not eating.   She was diagnosed with lung cancer, and a carcinoma in her hip.  Finished with radiation.   (Her  is also battling lung cancer)      INR 6.0 is above the therapeutic range of 2-3. Likely due to not eating.  Recommend to hold x 3 days, then take 3mg on Thur, check INR on Friday.  Patient has warfarin 3 mg tablets.   Will continue to monitor and check INR in 4 days.  Dosing reminder card given with phone number, appointment date and time.  Return to clinic:  at 215p    Referring physician: Dr. Gunnar Davis  Referral date- 24      For Pharmacy Admin Tracking Only    Intervention Detail: Adherence Monitorin and Dose Adjustment: 1, reason: Therapy Optimization  Total # of Interventions Recommended: 2  Total # of Interventions Accepted: 2  Time Spent (min): 15

## 2024-04-25 ENCOUNTER — HOSPITAL ENCOUNTER (OUTPATIENT)
Dept: OCCUPATIONAL THERAPY | Age: 69
Setting detail: THERAPIES SERIES
Discharge: HOME | End: 2024-04-25
Payer: COMMERCIAL

## 2024-04-25 PROCEDURE — 97110 THERAPEUTIC EXERCISES: CPT

## 2024-04-25 PROCEDURE — 97140 MANUAL THERAPY 1/> REGIONS: CPT

## 2024-04-25 NOTE — FLOWSHEET NOTE
[facility]- Outpatient Rehabilitation and Therapy [address], [address], [place] [address] office: [phone] fax: [phone]      Occupational Therapy: Treatment/Progress Note   Patient: Lyndsay Roger (68 y.o. female)   Treatment Date: 2024   :  1955 MRN: [id number]   Visit #: 4  Insurance Allowable: BMN  Auth Needed: Yes   Insurance: Payor: MED[employer] M[employer]UAL / Plan: MED[employer] M[employer]UAL [address] / Product Type: *No Product type* /   Insurance ID: [id number] - (Commercial)  Secondary Insurance (if applicable): MEDICARE   Treatment Diagnosis:   L UE lymphedema and impaired strength   Medical Diagnosis:    Personal history of malignant neoplasm of breast [Z85.3]  Lymphedema, not elsewhere classified [I89.0]   Referring Provider: Delfin Hoyos*  PCP: Ashley Colin MD     Plan of care signed: No    Date of Patient follow up with Physician:       Progress Report/POC: NO  POC update due: (OR 10 visits /OR AUTH LIMITS, whichever is less) - 24  Recertification due:                             Precautions/ Contra-indications:   Latex allergy:   No  Pacemaker:     No  Other: lung cancer, receiving radiation     Red Flags:  None    C-SSRS Triggered by Intake questionnaire:   [x] No, Questionnaire did not trigger screening.   [] Yes, Patient intake triggered further evaluation      [] C-SSRS Screening completed  [] PCP notified via Plan of Care  [] Emergency services notified     Preferred Language for Healthcare: English    SUBJECTIVE EXAMINATION     Patient Report/Comments: Patient reports having tests tomorrow for cancer treatment. No new concerns this past week. Continues to have pain and fatigue. Patient is sleeping a lot.     Pain Rating: moderate pain in R shoulder    OBJECTIVE EXAMINATION     Observation: increased edema in L UE.     Test measurements:      Test used Initial Assessment  2024 Progress Note   Pain Summary VAS 8/10          Functional

## 2024-04-26 ENCOUNTER — ANTI-COAG VISIT (OUTPATIENT)
Dept: PHARMACY | Age: 69
End: 2024-04-26
Payer: COMMERCIAL

## 2024-04-26 ENCOUNTER — HOSPITAL ENCOUNTER (OUTPATIENT)
Dept: CT IMAGING | Age: 69
Discharge: HOME | End: 2024-04-26
Attending: RADIOLOGY
Payer: COMMERCIAL

## 2024-04-26 DIAGNOSIS — I48.0 PAROXYSMAL ATRIAL FIBRILLATION (HCC): Primary | ICD-10-CM

## 2024-04-26 DIAGNOSIS — Z85.3 PERSONAL HISTORY OF BREAST CANCER: ICD-10-CM

## 2024-04-26 LAB — INTERNATIONAL NORMALIZATION RATIO, POC: 2.1

## 2024-04-26 PROCEDURE — 85610 PROTHROMBIN TIME: CPT | Performed by: INTERNAL MEDICINE

## 2024-04-26 PROCEDURE — 71250 CT THORAX DX C-: CPT

## 2024-04-26 PROCEDURE — 99211 OFF/OP EST MAY X REQ PHY/QHP: CPT | Performed by: INTERNAL MEDICINE

## 2024-04-26 NOTE — PROGRESS NOTES
Ms. Roger is here for management of anticoagulation for Afib.  PMH also significant for newly diagnosed lung CA , GERD, anxiety, depression, cardiomyopathy, hypothyroid, chronic back pain, hx of breast cancer, pacemaker placement, back surgery, and osteopenia.  Pt verifies dosing regimen.  Pt denies s/s bleeding/bruising/swelling/SOB.   No changes in Rx/OTC/Herbal medications.  Using Vegamour hair serum for georgia loss.  She is on oxycodone for back pain.   Pt is still having a couple servings of greens per week.  Still drinking daily Premier Protein shakes, has 30mcg (25% DV) of vit K in it  Pt does not smoke and rarely drinks EtOH.    She states she has been miserable and not eating.   She was diagnosed with lung cancer, and a carcinoma in her hip.  Finished with radiation.   (Her  is also battling lung cancer)    Still has not appetite, she will try to at least keep up with her protein shakes.     INR 2.1 is within the therapeutic range of 2-3. But this is after a 3 day hold.  Recommend to take warfarin 3mg daily.  Patient has warfarin 3 mg tablets.   Will continue to monitor and check INR in 5 days.  Dosing reminder card given with phone number, appointment date and time.  Return to clinic:  at 1245p    Referring physician: Dr. Gunnar Davis  Referral date- 24      For Pharmacy Admin Tracking Only    Intervention Detail: Adherence Monitorin and Dose Adjustment: 1, reason: Therapy Optimization  Total # of Interventions Recommended: 2  Total # of Interventions Accepted: 2  Time Spent (min): 15

## 2024-05-01 ENCOUNTER — ANTI-COAG VISIT (OUTPATIENT)
Dept: PHARMACY | Age: 69
End: 2024-05-01
Payer: COMMERCIAL

## 2024-05-01 DIAGNOSIS — I48.0 PAROXYSMAL ATRIAL FIBRILLATION (HCC): Primary | ICD-10-CM

## 2024-05-01 LAB — INTERNATIONAL NORMALIZATION RATIO, POC: 2.5

## 2024-05-01 PROCEDURE — 99211 OFF/OP EST MAY X REQ PHY/QHP: CPT | Performed by: INTERNAL MEDICINE

## 2024-05-01 PROCEDURE — 85610 PROTHROMBIN TIME: CPT | Performed by: INTERNAL MEDICINE

## 2024-05-01 NOTE — PROGRESS NOTES
Ms. Roger is here for management of anticoagulation for Afib.  PMH also significant for newly diagnosed lung CA , GERD, anxiety, depression, cardiomyopathy, hypothyroid, chronic back pain, hx of breast cancer, pacemaker placement, back surgery, and osteopenia.  Pt verifies dosing regimen.  Pt denies s/s bleeding/bruising/swelling/SOB.   No changes in Rx/OTC/Herbal medications.  Using Vegamour hair serum for [place] loss.  She is on oxycodone for back pain.   Pt is still having a couple servings of greens per week.  Still drinking daily Premier Protein shakes, has 30mcg (25% DV) of vit K in it  Pt does not smoke and rarely drinks EtOH.    She was diagnosed with lung cancer, and a carcinoma in her hip.  Finished with radiation.   (Her  is also battling lung cancer)    Still has no appetite, she will try to at least keep up with her protein shakes.     INR 2.5 is within the therapeutic range of 2-3.   Recommend to take warfarin 3mg daily.  Patient has warfarin 3 mg tablets.   Will continue to monitor and check INR in 10 days.  Dosing reminder card given with phone number, appointment date and time.  Return to clinic:  at 100p    Referring physician: Dr. Gunnar Davis  No [facility]  Referral date- 24      For Pharmacy Admin Tracking Only    Intervention Detail: Adherence Monitorin and Dose Adjustment: 1, reason: Therapy Optimization  Total # of Interventions Recommended: 2  Total # of Interventions Accepted: 2  Time Spent (min): 15

## 2024-05-02 ENCOUNTER — HOSPITAL ENCOUNTER (OUTPATIENT)
Dept: OCCUPATIONAL THERAPY | Age: 69
Setting detail: THERAPIES SERIES
Discharge: HOME | End: 2024-05-02
Payer: COMMERCIAL

## 2024-05-02 PROCEDURE — 97110 THERAPEUTIC EXERCISES: CPT

## 2024-05-02 PROCEDURE — 97140 MANUAL THERAPY 1/> REGIONS: CPT

## 2024-05-02 NOTE — FLOWSHEET NOTE
[facility]- Outpatient Rehabilitation and Therapy [address], [address], [place] [address] office: [phone] fax: [phone]      Occupational Therapy: Treatment/Progress Note   Patient: Lyndsay Roger (68 y.o. female)   Treatment Date: 2024   :  1955 MRN: [id number]   Visit #: 5  Insurance Allowable: BMN  Auth Needed: Yes   Insurance: Payor: MED[employer] M[employer]UAL / Plan: MEDICAL M[employer]UAL [address] / Product Type: *No Product type* /   Insurance ID: [id number] - (Commercial)  Secondary Insurance (if applicable): MEDICARE   Treatment Diagnosis:   L UE lymphedema and impaired strength   Medical Diagnosis:    Personal history of malignant neoplasm of breast [Z85.3]  Lymphedema, not elsewhere classified [I89.0]   Referring Provider: Delfin Hoyos*  PCP: Ashley Colin MD     Plan of care signed: Yes    Date of Patient follow up with Physician:       Progress Report/POC: NO  POC update due: (OR 10 visits /OR AUTH LIMITS, whichever is less) - 24  Recertification due:                             Precautions/ Contra-indications:   Latex allergy:   No  Pacemaker:     No  Other: lung cancer, receiving radiation     Red Flags:  None    C-SSRS Triggered by Intake questionnaire:   [x] No, Questionnaire did not trigger screening.   [] Yes, Patient intake triggered further evaluation      [] C-SSRS Screening completed  [] PCP notified via Plan of Care  [] Emergency services notified     Preferred Language for Healthcare: English    SUBJECTIVE EXAMINATION     Patient Report/Comments: Patient received compression garment in the mail but has not tried it on.     Pain Rating: moderate pain in R shoulder    OBJECTIVE EXAMINATION     Observation: increased pitting edema in L UE    Test measurements:      Test used Initial Assessment  2024 Progress Note   Pain Summary VAS 8/10          Functional questionnaire LLIS 74% disability

## 2024-05-09 ENCOUNTER — APPOINTMENT (OUTPATIENT)
Dept: OCCUPATIONAL THERAPY | Age: 69
End: 2024-05-09
Payer: COMMERCIAL

## 2024-05-13 ENCOUNTER — ANTI-COAG VISIT (OUTPATIENT)
Dept: PHARMACY | Age: 69
End: 2024-05-13
Payer: COMMERCIAL

## 2024-05-13 DIAGNOSIS — I48.0 PAROXYSMAL ATRIAL FIBRILLATION (HCC): Primary | ICD-10-CM

## 2024-05-13 LAB — INTERNATIONAL NORMALIZATION RATIO, POC: 2.5

## 2024-05-13 PROCEDURE — 85610 PROTHROMBIN TIME: CPT | Performed by: INTERNAL MEDICINE

## 2024-05-13 PROCEDURE — 99211 OFF/OP EST MAY X REQ PHY/QHP: CPT | Performed by: INTERNAL MEDICINE

## 2024-05-13 NOTE — PROGRESS NOTES
Ms. Roger is here for management of anticoagulation for Afib.  PMH also significant for newly diagnosed lung CA , GERD, anxiety, depression, cardiomyopathy, hypothyroid, chronic back pain, hx of breast cancer, pacemaker placement, back surgery, and osteopenia.  Pt verifies dosing regimen.  Pt denies s/s bleeding/bruising/swelling/SOB.   No changes in Rx/OTC/Herbal medications.  Using Vegamour hair serum for [place]ia loss.  She is on oxycodone for back pain.   Pt is still having a couple servings of greens per week.  Still drinking daily Premier Protein shakes, has 30mcg (25% DV) of vit K in it  Pt does not smoke and rarely drinks EtOH.    She was diagnosed with lung cancer, and a carcinoma in her hip.  Finished with radiation.   (Her  is also battling lung cancer)    Still has no appetite, she didn't really drink her shakes, will get back to this. She missed a dose 3 days ago.      INR 2.5 is within the therapeutic range of 2-3. Will leave the same since she missed a dose but didn't drink her protein drinks, hopefully that will cancel each other out, but we will bring back next week to keep an eye on her.   Recommend to take warfarin 3mg daily.  Patient has warfarin 3 mg tablets.   Will continue to monitor and check INR in 10 days.  Dosing reminder card given with phone number, appointment date and time.  Return to clinic:  at 100p    Referring physician: Dr. Gunnar Davis  CPA signed  Referral date- 24      For Pharmacy Admin Tracking Only    Intervention Detail: Adherence Monitorin and Dose Adjustment: 1, reason: Therapy Optimization  Total # of Interventions Recommended: 2  Total # of Interventions Accepted: 2  Time Spent (min): 15

## 2024-05-14 ENCOUNTER — HOSPITAL ENCOUNTER (OUTPATIENT)
Dept: OCCUPATIONAL THERAPY | Age: 69
Setting detail: THERAPIES SERIES
Discharge: HOME | End: 2024-05-14
Payer: COMMERCIAL

## 2024-05-14 PROCEDURE — 97112 NEUROMUSCULAR REEDUCATION: CPT

## 2024-05-14 PROCEDURE — 97110 THERAPEUTIC EXERCISES: CPT

## 2024-05-14 PROCEDURE — 97140 MANUAL THERAPY 1/> REGIONS: CPT

## 2024-05-14 NOTE — FLOWSHEET NOTE
[facility]- Outpatient Rehabilitation and Therapy [address], [address], [place] [address] office: [phone] fax: [phone]      Occupational Therapy: Treatment/Progress Note   Patient: Lyndsay Roger (68 y.o. female)   Treatment Date: 2024   :  1955 MRN: [id number]   Visit #: 6  Insurance Allowable: BMN  Auth Needed: Yes   Insurance: Payor: MEDICAL M[employer]UAL / Plan: MEDICAL MUTUAL [address] / Product Type: *No Product type* /   Insurance ID: [id number] - (Commercial)  Secondary Insurance (if applicable): MEDICARE   Treatment Diagnosis:   L UE lymphedema and impaired strength   Medical Diagnosis:    Personal history of malignant neoplasm of breast [Z85.3]  Lymphedema, not elsewhere classified [I89.0]   Referring Provider: Delfin Hoyos*  PCP: Ashley Colin MD     Plan of care signed: Yes    Date of Patient follow up with Physician:       Progress Report/POC: NO  POC update due: (OR 10 visits /OR AUTH LIMITS, whichever is less) - 24  Recertification due:                             Precautions/ Contra-indications:   Latex allergy:   No  Pacemaker:     No  Other: lung cancer, receiving radiation     Red Flags:  None    C-SSRS Triggered by Intake questionnaire:   [x] No, Questionnaire did not trigger screening.   [] Yes, Patient intake triggered further evaluation      [] C-SSRS Screening completed  [] PCP notified via Plan of Care  [] Emergency services notified     Preferred Language for Healthcare: English    SUBJECTIVE EXAMINATION     Patient Report/Comments: Patient tolerated wrap well after last visit.  unable to don compression garment.     Pain Rating: moderate pain in R shoulder    OBJECTIVE EXAMINATION     Observation: less edema in L UE compared to previous session    Test measurements:      Test used Initial Assessment  2024 Progress Note   Pain Summary VAS 8/10          Functional questionnaire LLIS 74% disability

## 2024-05-16 ENCOUNTER — APPOINTMENT (OUTPATIENT)
Dept: OCCUPATIONAL THERAPY | Age: 69
End: 2024-05-16
Payer: COMMERCIAL

## 2024-05-20 ENCOUNTER — ANTI-COAG VISIT (OUTPATIENT)
Dept: PHARMACY | Age: 69
End: 2024-05-20
Payer: COMMERCIAL

## 2024-05-20 DIAGNOSIS — I48.0 PAROXYSMAL ATRIAL FIBRILLATION (HCC): Primary | ICD-10-CM

## 2024-05-20 LAB — INTERNATIONAL NORMALIZATION RATIO, POC: 3.9

## 2024-05-20 PROCEDURE — 85610 PROTHROMBIN TIME: CPT

## 2024-05-20 PROCEDURE — 99211 OFF/OP EST MAY X REQ PHY/QHP: CPT

## 2024-05-20 NOTE — PROGRESS NOTES
Ms. Roger is here for management of anticoagulation for Afib.  PMH also significant for newly diagnosed lung CA , GERD, anxiety, depression, cardiomyopathy, hypothyroid, chronic back pain, hx of breast cancer, pacemaker placement, back surgery, and osteopenia.  Pt verifies dosing regimen.  Pt denies s/s bleeding/bruising/swelling/SOB.   No changes in Rx/OTC/Herbal medications.  Using Vegamour hair serum for georgia loss.  She is on oxycodone for back pain.   Pt is still having a couple servings of greens per week.  Still drinking daily Premier Protein shakes, has 30mcg (25% DV) of vit K in it  Pt does not smoke and rarely drinks EtOH.    She was diagnosed with lung cancer, and a carcinoma in her hip.  Finished with radiation.   (Her  is also battling lung cancer)    INR is elevated today, will reduce dose and recheck next week.    INR 3.9 is above the therapeutic range of 2-3  Recommend to hold today dose and reducing warfarin dosing to 3 mg daily except 1.5 mg on Friday.  Patient has warfarin 3 mg tablets.   Will continue to monitor and check INR in 10 days.  Dosing reminder card given with phone number, appointment date and time.  Return to clinic:  at 100p    Referring physician: Dr. Gunnar Davis  CPA signed  Referral date- 24    Josselyn Antonio, PharmD  2024 at 4:12 PM    For Pharmacy Admin Tracking Only    Intervention Detail: Adherence Monitorin and Dose Adjustment: 1, reason: Therapy Optimization  Total # of Interventions Recommended: 2  Total # of Interventions Accepted: 2  Time Spent (min): 15

## 2024-05-23 ENCOUNTER — APPOINTMENT (OUTPATIENT)
Dept: OCCUPATIONAL THERAPY | Age: 69
End: 2024-05-23
Payer: COMMERCIAL

## 2024-05-30 ENCOUNTER — HOSPITAL ENCOUNTER (OUTPATIENT)
Dept: OCCUPATIONAL THERAPY | Age: 69
Setting detail: THERAPIES SERIES
End: 2024-05-30
Payer: COMMERCIAL

## 2024-06-07 ENCOUNTER — HOSPITAL ENCOUNTER (OUTPATIENT)
Dept: CT IMAGING | Age: 69
Discharge: HOME | End: 2024-06-07
Attending: RADIOLOGY
Payer: COMMERCIAL

## 2024-06-07 DIAGNOSIS — Z85.3 PERSONAL HISTORY OF MALIGNANT NEOPLASM OF BREAST: ICD-10-CM

## 2024-06-07 PROCEDURE — 74176 CT ABD & PELVIS W/O CONTRAST: CPT

## 2024-06-11 ENCOUNTER — HOSPITAL ENCOUNTER (OUTPATIENT)
Dept: OCCUPATIONAL THERAPY | Age: 69
Setting detail: THERAPIES SERIES
Discharge: HOME | End: 2024-06-11

## 2024-06-15 ENCOUNTER — HOSPITAL ENCOUNTER (INPATIENT)
Age: 69
LOS: 3 days | Discharge: NURSING, REHAB OR LONG-TERM CARE | DRG: 309 | End: 2024-06-19
Attending: STUDENT IN AN ORGANIZED HEALTH CARE EDUCATION/TRAINING PROGRAM | Admitting: INTERNAL MEDICINE
Payer: MEDICARE

## 2024-06-15 ENCOUNTER — APPOINTMENT (OUTPATIENT)
Dept: CT IMAGING | Age: 69
DRG: 309 | End: 2024-06-15
Payer: MEDICARE

## 2024-06-15 DIAGNOSIS — I48.91 ATRIAL FIBRILLATION WITH RVR (HCC): Primary | ICD-10-CM

## 2024-06-15 DIAGNOSIS — I50.9 ACUTE ON CHRONIC CONGESTIVE HEART FAILURE, UNSPECIFIED HEART FAILURE TYPE (HCC): ICD-10-CM

## 2024-06-15 DIAGNOSIS — Z86.79 HX OF HEART FAILURE: ICD-10-CM

## 2024-06-15 DIAGNOSIS — R63.8 POOR FLUID INTAKE: ICD-10-CM

## 2024-06-15 DIAGNOSIS — G89.29 CHRONIC MIDLINE LOW BACK PAIN WITHOUT SCIATICA: ICD-10-CM

## 2024-06-15 DIAGNOSIS — J90 PLEURAL EFFUSION: ICD-10-CM

## 2024-06-15 DIAGNOSIS — M54.50 CHRONIC MIDLINE LOW BACK PAIN WITHOUT SCIATICA: ICD-10-CM

## 2024-06-15 DIAGNOSIS — I48.21 PERMANENT ATRIAL FIBRILLATION (HCC): ICD-10-CM

## 2024-06-15 LAB
ALBUMIN SERPL-MCNC: 3.5 G/DL (ref 3.4–5)
ALBUMIN/GLOB SERPL: 1.4 {RATIO} (ref 1.1–2.2)
ALP SERPL-CCNC: 177 U/L (ref 40–129)
ALT SERPL-CCNC: 15 U/L (ref 10–40)
ANION GAP SERPL CALCULATED.3IONS-SCNC: 14 MMOL/L (ref 3–16)
AST SERPL-CCNC: 37 U/L (ref 15–37)
BASOPHILS # BLD: 0 K/UL (ref 0–0.2)
BASOPHILS NFR BLD: 0.5 %
BILIRUB SERPL-MCNC: 1.6 MG/DL (ref 0–1)
BUN SERPL-MCNC: 31 MG/DL (ref 7–20)
CALCIUM SERPL-MCNC: 8.6 MG/DL (ref 8.3–10.6)
CHLORIDE SERPL-SCNC: 95 MMOL/L (ref 99–110)
CO2 SERPL-SCNC: 25 MMOL/L (ref 21–32)
CREAT SERPL-MCNC: 0.6 MG/DL (ref 0.6–1.2)
D-DIMER QUANTITATIVE: >20 UG/ML FEU (ref 0–0.6)
DEPRECATED RDW RBC AUTO: 17.8 % (ref 12.4–15.4)
EOSINOPHIL # BLD: 0 K/UL (ref 0–0.6)
EOSINOPHIL NFR BLD: 0.1 %
GFR SERPLBLD CREATININE-BSD FMLA CKD-EPI: >90 ML/MIN/{1.73_M2}
GLUCOSE SERPL-MCNC: 100 MG/DL (ref 70–99)
HCT VFR BLD AUTO: 32.3 % (ref 36–48)
HGB BLD-MCNC: 10.5 G/DL (ref 12–16)
INR PPP: 3.37 (ref 0.85–1.15)
LACTATE BLDV-SCNC: 1.3 MMOL/L (ref 0.4–2)
LYMPHOCYTES # BLD: 0.4 K/UL (ref 1–5.1)
LYMPHOCYTES NFR BLD: 5.1 %
MCH RBC QN AUTO: 33.4 PG (ref 26–34)
MCHC RBC AUTO-ENTMCNC: 32.7 G/DL (ref 31–36)
MCV RBC AUTO: 102.1 FL (ref 80–100)
MONOCYTES # BLD: 0.7 K/UL (ref 0–1.3)
MONOCYTES NFR BLD: 8.6 %
NEUTROPHILS # BLD: 7.4 K/UL (ref 1.7–7.7)
NEUTROPHILS NFR BLD: 85.7 %
NT-PROBNP SERPL-MCNC: ABNORMAL PG/ML (ref 0–124)
PLATELET # BLD AUTO: 94 K/UL (ref 135–450)
PLATELET BLD QL SMEAR: ABNORMAL
PMV BLD AUTO: 7.7 FL (ref 5–10.5)
POTASSIUM SERPL-SCNC: 3.6 MMOL/L (ref 3.5–5.1)
PROT SERPL-MCNC: 6 G/DL (ref 6.4–8.2)
PROTHROMBIN TIME: 33.9 SEC (ref 11.9–14.9)
RBC # BLD AUTO: 3.16 M/UL (ref 4–5.2)
SLIDE REVIEW: ABNORMAL
SODIUM SERPL-SCNC: 134 MMOL/L (ref 136–145)
TROPONIN, HIGH SENSITIVITY: 51 NG/L (ref 0–14)
TROPONIN, HIGH SENSITIVITY: 52 NG/L (ref 0–14)
WBC # BLD AUTO: 8.7 K/UL (ref 4–11)

## 2024-06-15 PROCEDURE — 83880 ASSAY OF NATRIURETIC PEPTIDE: CPT

## 2024-06-15 PROCEDURE — 93005 ELECTROCARDIOGRAM TRACING: CPT | Performed by: STUDENT IN AN ORGANIZED HEALTH CARE EDUCATION/TRAINING PROGRAM

## 2024-06-15 PROCEDURE — 80053 COMPREHEN METABOLIC PANEL: CPT

## 2024-06-15 PROCEDURE — 74177 CT ABD & PELVIS W/CONTRAST: CPT

## 2024-06-15 PROCEDURE — 6360000002 HC RX W HCPCS: Performed by: STUDENT IN AN ORGANIZED HEALTH CARE EDUCATION/TRAINING PROGRAM

## 2024-06-15 PROCEDURE — 85025 COMPLETE CBC W/AUTO DIFF WBC: CPT

## 2024-06-15 PROCEDURE — 2580000003 HC RX 258: Performed by: PHYSICIAN ASSISTANT

## 2024-06-15 PROCEDURE — 71260 CT THORAX DX C+: CPT

## 2024-06-15 PROCEDURE — 6360000004 HC RX CONTRAST MEDICATION: Performed by: PHYSICIAN ASSISTANT

## 2024-06-15 PROCEDURE — 85610 PROTHROMBIN TIME: CPT

## 2024-06-15 PROCEDURE — 83605 ASSAY OF LACTIC ACID: CPT

## 2024-06-15 PROCEDURE — 99285 EMERGENCY DEPT VISIT HI MDM: CPT

## 2024-06-15 PROCEDURE — 96374 THER/PROPH/DIAG INJ IV PUSH: CPT

## 2024-06-15 PROCEDURE — 84484 ASSAY OF TROPONIN QUANT: CPT

## 2024-06-15 PROCEDURE — 96361 HYDRATE IV INFUSION ADD-ON: CPT

## 2024-06-15 PROCEDURE — 85379 FIBRIN DEGRADATION QUANT: CPT

## 2024-06-15 RX ORDER — DIGOXIN 0.25 MG/ML
250 INJECTION INTRAMUSCULAR; INTRAVENOUS ONCE
Status: COMPLETED | OUTPATIENT
Start: 2024-06-15 | End: 2024-06-15

## 2024-06-15 RX ORDER — 0.9 % SODIUM CHLORIDE 0.9 %
1000 INTRAVENOUS SOLUTION INTRAVENOUS ONCE
Status: COMPLETED | OUTPATIENT
Start: 2024-06-15 | End: 2024-06-15

## 2024-06-15 RX ADMIN — IOPAMIDOL 75 ML: 755 INJECTION, SOLUTION INTRAVENOUS at 22:38

## 2024-06-15 RX ADMIN — DIGOXIN 250 MCG: 0.25 INJECTION INTRAMUSCULAR; INTRAVENOUS at 21:54

## 2024-06-15 RX ADMIN — SODIUM CHLORIDE 1000 ML: 9 INJECTION, SOLUTION INTRAVENOUS at 20:25

## 2024-06-15 ASSESSMENT — PAIN SCALES - GENERAL
PAINLEVEL_OUTOF10: 5
PAINLEVEL_OUTOF10: 7

## 2024-06-15 ASSESSMENT — PAIN - FUNCTIONAL ASSESSMENT: PAIN_FUNCTIONAL_ASSESSMENT: 0-10

## 2024-06-15 ASSESSMENT — LIFESTYLE VARIABLES
HOW OFTEN DO YOU HAVE A DRINK CONTAINING ALCOHOL: NEVER
HOW MANY STANDARD DRINKS CONTAINING ALCOHOL DO YOU HAVE ON A TYPICAL DAY: PATIENT DOES NOT DRINK

## 2024-06-16 PROBLEM — I48.91 ATRIAL FIBRILLATION WITH RVR (HCC): Status: ACTIVE | Noted: 2024-06-16

## 2024-06-16 LAB
EKG DIAGNOSIS: NORMAL
EKG DIAGNOSIS: NORMAL
EKG Q-T INTERVAL: 338 MS
EKG Q-T INTERVAL: 344 MS
EKG QRS DURATION: 132 MS
EKG QRS DURATION: 132 MS
EKG QTC CALCULATION (BAZETT): 461 MS
EKG QTC CALCULATION (BAZETT): 526 MS
EKG R AXIS: -64 DEGREES
EKG R AXIS: -69 DEGREES
EKG T AXIS: 118 DEGREES
EKG T AXIS: 140 DEGREES
EKG VENTRICULAR RATE: 112 BPM
EKG VENTRICULAR RATE: 141 BPM
INR PPP: 3.77 (ref 0.85–1.15)
PROTHROMBIN TIME: 36.9 SEC (ref 11.9–14.9)
T4 FREE SERPL-MCNC: 1.9 NG/DL (ref 0.9–1.8)
TROPONIN, HIGH SENSITIVITY: 61 NG/L (ref 0–14)
TSH SERPL DL<=0.005 MIU/L-ACNC: 4.82 UIU/ML (ref 0.27–4.2)

## 2024-06-16 PROCEDURE — 6360000002 HC RX W HCPCS: Performed by: PHYSICIAN ASSISTANT

## 2024-06-16 PROCEDURE — 94761 N-INVAS EAR/PLS OXIMETRY MLT: CPT

## 2024-06-16 PROCEDURE — 93010 ELECTROCARDIOGRAM REPORT: CPT | Performed by: INTERNAL MEDICINE

## 2024-06-16 PROCEDURE — 93005 ELECTROCARDIOGRAM TRACING: CPT

## 2024-06-16 PROCEDURE — 6370000000 HC RX 637 (ALT 250 FOR IP): Performed by: STUDENT IN AN ORGANIZED HEALTH CARE EDUCATION/TRAINING PROGRAM

## 2024-06-16 PROCEDURE — 36415 COLL VENOUS BLD VENIPUNCTURE: CPT

## 2024-06-16 PROCEDURE — 99255 IP/OBS CONSLTJ NEW/EST HI 80: CPT | Performed by: INTERNAL MEDICINE

## 2024-06-16 PROCEDURE — 6370000000 HC RX 637 (ALT 250 FOR IP): Performed by: NURSE PRACTITIONER

## 2024-06-16 PROCEDURE — 93005 ELECTROCARDIOGRAM TRACING: CPT | Performed by: INTERNAL MEDICINE

## 2024-06-16 PROCEDURE — 1200000000 HC SEMI PRIVATE

## 2024-06-16 PROCEDURE — 85610 PROTHROMBIN TIME: CPT

## 2024-06-16 PROCEDURE — 84443 ASSAY THYROID STIM HORMONE: CPT

## 2024-06-16 PROCEDURE — 2700000000 HC OXYGEN THERAPY PER DAY

## 2024-06-16 PROCEDURE — 84484 ASSAY OF TROPONIN QUANT: CPT

## 2024-06-16 PROCEDURE — 84439 ASSAY OF FREE THYROXINE: CPT

## 2024-06-16 PROCEDURE — 2580000003 HC RX 258: Performed by: NURSE PRACTITIONER

## 2024-06-16 RX ORDER — MORPHINE SULFATE 15 MG/1
30 TABLET ORAL 2 TIMES DAILY
Status: DISCONTINUED | OUTPATIENT
Start: 2024-06-16 | End: 2024-06-17

## 2024-06-16 RX ORDER — SODIUM CHLORIDE 9 MG/ML
INJECTION, SOLUTION INTRAVENOUS PRN
Status: DISCONTINUED | OUTPATIENT
Start: 2024-06-16 | End: 2024-06-19 | Stop reason: HOSPADM

## 2024-06-16 RX ORDER — SODIUM CHLORIDE 0.9 % (FLUSH) 0.9 %
5-40 SYRINGE (ML) INJECTION EVERY 12 HOURS SCHEDULED
Status: DISCONTINUED | OUTPATIENT
Start: 2024-06-16 | End: 2024-06-19 | Stop reason: HOSPADM

## 2024-06-16 RX ORDER — ACETAMINOPHEN 325 MG/1
650 TABLET ORAL EVERY 6 HOURS PRN
Status: DISCONTINUED | OUTPATIENT
Start: 2024-06-16 | End: 2024-06-19 | Stop reason: HOSPADM

## 2024-06-16 RX ORDER — CLONAZEPAM 1 MG/1
2 TABLET ORAL NIGHTLY
Status: DISCONTINUED | OUTPATIENT
Start: 2024-06-16 | End: 2024-06-19 | Stop reason: HOSPADM

## 2024-06-16 RX ORDER — WARFARIN SODIUM 1 MG/1
1 TABLET ORAL
Status: COMPLETED | OUTPATIENT
Start: 2024-06-16 | End: 2024-06-16

## 2024-06-16 RX ORDER — POLYETHYLENE GLYCOL 3350 17 G/17G
17 POWDER, FOR SOLUTION ORAL DAILY PRN
Status: DISCONTINUED | OUTPATIENT
Start: 2024-06-16 | End: 2024-06-19 | Stop reason: HOSPADM

## 2024-06-16 RX ORDER — OXYCODONE HYDROCHLORIDE 5 MG/1
5 TABLET ORAL EVERY 4 HOURS PRN
Status: DISCONTINUED | OUTPATIENT
Start: 2024-06-16 | End: 2024-06-19 | Stop reason: HOSPADM

## 2024-06-16 RX ORDER — ACETAMINOPHEN 650 MG/1
650 SUPPOSITORY RECTAL EVERY 6 HOURS PRN
Status: DISCONTINUED | OUTPATIENT
Start: 2024-06-16 | End: 2024-06-19 | Stop reason: HOSPADM

## 2024-06-16 RX ORDER — ONDANSETRON 4 MG/1
4 TABLET, ORALLY DISINTEGRATING ORAL EVERY 8 HOURS PRN
Status: DISCONTINUED | OUTPATIENT
Start: 2024-06-16 | End: 2024-06-19 | Stop reason: HOSPADM

## 2024-06-16 RX ORDER — FUROSEMIDE 20 MG/1
20 TABLET ORAL DAILY
Status: DISCONTINUED | OUTPATIENT
Start: 2024-06-16 | End: 2024-06-19 | Stop reason: HOSPADM

## 2024-06-16 RX ORDER — LEVOTHYROXINE SODIUM 0.05 MG/1
50 TABLET ORAL DAILY
COMMUNITY
Start: 2024-04-06

## 2024-06-16 RX ORDER — SODIUM CHLORIDE 0.9 % (FLUSH) 0.9 %
5-40 SYRINGE (ML) INJECTION PRN
Status: DISCONTINUED | OUTPATIENT
Start: 2024-06-16 | End: 2024-06-19 | Stop reason: HOSPADM

## 2024-06-16 RX ORDER — LEVOTHYROXINE SODIUM 0.05 MG/1
50 TABLET ORAL DAILY
Status: DISCONTINUED | OUTPATIENT
Start: 2024-06-16 | End: 2024-06-19 | Stop reason: HOSPADM

## 2024-06-16 RX ORDER — METOPROLOL SUCCINATE 25 MG/1
25 TABLET, EXTENDED RELEASE ORAL DAILY
Status: DISCONTINUED | OUTPATIENT
Start: 2024-06-16 | End: 2024-06-19 | Stop reason: HOSPADM

## 2024-06-16 RX ORDER — FOLIC ACID 1 MG/1
1000 TABLET ORAL DAILY
Status: DISCONTINUED | OUTPATIENT
Start: 2024-06-16 | End: 2024-06-19 | Stop reason: HOSPADM

## 2024-06-16 RX ORDER — ONDANSETRON 2 MG/ML
4 INJECTION INTRAMUSCULAR; INTRAVENOUS EVERY 6 HOURS PRN
Status: DISCONTINUED | OUTPATIENT
Start: 2024-06-16 | End: 2024-06-19 | Stop reason: HOSPADM

## 2024-06-16 RX ORDER — DIGOXIN 0.25 MG/ML
250 INJECTION INTRAMUSCULAR; INTRAVENOUS ONCE
Status: COMPLETED | OUTPATIENT
Start: 2024-06-16 | End: 2024-06-16

## 2024-06-16 RX ORDER — OXYCODONE HYDROCHLORIDE 5 MG/1
5 CAPSULE ORAL EVERY 4 HOURS PRN
Status: DISCONTINUED | OUTPATIENT
Start: 2024-06-16 | End: 2024-06-16 | Stop reason: SDUPTHER

## 2024-06-16 RX ORDER — SPIRONOLACTONE 25 MG/1
25 TABLET ORAL DAILY
Status: DISCONTINUED | OUTPATIENT
Start: 2024-06-16 | End: 2024-06-19 | Stop reason: HOSPADM

## 2024-06-16 RX ORDER — CARVEDILOL 3.12 MG/1
3.12 TABLET ORAL 2 TIMES DAILY WITH MEALS
Status: DISCONTINUED | OUTPATIENT
Start: 2024-06-16 | End: 2024-06-16

## 2024-06-16 RX ADMIN — CLONAZEPAM 2 MG: 1 TABLET ORAL at 22:07

## 2024-06-16 RX ADMIN — SPIRONOLACTONE 25 MG: 25 TABLET ORAL at 08:02

## 2024-06-16 RX ADMIN — OXYCODONE HYDROCHLORIDE 5 MG: 5 TABLET ORAL at 03:46

## 2024-06-16 RX ADMIN — SACUBITRIL AND VALSARTAN 1 TABLET: 24; 26 TABLET, FILM COATED ORAL at 22:07

## 2024-06-16 RX ADMIN — FOLIC ACID 1000 MCG: 1 TABLET ORAL at 08:04

## 2024-06-16 RX ADMIN — FUROSEMIDE 20 MG: 20 TABLET ORAL at 08:04

## 2024-06-16 RX ADMIN — DIGOXIN 250 MCG: 0.25 INJECTION INTRAMUSCULAR; INTRAVENOUS at 01:59

## 2024-06-16 RX ADMIN — MORPHINE SULFATE 30 MG: 15 TABLET ORAL at 18:16

## 2024-06-16 RX ADMIN — MORPHINE SULFATE 30 MG: 15 TABLET ORAL at 08:02

## 2024-06-16 RX ADMIN — CARVEDILOL 3.12 MG: 3.12 TABLET, FILM COATED ORAL at 08:03

## 2024-06-16 RX ADMIN — LEVOTHYROXINE SODIUM 50 MCG: 0.05 TABLET ORAL at 08:04

## 2024-06-16 RX ADMIN — WARFARIN SODIUM 1 MG: 1 TABLET ORAL at 18:16

## 2024-06-16 RX ADMIN — Medication 10 ML: at 08:05

## 2024-06-16 RX ADMIN — Medication 10 ML: at 22:07

## 2024-06-16 ASSESSMENT — PAIN SCALES - GENERAL
PAINLEVEL_OUTOF10: 6
PAINLEVEL_OUTOF10: 10
PAINLEVEL_OUTOF10: 10
PAINLEVEL_OUTOF10: 6
PAINLEVEL_OUTOF10: 5

## 2024-06-16 ASSESSMENT — PAIN DESCRIPTION - LOCATION
LOCATION: KNEE;BACK
LOCATION: BACK

## 2024-06-16 ASSESSMENT — PAIN DESCRIPTION - PAIN TYPE: TYPE: CHRONIC PAIN

## 2024-06-16 NOTE — PLAN OF CARE
Problem: Chronic Conditions and Co-morbidities  Goal: Patient's chronic conditions and co-morbidity symptoms are monitored and maintained or improved  Outcome: Progressing     Problem: Safety - Adult  Goal: Free from fall injury  Outcome: Progressing     Problem: Pain  Goal: Verbalizes/displays adequate comfort level or baseline comfort level  Outcome: Progressing      CHF Care Plan      Patient's EF (Ejection Fraction) is less than 40%    Heart Failure Medications:  Diuretics:: Furosemide and Spironolactone    (One of the following REQUIRED for EF </= 40%/SYSTOLIC FAILURE but MAY be used in EF% >40%/DIASTOLIC FAILURE)        ACE:: None        ARB:: None         ARNI:: Sacubitril/Valsartan-Entresto    (Beta Blockers)  NON- Evidenced Based Beta Blocker (for EF% >40%/DIASTOLIC FAILURE): None    Evidenced Based Beta Blocker::(REQUIRED for EF% <40%/SYSTOLIC FAILURE) Carvedilol- Coreg  ...................................................................................................................................................    Failed to redirect to the Timeline version of the [employer].      Patient's weights and intake/output reviewed    Daily Weight log at bedside, patient/family participation in use of log: \"yes    Patient's current weight today  was an admission weight.      No intake or output data in the 24 hours ending 06/16/24 0512    Education Booklet Provided: yes    Comorbidities Reviewed Yes    Patient has a past medical history of Acute DVT (deep venous thrombosis) ([facility]), Allergic rhinitis, Anesthesia complication, Anesthesia complication, Anxiety, Atrial fibrillation (HCC), B12 deficiency, Cancer (HCC), CARDIAC TAMPONADE, Cardiomyopathy, Cataracts, CHF (congestive heart failure) ([facility]), Chronic back pain, DDD (degenerative disc disease), lumbosacral, Depression, DJD (degenerative joint disease), cervical, GERD (gastroesophageal reflux disease), H/O cold sores, HX: breast cancer, Hypothyroid,

## 2024-06-16 NOTE — H&P
04/2004    arteries ok - EF - 30% see report, Dr. Grimm     Past Surgical History:        Procedure Laterality Date    BACK SURGERY  06/19/2007    BONE MARROW BIOPSY N/A     BONE MARROW TRANSPLANT N/A 01/01/1993    BREAST RECONSTRUCTION  01/01/1998    CARDIAC SURGERY  07/01/2014    had window placed to drain pericardial effusion/tamponade    CARPAL TUNNEL RELEASE  01/01/2000    right    CATARACT REMOVAL  01/01/1996    bilateral    CERVICAL SPINE SURGERY  06/01/2009    Dr. Caceres    CHOLECYSTECTOMY  01/01/2008    lap    COLONOSCOPY  12/29/2010    CT BIOPSY PERCUTANEOUS DEEP BONE  09/15/2023    CT BIOPSY PERCUTANEOUS DEEP BONE 9/15/2023 [facility] CT SCAN    HAND SURGERY  10/01/2011    tendon transfer to thumb on left hand    HYSTERECTOMY (CERVIX STATUS UNKNOWN)  01/01/1984    total    IR MIDLINE CATH  09/13/2023    IR MIDLINE CATH 9/13/2023 [facility] PROCEDURES    IR MIDLINE CATH  1/30/2024    IR MIDLINE CATH 1/30/2024 [facility] PROCEDURES    MASTECTOMY, MODIFIED RADICAL  01/01/1993    left    SHOULDER ARTHROPLASTY Right     SHOULDER SURGERY Right 04/04/2023    TOTAL KNEE ARTHROPLASTY Bilateral     UPPER GASTROINTESTINAL ENDOSCOPY  12/29/2010    WRIST FRACTURE SURGERY Left 12/18/2023    LEFT OPEN REDUCTION INTERNAL FIXATION WRIST performed by Chanel Francisco MD at [facility] OR     Medications Prior to Admission:   Prior to Admission medications    Medication Sig Start Date End Date Taking? Authorizing Provider   sacubitril-valsartan (ENTRESTO) 24-26 MG per tablet Take 1 tablet by mouth 2 times daily    Consuelo Purcell MD   empagliflozin (JARDIANCE) 10 MG tablet Take 1 tablet by mouth at bedtime    ProviderConsuelo MD   morphine (MSIR) 30 MG tablet Take 1 tablet by mouth in the morning and 1 tablet in the evening.    Consuelo Purcell MD   oxyCODONE 5 MG capsule Take 1 capsule by mouth every 4 hours as needed for Pain. EVERY 4-6 HOURS PRN    Consuelo Purcell MD   warfarin (COUMADIN) 3 MG tablet Take 3 mg

## 2024-06-17 ENCOUNTER — APPOINTMENT (OUTPATIENT)
Age: 69
DRG: 309 | End: 2024-06-17
Payer: MEDICARE

## 2024-06-17 LAB
ANION GAP SERPL CALCULATED.3IONS-SCNC: 11 MMOL/L (ref 3–16)
BUN SERPL-MCNC: 28 MG/DL (ref 7–20)
CALCIUM SERPL-MCNC: 8.2 MG/DL (ref 8.3–10.6)
CHLORIDE SERPL-SCNC: 99 MMOL/L (ref 99–110)
CO2 SERPL-SCNC: 28 MMOL/L (ref 21–32)
CREAT SERPL-MCNC: 0.7 MG/DL (ref 0.6–1.2)
DEPRECATED RDW RBC AUTO: 17.5 % (ref 12.4–15.4)
ECHO BSA: 1.55 M2
ECHO TV REGURGITANT MAX VELOCITY: 2.42 M/S
ECHO TV REGURGITANT PEAK GRADIENT: 23 MMHG
EKG DIAGNOSIS: NORMAL
EKG Q-T INTERVAL: 266 MS
EKG QRS DURATION: 124 MS
EKG QTC CALCULATION (BAZETT): 389 MS
EKG R AXIS: -54 DEGREES
EKG T AXIS: 98 DEGREES
EKG VENTRICULAR RATE: 129 BPM
GFR SERPLBLD CREATININE-BSD FMLA CKD-EPI: >90 ML/MIN/{1.73_M2}
GLUCOSE SERPL-MCNC: 116 MG/DL (ref 70–99)
HCT VFR BLD AUTO: 28.5 % (ref 36–48)
HGB BLD-MCNC: 9.7 G/DL (ref 12–16)
INR PPP: 3.82 (ref 0.85–1.15)
MCH RBC QN AUTO: 34.5 PG (ref 26–34)
MCHC RBC AUTO-ENTMCNC: 34 G/DL (ref 31–36)
MCV RBC AUTO: 101.4 FL (ref 80–100)
PLATELET # BLD AUTO: 69 K/UL (ref 135–450)
PMV BLD AUTO: 7.8 FL (ref 5–10.5)
POTASSIUM SERPL-SCNC: 3.6 MMOL/L (ref 3.5–5.1)
PROTHROMBIN TIME: 37.3 SEC (ref 11.9–14.9)
RBC # BLD AUTO: 2.81 M/UL (ref 4–5.2)
SODIUM SERPL-SCNC: 138 MMOL/L (ref 136–145)
WBC # BLD AUTO: 11.7 K/UL (ref 4–11)

## 2024-06-17 PROCEDURE — 85610 PROTHROMBIN TIME: CPT

## 2024-06-17 PROCEDURE — 85027 COMPLETE CBC AUTOMATED: CPT

## 2024-06-17 PROCEDURE — 36415 COLL VENOUS BLD VENIPUNCTURE: CPT

## 2024-06-17 PROCEDURE — 6370000000 HC RX 637 (ALT 250 FOR IP): Performed by: NURSE PRACTITIONER

## 2024-06-17 PROCEDURE — 6370000000 HC RX 637 (ALT 250 FOR IP): Performed by: INTERNAL MEDICINE

## 2024-06-17 PROCEDURE — 1200000000 HC SEMI PRIVATE

## 2024-06-17 PROCEDURE — 80048 BASIC METABOLIC PNL TOTAL CA: CPT

## 2024-06-17 PROCEDURE — 2580000003 HC RX 258: Performed by: NURSE PRACTITIONER

## 2024-06-17 PROCEDURE — 99223 1ST HOSP IP/OBS HIGH 75: CPT | Performed by: INTERNAL MEDICINE

## 2024-06-17 PROCEDURE — 93306 TTE W/DOPPLER COMPLETE: CPT

## 2024-06-17 PROCEDURE — 2700000000 HC OXYGEN THERAPY PER DAY

## 2024-06-17 PROCEDURE — 94761 N-INVAS EAR/PLS OXIMETRY MLT: CPT

## 2024-06-17 RX ORDER — MORPHINE SULFATE 15 MG/1
15 TABLET ORAL 2 TIMES DAILY
Status: DISCONTINUED | OUTPATIENT
Start: 2024-06-17 | End: 2024-06-19 | Stop reason: HOSPADM

## 2024-06-17 RX ORDER — AMIODARONE HYDROCHLORIDE 200 MG/1
200 TABLET ORAL DAILY
Status: DISCONTINUED | OUTPATIENT
Start: 2024-06-17 | End: 2024-06-19 | Stop reason: HOSPADM

## 2024-06-17 RX ORDER — METOPROLOL SUCCINATE 25 MG/1
25 TABLET, EXTENDED RELEASE ORAL ONCE
Status: COMPLETED | OUTPATIENT
Start: 2024-06-17 | End: 2024-06-17

## 2024-06-17 RX ADMIN — MORPHINE SULFATE 30 MG: 15 TABLET ORAL at 08:17

## 2024-06-17 RX ADMIN — METOPROLOL SUCCINATE 25 MG: 25 TABLET, EXTENDED RELEASE ORAL at 00:15

## 2024-06-17 RX ADMIN — FUROSEMIDE 20 MG: 20 TABLET ORAL at 08:16

## 2024-06-17 RX ADMIN — FOLIC ACID 1000 MCG: 1 TABLET ORAL at 08:16

## 2024-06-17 RX ADMIN — SPIRONOLACTONE 25 MG: 25 TABLET ORAL at 08:17

## 2024-06-17 RX ADMIN — Medication 10 ML: at 21:11

## 2024-06-17 RX ADMIN — METOPROLOL SUCCINATE 25 MG: 25 TABLET, EXTENDED RELEASE ORAL at 08:17

## 2024-06-17 RX ADMIN — Medication 10 ML: at 08:18

## 2024-06-17 RX ADMIN — SACUBITRIL AND VALSARTAN 1 TABLET: 24; 26 TABLET, FILM COATED ORAL at 08:17

## 2024-06-17 RX ADMIN — LEVOTHYROXINE SODIUM 50 MCG: 0.05 TABLET ORAL at 08:17

## 2024-06-17 ASSESSMENT — PAIN DESCRIPTION - ORIENTATION
ORIENTATION: LOWER
ORIENTATION: LOWER

## 2024-06-17 ASSESSMENT — PAIN SCALES - GENERAL
PAINLEVEL_OUTOF10: 0
PAINLEVEL_OUTOF10: 3

## 2024-06-17 ASSESSMENT — PAIN DESCRIPTION - PAIN TYPE
TYPE: CHRONIC PAIN
TYPE: CHRONIC PAIN

## 2024-06-17 ASSESSMENT — PAIN DESCRIPTION - LOCATION
LOCATION: BACK
LOCATION: BACK

## 2024-06-17 ASSESSMENT — PAIN DESCRIPTION - DESCRIPTORS
DESCRIPTORS: ACHING
DESCRIPTORS: ACHING

## 2024-06-17 ASSESSMENT — PAIN SCALES - WONG BAKER: WONGBAKER_NUMERICALRESPONSE: NO HURT

## 2024-06-17 NOTE — PLAN OF CARE
Problem: Discharge Planning  Goal: Discharge to home or other facility with appropriate resources  6/17/2024 1154 by Leticia Lamb RN  Outcome: Progressing     Problem: Pain  Goal: Verbalizes/displays adequate comfort level or baseline comfort level  6/17/2024 1154 by Leticia Lamb RN  Outcome: Progressing     Problem: Safety - Adult  Goal: Free from fall injury  6/17/2024 1154 by Leticia Lamb RN  Outcome: Progressing     Problem: Skin/Tissue Integrity  Goal: Absence of new skin breakdown  Description: 1.  Monitor for areas of redness and/or skin breakdown  2.  Assess vascular access sites hourly  3.  Every 4-6 hours minimum:  Change oxygen saturation probe site  4.  Every 4-6 hours:  If on nasal continuous positive airway pressure, respiratory therapy assess nares and determine need for appliance change or resting period.  6/17/2024 1154 by Leticia Lamb RN  Outcome: Progressing     Problem: Chronic Conditions and Co-morbidities  Goal: Patient's chronic conditions and co-morbidity symptoms are monitored and maintained or improved  6/17/2024 1154 by Leticia Lamb, RN  Outcome: Progressing

## 2024-06-17 NOTE — FLOWSHEET NOTE
06/17/24 1156   Vital Signs   Temp 98.4 °F (36.9 °C)   Temp Source Oral   Pulse 68   Heart Rate Source Monitor   Respirations 16   BP (!) 85/40   MAP (Calculated) 55   Pain Assessment   Pain Assessment None - Denies Pain   Oxygen Therapy   SpO2 93 %   O2 Device Nasal cannula   O2 Flow Rate (L/min) 2 L/min     BP Low. Patient in bed resting with eyes closed. Notified Cardiology and Dr. Herrera via perfect serve. NNOs.

## 2024-06-17 NOTE — CONSULTS
Pharmacy Note  Warfarin Consult  Dx: Afib/Tx DVT/PE  Goal INR range 2.5-3.5  Home Warfarin dose: 1.5 mg (3 mg x 0.5) every Fri; 3 mg (3 mg x 1) all other days   New start Warfarin with Heparin/Enoxaparin bridge.  Would recommend continue bridge until INR therapeutic.     Date  INR  Warfarin    Recommend Warfarin mg tonight x1.  Daily INR ordered.  Rx will continue to manage therapy per consult order.  Keyshawn Zaragoza PharmD 6/16/2024  3:13 AM      Pharmacy Note  Warfarin Consult    Dx: Afib/Tx DVT/PE  Goal INR range 2.5-3.5 (Patient's goal range outpatient is 2-3, will need to clarify goal range).   Home Warfarin dose: 1.5 mg (3 mg x 0.5) every Fri; 3 mg (3 mg x 1) all other days     Date INR Warfarin Dose Notes   6/16 3.77 1 mg (ordered)            Plan:  INR today is supratherapeutic at 3.77.  Drug interactions: digoxin given x2 on 6/15 and 6/16  Will give warfarin 1 mg tonight.  Daily INR ordered.  Rx will continue to manage therapy per consult order.    Thank you for the consult!  Nany Ochoa, PharmD, [facility]       Pharmacy Note  Warfarin Consult  Dx: Afib/Tx DVT/PE  Goal INR range 2.5-3.5  Home Warfarin dose: 1.5 mg (3 mg x 0.5) every Fri; 3 mg (3 mg x 1) all other days   New start Warfarin with Heparin/Enoxaparin bridge.  Would recommend continue bridge until INR therapeutic.      Date                 INR                  Warfarin   6/17                3.82                        -  6/18                 2.80                      2 mg  6/19                 2.17                      3 mg     Recommend  Warfarin 3 mg tonight x1.  Daily INR ordered.  Rx will continue to manage therapy per consult order.  Keyshawn Zaragoza PharmD 6/19/2024  6:03 AM  
Called cardiology @ [phone]  PER: Felipe Rust PA-C  RE:  A fib with hx of chf  Nikhil Leung MD responded @ [phone]  
Consult dictated # [id number]  
[facility]  Cardiology Note  [phone]      Chief Complaint   Patient presents with    Atrial Fibrillation     PT ARRIVES FROM HOME IN a FIB RVR.  Pt refused squad PIV. Pt also refusing a time of arrival.  Pt educated that PIV and labs are essential for evaluation and treatment.  Pt states she has not eaten in 3 day.          History of Present Illness:  Lyndsay Roger is a 69 y.o. patient PMHx AF s/p ablation, DVT, CMP c/p BiV ICD with extraction in 2019 due to infection and failure to respond. Lung Ca with possible recent metastasis who presented to the hospital with complaints of palpitations in the setting of poor PO intake.    Patient was found to be in AF with RVR and received a partial dig load. Trop elevated to 50'sx2 for which cardiology is consulted    Patient reports feeling hungry this morning. Otherwise she has no complaints.     Past Medical History:   has a past medical history of Acute DVT (deep venous thrombosis) ([facility]), Allergic rhinitis, Anesthesia complication, Anesthesia complication, Anxiety, Atrial fibrillation (HCC), B12 deficiency, Cancer (HCC), CARDIAC TAMPONADE, Cardiomyopathy, Cataracts, CHF (congestive heart failure) (HCC), Chronic back pain, DDD (degenerative disc disease), lumbosacral, Depression, DJD (degenerative joint disease), cervical, GERD (gastroesophageal reflux disease), H/O cold sores, HX: breast cancer, Hypothyroid, ICD (implantable cardiac defibrillator) in place, Lung cancer (HCC), Neuropathy, Osteopenia, Pneumonia, Prolonged emergence from general anesthesia, and S/P cardiac cath.    Surgical History:   has a past surgical history that includes Cataract removal (01/01/1996); Cholecystectomy (01/01/2008); Mastectomy, modified radical (01/01/1993); back surgery (06/19/2007); Carpal tunnel release (01/01/2000); Cervical spine surgery (06/01/2009); Colonoscopy (12/29/2010); Upper gastrointestinal endoscopy (12/29/2010); Breast reconstruction (01/01/1998); 
minute.  There is low QRS voltage in the limb leads.  There is nonspecific intraventricular conduction delay.    IMPRESSION:    1. Paroxysmal atrial fibrillation.  2. Large right pleural effusion.  3. Metastatic lung cancer.  4. Nonischemic cardiomyopathy.  5. Superior vena cava occlusion.    The patient is admitted for shortness of breath.  She was found to have atrial fibrillation at the time of admission.  She has had several terminations and recurrences of AF on ECG telemetry monitoring.  She did undergo cryoablation for atrial fibrillation in 2020.  I do not see any recent ambulatory event monitor, so it is not clear whether the paroxysmal atrial fibrillation which is now evident is longstanding.  Her rate control with atrial fibrillation has been somewhat difficult.  Her INRs have been therapeutic.  I think a low dose of amiodarone might be useful in this circumstance to try and suppress the paroxysms of atrial fibrillation. Patient and family concerns about amiodarone were reviewed. Overall, few attractive options remain.  In view of the large right pleural effusion and parenchymal lung disease, it is not clear that the atrial fibrillation is the sole etiology for shortness of breath with which she was admitted.    RECOMMENDATIONS:    1. Continuous ECG telemetry monitoring.  2. Continue warfarin with target INR 2.0 to 3.0.  3. Amiodarone 200 mg p.o. daily.  4. Monitor thyroid and hepatic function on amiodarone.    Thank you for the opportunity to assist in the care of this patient.  Please contact me if you have any questions regarding her evaluations.          SEVERIANO CANAS MD      D:  06/17/2024 12:04:55     T:  06/17/2024 15:29:02     MARGE/ANA  Job #:  [id number]     Doc#:  [id number]

## 2024-06-17 NOTE — FLOWSHEET NOTE
06/17/24 0808   Vital Signs   Temp 98.8 °F (37.1 °C)   Temp Source Oral   Pulse 70   Heart Rate Source Monitor   Respirations 18   BP 96/71   MAP (Calculated) 79   Pain Assessment   Pain Assessment None - Denies Pain   Pain Level 0   Oxygen Therapy   SpO2 99 %   O2 Device Nasal cannula   O2 Flow Rate (L/min) 2 L/min   Height and Weight   Height 1.6 m (5' 3\")   Weight - Scale 54 kg (119 lb)   BSA (Calculated - sq m) 1.55 sq meters   BMI (Calculated) 21.1       Lab Results   Component Value Date/Time    WBC 11.7 (H) 06/17/2024 05:19 AM    HGB 9.7 (L) 06/17/2024 05:19 AM    HCT 28.5 (L) 06/17/2024 05:19 AM    PLT 69 (L) 06/17/2024 05:19 AM     06/17/2024 05:19 AM    K 3.6 06/17/2024 05:19 AM    K 3.6 06/15/2024 08:26 PM    BUN 28 (H) 06/17/2024 05:19 AM    CREATININE 0.7 06/17/2024 05:19 AM    MG 1.70 (L) 09/20/2015 05:00 AM        AM Assessment completed.  Patient in bed.  Awake, Alert and oriented. Respirations easy unlabored.    Plan of care, education and safety measures reviewed and mutually agreed upon with the patient.   Calls appropriately. Needed items including call light in reach and exit alarm in place.   Echo completed at bedside this morning.

## 2024-06-18 LAB
ALBUMIN SERPL-MCNC: 2.8 G/DL (ref 3.4–5)
ALP SERPL-CCNC: 136 U/L (ref 40–129)
ALT SERPL-CCNC: 10 U/L (ref 10–40)
ANION GAP SERPL CALCULATED.3IONS-SCNC: 12 MMOL/L (ref 3–16)
AST SERPL-CCNC: 31 U/L (ref 15–37)
BILIRUB DIRECT SERPL-MCNC: 0.5 MG/DL (ref 0–0.3)
BILIRUB INDIRECT SERPL-MCNC: 1.1 MG/DL (ref 0–1)
BILIRUB SERPL-MCNC: 1.6 MG/DL (ref 0–1)
BUN SERPL-MCNC: 35 MG/DL (ref 7–20)
CALCIUM SERPL-MCNC: 8.7 MG/DL (ref 8.3–10.6)
CHLORIDE SERPL-SCNC: 97 MMOL/L (ref 99–110)
CO2 SERPL-SCNC: 25 MMOL/L (ref 21–32)
CREAT SERPL-MCNC: 0.8 MG/DL (ref 0.6–1.2)
DEPRECATED RDW RBC AUTO: 17.6 % (ref 12.4–15.4)
GFR SERPLBLD CREATININE-BSD FMLA CKD-EPI: 79 ML/MIN/{1.73_M2}
GLUCOSE SERPL-MCNC: 111 MG/DL (ref 70–99)
HCT VFR BLD AUTO: 26.2 % (ref 36–48)
HGB BLD-MCNC: 9 G/DL (ref 12–16)
INR PPP: 2.8 (ref 0.85–1.15)
MCH RBC QN AUTO: 34.4 PG (ref 26–34)
MCHC RBC AUTO-ENTMCNC: 34.2 G/DL (ref 31–36)
MCV RBC AUTO: 100.8 FL (ref 80–100)
PLATELET # BLD AUTO: 67 K/UL (ref 135–450)
PLATELET BLD QL SMEAR: ABNORMAL
PMV BLD AUTO: 8.5 FL (ref 5–10.5)
POTASSIUM SERPL-SCNC: 4.1 MMOL/L (ref 3.5–5.1)
PROT SERPL-MCNC: 5.3 G/DL (ref 6.4–8.2)
PROTHROMBIN TIME: 29.4 SEC (ref 11.9–14.9)
RBC # BLD AUTO: 2.6 M/UL (ref 4–5.2)
SLIDE REVIEW: ABNORMAL
SODIUM SERPL-SCNC: 134 MMOL/L (ref 136–145)
WBC # BLD AUTO: 11.6 K/UL (ref 4–11)

## 2024-06-18 PROCEDURE — 1200000000 HC SEMI PRIVATE

## 2024-06-18 PROCEDURE — 97530 THERAPEUTIC ACTIVITIES: CPT

## 2024-06-18 PROCEDURE — 6370000000 HC RX 637 (ALT 250 FOR IP): Performed by: NURSE PRACTITIONER

## 2024-06-18 PROCEDURE — P9047 ALBUMIN (HUMAN), 25%, 50ML: HCPCS | Performed by: NURSE PRACTITIONER

## 2024-06-18 PROCEDURE — 80048 BASIC METABOLIC PNL TOTAL CA: CPT

## 2024-06-18 PROCEDURE — 97162 PT EVAL MOD COMPLEX 30 MIN: CPT

## 2024-06-18 PROCEDURE — 6360000002 HC RX W HCPCS: Performed by: NURSE PRACTITIONER

## 2024-06-18 PROCEDURE — 94761 N-INVAS EAR/PLS OXIMETRY MLT: CPT

## 2024-06-18 PROCEDURE — 99232 SBSQ HOSP IP/OBS MODERATE 35: CPT | Performed by: NURSE PRACTITIONER

## 2024-06-18 PROCEDURE — 6370000000 HC RX 637 (ALT 250 FOR IP): Performed by: INTERNAL MEDICINE

## 2024-06-18 PROCEDURE — 97166 OT EVAL MOD COMPLEX 45 MIN: CPT

## 2024-06-18 PROCEDURE — 85027 COMPLETE CBC AUTOMATED: CPT

## 2024-06-18 PROCEDURE — 97535 SELF CARE MNGMENT TRAINING: CPT

## 2024-06-18 PROCEDURE — 80076 HEPATIC FUNCTION PANEL: CPT

## 2024-06-18 PROCEDURE — 36415 COLL VENOUS BLD VENIPUNCTURE: CPT

## 2024-06-18 PROCEDURE — 2700000000 HC OXYGEN THERAPY PER DAY

## 2024-06-18 PROCEDURE — 93005 ELECTROCARDIOGRAM TRACING: CPT

## 2024-06-18 PROCEDURE — 85610 PROTHROMBIN TIME: CPT

## 2024-06-18 PROCEDURE — 2580000003 HC RX 258: Performed by: NURSE PRACTITIONER

## 2024-06-18 RX ORDER — WARFARIN SODIUM 2 MG/1
2 TABLET ORAL
Status: COMPLETED | OUTPATIENT
Start: 2024-06-18 | End: 2024-06-18

## 2024-06-18 RX ORDER — MIDODRINE HYDROCHLORIDE 5 MG/1
5 TABLET ORAL ONCE
Status: COMPLETED | OUTPATIENT
Start: 2024-06-18 | End: 2024-06-18

## 2024-06-18 RX ORDER — ALBUMIN (HUMAN) 12.5 G/50ML
12.5 SOLUTION INTRAVENOUS ONCE
Status: COMPLETED | OUTPATIENT
Start: 2024-06-18 | End: 2024-06-18

## 2024-06-18 RX ORDER — DIGOXIN 0.25 MG/ML
250 INJECTION INTRAMUSCULAR; INTRAVENOUS ONCE
Status: COMPLETED | OUTPATIENT
Start: 2024-06-18 | End: 2024-06-18

## 2024-06-18 RX ADMIN — WARFARIN SODIUM 2 MG: 2 TABLET ORAL at 18:22

## 2024-06-18 RX ADMIN — FOLIC ACID 1000 MCG: 1 TABLET ORAL at 09:26

## 2024-06-18 RX ADMIN — ALBUMIN (HUMAN) 12.5 G: 0.25 INJECTION, SOLUTION INTRAVENOUS at 01:56

## 2024-06-18 RX ADMIN — Medication 10 ML: at 09:27

## 2024-06-18 RX ADMIN — DIGOXIN 250 MCG: 0.25 INJECTION INTRAMUSCULAR; INTRAVENOUS at 21:44

## 2024-06-18 RX ADMIN — Medication 10 ML: at 21:46

## 2024-06-18 RX ADMIN — FUROSEMIDE 20 MG: 20 TABLET ORAL at 09:26

## 2024-06-18 RX ADMIN — SACUBITRIL AND VALSARTAN 1 TABLET: 24; 26 TABLET, FILM COATED ORAL at 09:26

## 2024-06-18 RX ADMIN — LEVOTHYROXINE SODIUM 50 MCG: 0.05 TABLET ORAL at 09:26

## 2024-06-18 RX ADMIN — MIDODRINE HYDROCHLORIDE 5 MG: 5 TABLET ORAL at 01:52

## 2024-06-18 ASSESSMENT — PAIN SCALES - WONG BAKER
WONGBAKER_NUMERICALRESPONSE: NO HURT
WONGBAKER_NUMERICALRESPONSE: NO HURT

## 2024-06-18 ASSESSMENT — PAIN SCALES - GENERAL: PAINLEVEL_OUTOF10: 0

## 2024-06-18 NOTE — PLAN OF CARE
Problem: Pain  Goal: Verbalizes/displays adequate comfort level or baseline comfort level  Outcome: Progressing     Problem: Safety - Adult  Goal: Free from fall injury  Outcome: Progressing     Problem: Skin/Tissue Integrity  Goal: Absence of new skin breakdown  Description: 1.  Monitor for areas of redness and/or skin breakdown  2.  Assess vascular access sites hourly  3.  Every 4-6 hours minimum:  Change oxygen saturation probe site  4.  Every 4-6 hours:  If on nasal continuous positive airway pressure, respiratory therapy assess nares and determine need for appliance change or resting period.  Outcome: Progressing     Problem: Chronic Conditions and Co-morbidities  Goal: Patient's chronic conditions and co-morbidity symptoms are monitored and maintained or improved  Outcome: Progressing     Problem: Nutrition Deficit:  Goal: Optimize nutritional status  Outcome: Progressing

## 2024-06-18 NOTE — PLAN OF CARE
Problem: Discharge Planning  Goal: Discharge to home or other facility with appropriate resources  Outcome: Progressing     Problem: Pain  Goal: Verbalizes/displays adequate comfort level or baseline comfort level  6/18/2024 1019 by Samantha Mcqueen RN  Outcome: Progressing     Problem: Safety - Adult  Goal: Free from fall injury  6/18/2024 1019 by Samantha Mcqueen RN  Outcome: Progressing     Problem: Skin/Tissue Integrity  Goal: Absence of new skin breakdown  Description: 1.  Monitor for areas of redness and/or skin breakdown  2.  Assess vascular access sites hourly  3.  Every 4-6 hours minimum:  Change oxygen saturation probe site  4.  Every 4-6 hours:  If on nasal continuous positive airway pressure, respiratory therapy assess nares and determine need for appliance change or resting period.  6/18/2024 1019 by Samantha Mcqueen RN  Outcome: Progressing     Problem: Chronic Conditions and Co-morbidities  Goal: Patient's chronic conditions and co-morbidity symptoms are monitored and maintained or improved  6/18/2024 1019 by Samantha Mcqueen RN  Outcome: Progressing     Problem: Nutrition Deficit:  Goal: Optimize nutritional status  6/18/2024 1019 by Samantha Mcqueen RN  Outcome: Progressing

## 2024-06-18 NOTE — CARE COORDINATION
Case Management Assessment  Initial Evaluation    Date/Time of Evaluation: 6/18/2024 9:34 AM  Assessment Completed by: Brittny Hill RN    If patient is discharged prior to next notation, then this note serves as note for discharge by case management.    Patient Name: Lyndsay Roger                   YOB: 1955  Diagnosis: Pleural effusion [J90]  Permanent atrial fibrillation (HCC) [I48.21]  Atrial fibrillation with RVR (HCC) [I48.91]  Poor fluid intake [R63.8]  Hx of heart failure [Z86.79]  Acute on chronic congestive heart failure, unspecified heart failure type (HCC) [I50.9]                   Date / Time: 6/15/2024  7:58 PM    Patient Admission Status: Inpatient   Readmission Risk (Low < 19, Mod (19-27), High > 27): Readmission Risk Score: 19.4    Current PCP: Ashley Colin MD  PCP verified by CM? Yes    Chart Reviewed: Yes      History Provided by: Patient  Patient Orientation: Alert and Oriented    Patient Cognition: Alert    Hospitalization in the last 30 days (Readmission):  No    If yes, Readmission Assessment in CM Navigator will be completed.    Advance Directives:      Code Status: Full Code   Patient's Primary Decision Maker is: Legal Next of Kin      Discharge Planning:    Patient lives with: Spouse/Significant Other Type of Home: House  Primary Care Giver: Self  Patient Support Systems include: Spouse/Significant Other, Children   Current Financial resources: Medicare  Current community resources: None  Current services prior to admission: Durable Medical Equipment            Current DME: Cane, Walker, Other (Comment) (grab bars)            Type of Home Care services:  None    ADLS  Prior functional level: Independent in ADLs/IADLs  Current functional level: Independent in ADLs/IADLs    PT AM-PAC:   /24  OT AM-PAC:   /24    Family can provide assistance at DC: Yes  Would you like Case Management to discuss the discharge plan with any other family members/significant others, and

## 2024-06-18 NOTE — CARE COORDINATION
Therapy rec's of SNF noted.  Discussed rec's with patient and .  They are in agreement with this plan and would like a referral to be made to [facility] as patient has been there before.  Placed call to Brianda with [facility]. She states she will review and call back with whether or not they are able to accept.  Of note, she will not have a bed available until Saturday so if patient is discharged earlier, will need to get a second SNF choice.   No precert needed as patient is primary Medicare (listed wrong in epic).

## 2024-06-18 NOTE — ED PROVIDER NOTES
[facility] Emergency Department    CHIEF COMPLAINT  Atrial Fibrillation (PT ARRIVES FROM HOME IN a FIB RVR.  Pt refused squad PIV. Pt also refusing a time of arrival.  Pt educated that PIV and labs are essential for evaluation and treatment.  Pt states she has not eaten in 3 day.  )      SHARED SERVICE VISIT  I have seen and evaluated this patient with my supervising physician, Dr. Rachel Agustin.    HISTORY OF PRESENT ILLNESS  Lyndsay Roger is a 69 y.o. female who presents to the ED complaining of general weakness and lack of appetite.  Patient is experiencing some shortness of breath.  She says she has not eaten any food in the past 3 days.  Does have a history of A-fib and feels like she might be in a-fib at this time.  Had EMS transported to emergency department.  EMS found patient was in A-fib with RVR.  Currently on anticoagulation with Coumadin and she says that she has been compliant with medications. She states that even though she has not had much to eat or drink over the past 3 days, she has still been compliant with her medications. Denies any headache, body ache, fevers or chills.  Denies any coughing or sneezing.  Denies any sore throat or congestion.  Denies any vision changes or dizziness.  Denies any nausea, vomiting, or abdominal pain.  Denies any urinary symptoms.  Denies any diarrhea or bloody stools.  Denies any new onset back pain.  Denies any recent travel or sick contacts.    No other complaints, modifying factors or associated symptoms.     Nursing notes reviewed.   Past Medical History:   Diagnosis Date    Acute DVT (deep venous thrombosis) ([facility]) 07/2014    right arm     Allergic rhinitis     Anesthesia complication     PATIENT STATES IS VERY HARD TO PUT UNDER-PATIENT STATES SHE NEVER WENT TO SLEEP FOR  BONE MARROW BIOPSY    Anesthesia complication     PATIENT STATES MOTHER IS HARD TO PUT UNDER ANESTHESIA-    Anxiety     Atrial fibrillation (HCC)     B12 
correctly.    MD Vamsi Barnes Erica J, MD  06/15/24 4120

## 2024-06-19 VITALS
DIASTOLIC BLOOD PRESSURE: 58 MMHG | BODY MASS INDEX: 21.09 KG/M2 | WEIGHT: 119 LBS | RESPIRATION RATE: 18 BRPM | SYSTOLIC BLOOD PRESSURE: 94 MMHG | HEIGHT: 63 IN | TEMPERATURE: 97.9 F | HEART RATE: 72 BPM | OXYGEN SATURATION: 100 %

## 2024-06-19 LAB
EKG DIAGNOSIS: NORMAL
EKG Q-T INTERVAL: 310 MS
EKG QRS DURATION: 126 MS
EKG QTC CALCULATION (BAZETT): 466 MS
EKG R AXIS: -53 DEGREES
EKG T AXIS: 136 DEGREES
EKG VENTRICULAR RATE: 136 BPM
INR PPP: 2.17 (ref 0.85–1.15)
PROTHROMBIN TIME: 24.2 SEC (ref 11.9–14.9)

## 2024-06-19 PROCEDURE — 99232 SBSQ HOSP IP/OBS MODERATE 35: CPT | Performed by: NURSE PRACTITIONER

## 2024-06-19 PROCEDURE — 2580000003 HC RX 258: Performed by: NURSE PRACTITIONER

## 2024-06-19 PROCEDURE — 36415 COLL VENOUS BLD VENIPUNCTURE: CPT

## 2024-06-19 PROCEDURE — 6370000000 HC RX 637 (ALT 250 FOR IP): Performed by: NURSE PRACTITIONER

## 2024-06-19 PROCEDURE — 85610 PROTHROMBIN TIME: CPT

## 2024-06-19 PROCEDURE — 6370000000 HC RX 637 (ALT 250 FOR IP): Performed by: INTERNAL MEDICINE

## 2024-06-19 RX ORDER — OXYCODONE HYDROCHLORIDE 5 MG/1
5 CAPSULE ORAL EVERY 4 HOURS PRN
Qty: 18 CAPSULE | Refills: 0 | Status: SHIPPED | OUTPATIENT
Start: 2024-06-19 | End: 2024-06-22

## 2024-06-19 RX ORDER — METOPROLOL SUCCINATE 25 MG/1
25 TABLET, EXTENDED RELEASE ORAL DAILY
Qty: 30 TABLET | Refills: 0
Start: 2024-06-20

## 2024-06-19 RX ORDER — MORPHINE SULFATE 30 MG/1
30 TABLET ORAL 2 TIMES DAILY
Qty: 6 TABLET | Refills: 0 | Status: SHIPPED | OUTPATIENT
Start: 2024-06-19 | End: 2024-06-22

## 2024-06-19 RX ORDER — CLONAZEPAM 1 MG/1
2 TABLET ORAL NIGHTLY
Qty: 6 TABLET | Refills: 0 | Status: SHIPPED | OUTPATIENT
Start: 2024-06-19 | End: 2024-06-22

## 2024-06-19 RX ORDER — AMIODARONE HYDROCHLORIDE 200 MG/1
200 TABLET ORAL DAILY
Qty: 30 TABLET | Refills: 0
Start: 2024-06-20

## 2024-06-19 RX ADMIN — LEVOTHYROXINE SODIUM 50 MCG: 0.05 TABLET ORAL at 08:45

## 2024-06-19 RX ADMIN — AMIODARONE HYDROCHLORIDE 200 MG: 200 TABLET ORAL at 08:45

## 2024-06-19 RX ADMIN — METOPROLOL SUCCINATE 25 MG: 25 TABLET, EXTENDED RELEASE ORAL at 08:45

## 2024-06-19 RX ADMIN — POLYETHYLENE GLYCOL 3350 17 G: 17 POWDER, FOR SOLUTION ORAL at 08:45

## 2024-06-19 RX ADMIN — SACUBITRIL AND VALSARTAN 1 TABLET: 24; 26 TABLET, FILM COATED ORAL at 08:45

## 2024-06-19 RX ADMIN — FOLIC ACID 1000 MCG: 1 TABLET ORAL at 08:45

## 2024-06-19 RX ADMIN — Medication 10 ML: at 08:46

## 2024-06-19 NOTE — PROGRESS NOTES
[facility] Medicine Progress Note      Date of Admission: 6/15/2024  Hospital Day: 4    Chief Admission Complaint:  SOB     Subjective:  no new complaints today    Presenting Admission History:       69 y.o. female, with PMH of atrial fibrillation, breast cancer, hypothyroidism, who presented to [facility] with shortness of breath. History obtained from the patient and review of EMR.  The patient stated this afternoon she had an onset of shortness of breath.  She stated that her shortness of breath is worse on exertion.  The patient stated she actually \"felt like she was in atrial fibrillation\".  However, the patient currently stated that it was this afternoon, but not given time.  Patient stated she has also been experiencing a poor appetite with decreased fluids and food.  She does state that she is compliant with taking all of her medications. In the emergency department the patient was found to have an elevated D-dimer, therefore, a CT chest pulmonary embolism was obtained that revealed no evidence of pulmonary arterial embolism.Moderate to large right pleural effusion has increased in size, with small left pleural effusion.  Opacified portion of the right lower lobe and along the left base may represent atelectatic changes but pneumonia is not excluded.  Nodular opacity along the medial right upper lobe and posterior edge of the right upper lobe are not changed from recent exam.  A CT abdomen and pelvis was also obtained that [id number] mm hyperenhancing subcapsular lesion along the right lobe of the liver which is more apparent and could represent hemangioma or enhancing mass by radiology.  Upon further workup, the patient was found to be in atrial fibrillation with RVR.  She was given 1 L of normal saline and digoxin x 2.  The patient is currently anticoagulated on Coumadin.  She was also found to have an elevated troponin of 52 with repeat troponin of 51.  The patient denies any chest pain.  This is 
  Pharmacy Note  Warfarin Consult  Dx: Afib/Tx DVT/PE  Goal INR range 2.5-3.5  Home Warfarin dose: 1.5 mg (3 mg x 0.5) every Fri; 3 mg (3 mg x 1) all other days   New start Warfarin with Heparin/Enoxaparin bridge.  Would recommend continue bridge until INR therapeutic.      Date                 INR                  Warfarin   6/17                3.82                        -  6/18                 2.80                      2 mg    Recommend  Warfarin 2mg tonight x1.  Daily INR ordered.  Rx will continue to manage therapy per consult order.  Keyshawn Zaragoza PharmD 6/18/2024  6:08 AM  
  Pharmacy Note  Warfarin Consult  Dx: Afib/Tx DVT/PE  Goal INR range 2.5-3.5  Home Warfarin dose: 1.5 mg (3 mg x 0.5) every Fri; 3 mg (3 mg x 1) all other days   New start Warfarin with Heparin/Enoxaparin bridge.  Would recommend continue bridge until INR therapeutic.      Date                 INR                  Warfarin   6/17                3.82                        -  Recommend  hold Warfarin mg tonight x1.  Daily INR ordered.  Rx will continue to manage therapy per consult order.  Keyshawn Zaragoza PharmD 6/17/2024  5:47 AM  
  [facility] Medicine Progress Note      Date of Admission: 6/15/2024  Hospital Day: 3    Chief Admission Complaint:  SOB     Subjective:  no new complaints today    Presenting Admission History:       69 y.o. female, with PMH of atrial fibrillation, breast cancer, hypothyroidism, who presented to [facility] with shortness of breath. History obtained from the patient and review of EMR.  The patient stated this afternoon she had an onset of shortness of breath.  She stated that her shortness of breath is worse on exertion.  The patient stated she actually \"felt like she was in atrial fibrillation\".  However, the patient currently stated that it was this afternoon, but not given time.  Patient stated she has also been experiencing a poor appetite with decreased fluids and food.  She does state that she is compliant with taking all of her medications. In the emergency department the patient was found to have an elevated D-dimer, therefore, a CT chest pulmonary embolism was obtained that revealed no evidence of pulmonary arterial embolism.Moderate to large right pleural effusion has increased in size, with small left pleural effusion.  Opacified portion of the right lower lobe and along the left base may represent atelectatic changes but pneumonia is not excluded.  Nodular opacity along the medial right upper lobe and posterior edge of the right upper lobe are not changed from recent exam.  A CT abdomen and pelvis was also obtained that [id number] mm hyperenhancing subcapsular lesion along the right lobe of the liver which is more apparent and could represent hemangioma or enhancing mass by radiology.  Upon further workup, the patient was found to be in atrial fibrillation with RVR.  She was given 1 L of normal saline and digoxin x 2.  The patient is currently anticoagulated on Coumadin.  She was also found to have an elevated troponin of 52 with repeat troponin of 51.  The patient denies any chest pain.  This is 
4 Eyes Skin Assessment     The patient is being assess for  Admission    I agree that 2 RN's have performed a thorough Head to Toe Skin Assessment on the patient. ALL assessment sites listed below have been assessed.       Areas assessed by both nurses: Kasey RN and Edna RN  [x]   Head, Face, and Ears   [x]   Shoulders, Back, and Chest  [x]   Arms, Elbows, and Hands   [x]   Coccyx, Sacrum, and Ischum  [x]   Legs, Feet, and Heels        Does the Patient have Skin Breakdown?  No         Shawn Prevention initiated:  Yes   Wound Care Orders initiated:  No      [facility] nurse consulted for Pressure Injury (Stage 3,4, Unstageable, DTI, NWPT, and Complex wounds):  No      Nurse 1 eSignature: Electronically signed by Kasey Gaston RN on 6/16/24 at 5:04 AM EDT    **SHARE this note so that the co-signing nurse is able to place an eSignature**    Nurse 2 eSignature: Electronically signed by Edna Mckinney RN on 6/16/24 at 5:05 AM EDT             
Comprehensive Nutrition Assessment    Type and Reason for Visit:  Initial, Consult    Nutrition Recommendations/Plan:   Recommend no added salt diet  Encourage po intakes  Monitor po intakes, nutrition adequacy, weights, pertinent labs, BMs     Malnutrition Assessment:  Malnutrition Status:  At risk for malnutrition (Comment) (Pt drowsy and falling asleep during interview) (06/17/24 1315)      Nutrition Assessment:    Consult for oral nutrition supplements. Pt with PMHx of afib, CHF, admitted with SOB. Currently on a low sodium diet with po intakes 1-100% per EMR. Pt drowsy at time of visit and unable to provide much feedback. Pt endorses having a poor appetite PTA. Pt reports that she likes Ensure and has been drinking some of her ONS. Will continue ONS order. PATRICIA weight changes d/t lack of actual weight Hx in EMR. Encouraged po intakes. Will liberalize diet to RAÚL. Will monitor.    Nutrition Related Findings:    Labs reviewed. Last BM on 6/14. Wound Type: None       Current Nutrition Intake & Therapies:    Average Meal Intake: 1-25%, 26-50%, %  Average Supplements Intake: Unable to assess  ADULT DIET; Regular; Low Sodium (2 gm)  ADULT ORAL NUTRITION SUPPLEMENT; Breakfast, Lunch, Dinner; Standard High Calorie/High Protein Oral Supplement    Anthropometric Measures:  Height: 160 cm (5' 3\")  Ideal Body Weight (IBW): 115 lbs (52 kg)       Current Body Weight: 54 kg (119 lb),   IBW. Weight Source: Standing Scale  Current BMI (kg/m2): 21.1                          BMI Categories: Underweight (BMI less than 22) age over 65    Estimated Daily Nutrient Needs:  Energy Requirements Based On: Kcal/kg  Weight Used for Energy Requirements: Current  Energy (kcal/day): 1034-8726  Weight Used for Protein Requirements: Current  Protein (g/day): 54-65  Method Used for Fluid Requirements: 1 ml/kcal  Fluid (ml/day): 0454-8147    Nutrition Diagnosis:   Inadequate oral intake related to inadequate protein-energy intake as 
HEART FAILURE CARE PLAN:    Comorbidities Reviewed: Yes   Patient has a past medical history of Acute DVT (deep venous thrombosis) ([facility]), Allergic rhinitis, Anesthesia complication, Anesthesia complication, Anxiety, Atrial fibrillation (HCC), B12 deficiency, Cancer (HCC), CARDIAC TAMPONADE, Cardiomyopathy, Cataracts, CHF (congestive heart failure) (HCC), Chronic back pain, DDD (degenerative disc disease), lumbosacral, Depression, DJD (degenerative joint disease), cervical, GERD (gastroesophageal reflux disease), H/O cold sores, HX: breast cancer, Hypothyroid, ICD (implantable cardiac defibrillator) in place, Lung cancer (HCC), Neuropathy, Osteopenia, Pneumonia, Prolonged emergence from general anesthesia, and S/P cardiac cath.     Weights Reviewed: Yes   Admission weight: 59 kg (130 lb 1.1 oz)   Wt Readings from Last 3 Encounters:   06/17/24 54 kg (119 lb)   12/18/23 59 kg (130 lb)   12/14/23 59.4 kg (131 lb)     Intake & Output Reviewed: Yes     Intake/Output Summary (Last 24 hours) at 6/17/2024 1151  Last data filed at 6/17/2024 1019  Gross per 24 hour   Intake 960 ml   Output 300 ml   Net 660 ml       ECHOCARDIOGRAM Reviewed: Yes   Patient's Ejection Fraction (EF) is less than or equal to 40%. Discuss HFrEF Guideline Directed Medical Therapy (GDMT) with Cardiologist or Hospitalist:          Medications Reviewed: Yes   SCHEDULED HOSPITAL MEDICATIONS:   clonazePAM  2 mg Oral Nightly    folic acid  1,000 mcg Oral Daily    furosemide  20 mg Oral Daily    levothyroxine  50 mcg Oral Daily    morphine  30 mg Oral BID    sacubitril-valsartan  1 tablet Oral BID    spironolactone  25 mg Oral Daily    sodium chloride flush  5-40 mL IntraVENous 2 times per day    warfarin placeholder: dosing by pharmacy   Other RX Placeholder    metoprolol succinate  25 mg Oral Daily     HOME MEDICATIONS:  Prior to Admission medications    Medication Sig Start Date End Date Taking? Authorizing Provider   levothyroxine (SYNTHROID) 50 MCG 
Occupational Therapy  Facility/Department: [facility] A2 CARD TELEMETRY  Occupational Therapy Initial Assessment    Name: Lyndsay Roger  : 1955  MRN: [id number]  Date of Service: 2024    Discharge Recommendations:  Subacute/Skilled Nursing Facility  OT Equipment Recommendations  Equipment Needed: No  Other: Defer to next level of care     Therapy discharge recommendations are subject to collaboration from the patient’s interdisciplinary healthcare team, including MD and case management recommendations.    Barriers to Home Discharge:   [] Steps to access home entry or bed/bath:   [x] Unable to transfer, ambulate, or propel wheelchair household distances without assist   [x] Limited available assist at home upon discharge    [] Patient or family requests d/c to post-acute facility    [x] Poor cognition/safety awareness for d/c to home alone    [] Unable to maintain ordered weight bearing status    [] Patient with salient signs of long-standing immobility   [x] Decreased independence with ADLs   [x] Increased risk for falls   [] Other:    If pt is unable to be seen after this session, please let this note serve as discharge summary.  Please see case management note for discharge disposition.  Thank you.    Patient Diagnosis(es): The primary encounter diagnosis was Atrial fibrillation with RVR ([facility]). Diagnoses of Poor fluid intake, Hx of heart failure, Pleural effusion, Acute on chronic congestive heart failure, unspecified heart failure type ([facility]), and Permanent atrial fibrillation ([facility]) were also pertinent to this visit.  Past Medical History:  has a past medical history of Acute DVT (deep venous thrombosis) ([facility]), Allergic rhinitis, Anesthesia complication, Anesthesia complication, Anxiety, Atrial fibrillation ([facility]), B12 deficiency, Cancer (HCC), CARDIAC TAMPONADE, Cardiomyopathy, Cataracts, CHF (congestive heart failure) ([facility]), Chronic back pain, DDD (degenerative disc disease), lumbosacral, Depression, 
Patient admitted earlier this morning by my colleague agree with assessment and plan as documented.  At the time my evaluation patient's heart rate was better under control as she was cleared for diet by cardiology.  Patient's other chief complaint is early satiety and decreased appetite.  Will asked the dietitian to see patient.    Tin Delatorre MD  Hospitalist    
Patient admitted to room 202 bed 2 from ED.  Patient oriented to room, bed rails, phone, lights and bathroom.  Patient instructed about the schedule of the day including: vital sign frequency, lab draws, possible tests, frequency of MD and staff rounds, including RN/MD rounding together at bedside, daily weights, and I &O's.  Patient instructed about prescribed diet, how to use call light, and television.  Bed alarm in place, patient aware of placement and reason. Telemetry box in place, patient aware of placement and reason.  Bed locked, in lowest position, side rails up 2/4, call light within reach.  Will continue to monitor.     
Patient received 150ml of contrast during her CTPA Abdomen/pelvis CT due to concern for infiltration during the first smart prep run. No infiltration occurred. Provider notified.  
Perfect Serve Message to Dr. Herrera   here. states that patient takes morphine 15mg BID not Morphine 30mg BID. Currently patient is awake but \"out of it\". Also he tells me she is allergic to Amiodarone and refused to take it.      Notified Cardiology about allergy to Amiodarone via perfect serve.  
Perfect serve message to Dr. Herrera and Cardiologist  Patient BP still low and she has had no urinary output today.    Awaiting response. MD read. NNO's.    1800 Assisted to bathroom using stedy. Patient voided. Most of output missed the hat. Per , patient is \"loopy\". MS Contin delayed per his request until 9pm.   
Physical Therapy  Facility/Department: [facility] A2 CARD TELEMETRY  Physical Therapy Initial Assessment/Treatment     Name: Lyndsay Roger  : 1955  MRN: [id number]  Date of Service: 2024    Discharge Recommendations:  Subacute/Skilled Nursing Facility   PT Equipment Recommendations  Equipment Needed: No  Other: Defer      Patient Diagnosis(es): The primary encounter diagnosis was Atrial fibrillation with RVR (HCC). Diagnoses of Poor fluid intake, Hx of heart failure, Pleural effusion, Acute on chronic congestive heart failure, unspecified heart failure type (HCC), and Permanent atrial fibrillation (HCC) were also pertinent to this visit.  Past Medical History:  has a past medical history of Acute DVT (deep venous thrombosis) (HCC), Allergic rhinitis, Anesthesia complication, Anesthesia complication, Anxiety, Atrial fibrillation (HCC), B12 deficiency, Cancer (HCC), CARDIAC TAMPONADE, Cardiomyopathy, Cataracts, CHF (congestive heart failure) (HCC), Chronic back pain, DDD (degenerative disc disease), lumbosacral, Depression, DJD (degenerative joint disease), cervical, GERD (gastroesophageal reflux disease), H/O cold sores, HX: breast cancer, Hypothyroid, ICD (implantable cardiac defibrillator) in place, Lung cancer (HCC), Neuropathy, Osteopenia, Pneumonia, Prolonged emergence from general anesthesia, and S/P cardiac cath.  Past Surgical History:  has a past surgical history that includes Cataract removal (1996); Cholecystectomy (2008); Mastectomy, modified radical (1993); back surgery (2007); Carpal tunnel release (2000); Cervical spine surgery (2009); Colonoscopy (2010); Upper gastrointestinal endoscopy (2010); Breast reconstruction (1998); Hysterectomy (1984); Hand surgery (10/01/2011); Cardiac surgery (2014); bone marrow biopsy (N/A); bone marrow transplant (N/A, 1993); shoulder surgery (Right, 2023); IR MIDLINE CATH 
Pt HR sustaining in the 130-140s. EKG confirms AF RVR. Cardiology paged & 25 mg PO metoprolol ordered at this time. Will continue to monitor.  
Pt HR sustaining in the 160's when up to commode.  
Pt back in afib RVR. Cardiology paged and 250 mcg IV digoxin ordered at this time d/t pts soft pressures.  
Pt converted back into SR at this time.  
Pt with no urinary output overnight. Pt assisted to commode by 2 RNs, but pt unable to void at this time. Pt bladder scanned and holding 199 mL of urine.  
Pt with several episodes of HR dipping into the 30's. Pt asymptomatic when rounding & VSS. Will continue to monitor.  
Pt's BP soft since beginning of shift. BP dropped as low as 74/39 with a HR in the 70's. Pt presenting more lethargic and weak. BREE Fitzpatrick asked to come to bedside. 5 mg midodrine & 12.5 g of albumin ordered at this time.  
Report called to [facility].  Pt d/c'd to [facility].  Removed peripheral IV and stopped bleeding.  Catheter intact. Pt tolerated well. No redness noted at site.  Notified [facility] and removed tele box. Reviewed d/c instructions, home meds, and  f/u information utilizing teach-back method. Physical scripts sent with patient. Patient verbalized understanding.      
not displaced  Heart tones are crisp and normal. regular S1 and S2.  Jugular venous pulsation Normal  The carotid upstroke is normal in amplitude and contour without delay or bruit  Peripheral pulses are symmetrical and full   Abdomen:  No masses or tenderness  Bowel sounds present  Extremities:   No cyanosis or clubbing   No lower extremity edema   Skin: warm and dry  Neurological:  Alert and oriented  Moves all extremities well  No abnormalities of mood, affect, memory, mentation, or behavior are noted    Data  EKG 6/16/2024:    bpm     Echo 5/2023:  Study Conclusions     - Left ventricle: The cavity size is normal. Systolic function was     moderately reduced. The calculated ejection fraction was in the     range of 30% to 35%. Features are consistent with a pseudonormal     left ventricular filling pattern, with concomitant abnormal     relaxation and increased filling pressure (grade 2 diastolic     dysfunction).   - Aortic valve: The peak systolic gradient is 6mm Hg.   - Left atrium: The atrium is dilated.   - Inferior vena cava: The vessel was normal in size.   - Technically difficult study.        All labs and testing reviewed.  Lab Review     Renal Profile:   Lab Results   Component Value Date/Time    CREATININE 0.8 06/18/2024 05:21 AM    BUN 35 06/18/2024 05:21 AM     06/18/2024 05:21 AM    K 4.1 06/18/2024 05:21 AM    CL 97 06/18/2024 05:21 AM    CO2 25 06/18/2024 05:21 AM     CBC:    Lab Results   Component Value Date/Time    WBC 11.6 06/18/2024 05:21 AM    RBC 2.60 06/18/2024 05:21 AM    HGB 9.0 06/18/2024 05:21 AM    HCT 26.2 06/18/2024 05:21 AM    .8 06/18/2024 05:21 AM    RDW 17.6 06/18/2024 05:21 AM    PLT 67 06/18/2024 05:21 AM     BNP:  No results found for: \"BNP\"  Fasting Lipid Panel:    Lab Results   Component Value Date/Time    CHOL 212 05/09/2014 10:36 AM    HDL 63 05/09/2014 10:36 AM    HDL 72 12/17/2010 09:03 AM    TRIG 191 05/09/2014 10:36 AM     Cardiac 
05/09/2014 10:36 AM     Cardiac Enzymes:  CK/MbTroponin  Lab Results   Component Value Date/Time    CKTOTAL 83 04/16/2012 11:33 AM    CKMB 1.03 04/16/2012 11:33 AM    CKMBINDEX 1.2 04/16/2012 11:33 AM    TROPONINI <0.01 05/21/2016 02:00 PM     PT/ INR   Lab Results   Component Value Date/Time    INR 2.17 06/19/2024 05:07 AM    INR 2.80 06/18/2024 05:21 AM    INR 3.82 06/17/2024 05:19 AM    PROTIME 24.2 06/19/2024 05:07 AM    PROTIME 29.4 06/18/2024 05:21 AM    PROTIME 37.3 06/17/2024 05:19 AM     PTT No components found for: \"PTT\"   Lab Results   Component Value Date/Time    MG 1.70 09/20/2015 05:00 AM      Lab Results   Component Value Date/Time    TSH 4.82 06/16/2024 03:10 AM       Assessment:  Paroxysmal atrial fibrillation: ongoing    -[id number] score 4 (age, gender, HTN, CHF)    -s/p cryoablation 2020   -reported ataxia with amiodarone use 2013  Nonischemic cardiomyopathy: ongoing   -EF 30-35% on echo 5/2023   -s/p BiV ICD implant 2014 with explant 2019 due to infection (device not replaced due to occluded SVC and EF > 35%)    -possibly secondary to Adriamycin exposure for breast cancer treatment   HFrEF   HTN: hypotensive this admission   Moderate to large right pleural effusion   Lung cancer   Breast cancer   Hypothyroidism   Anemia   Poor dietary intake      Plan:   1. Continue Coumadin and Toprol   2. Continue amiodarone. Per chart review, patient had possible ataxia in 2013 associated with amiodarone. Unfortunately, treatment options are limited given renal function and hypotension. Conversation occurred between Dr. Stallworth and spouse. May continue to have some breakthrough AF until fully loaded with amiodarone.   3. Consider holding Entresto and Lasix given ongoing hypotension  4. Okay for discharge from an EP standpoint Patient to follow with her primary cardiology team at [facility].         Janet Hodges, APRN-CNP  [facility]  [phone]

## 2024-06-19 NOTE — DISCHARGE INSTR - COC
MANAGEMENT/SOCIAL WORK SECTION    Inpatient Status Date: ***    Readmission Risk Assessment Score:  Readmission Risk              Risk of Unplanned Readmission:  18           Discharging to Facility/ Agency   Name: [facility]   Address:  Phone:[phone]  Fax:    Dialysis Facility (if applicable)   Name:  Address:  Dialysis Schedule:  Phone:  Fax:    / signature: Electronically signed by Brittny Hill RN on 6/19/24 at 10:50 AM EDT    PHYSICIAN SECTION    Prognosis: Good    Condition at Discharge: Stable    Rehab Potential (if transferring to Rehab): Good    Recommended Labs or Other Treatments After Discharge: PT/OT, skilled nursing    Physician Certification: I certify the above information and transfer of Lyndsay Roger  is necessary for the continuing treatment of the diagnosis listed and that she requires Skilled Nursing Facility for less 30 days.     Update Admission H&P: No change in H&P    PHYSICIAN SIGNATURE:  Electronically signed by Fernando Herrera MD on 6/19/24 at 2:03 PM EDT

## 2024-06-19 NOTE — CARE COORDINATION
Bed at [facility] became available today.         CASE MANAGEMENT DISCHARGE SUMMARY      Discharge to: [facility]    Precertification completed: n/a  Hospital Exemption Notification (HENS) completed: yes     IMM given: (date) 6/19/24   Follow-Up copy of Important Message from Medicare (IMM2) has been explained to patient and/or designated healthcare decision maker (HCDM). Pt and/or HCDM aware that patient is permitted to stay an additional 4 hours prior to discharge to consider an appeal if they feel as though they are being discharged too soon. Patient may discharge as planned if chooses to do so.  Patient/HCDM voice no other concerns or questions regarding this process.      Transportation: ambulance    Medical Transport explained to pt/family. Pt/family voice no agency preference.    Agency used:[facility]   time:5pm   Ambulance form completed: Yes    Confirmed discharge plan with:patient//RN/Brianda with [facility]     Patient: yes     Family:  yes      Facility/Agency, name:  [facility] faxed   Phone number for report to facility: [phone]     RN, name: Samantha     Note: Discharging nurse to complete [facility], reconcile [facility], and place final copy with patient's discharge packet. RN to ensure that written prescriptions for  Level II medications are sent with patient to the facility as per protocol.

## 2024-06-20 NOTE — DISCHARGE SUMMARY
Hospital Medicine Discharge Summary    Patient: Lyndsay Roger   : 1955     Admit Date: 6/15/2024   Discharge Date: 2024    Disposition:  []Home   []HHC  [x]SNF  []ECF  []Acute Rehab  []LTAC  []Hospice  Code status:  [x]Full  []DNR/CCA  []Limited (DNR/CCA with Do Not Intubate)  []DNRCC  Condition at Discharge: Stable  Primary Care Provider: Ashley Colin MD    Admitting Provider: Shiloh Beard DO  Discharge Provider: Fernando Herrera MD     Discharge Diagnoses:      Active Hospital Problems    Diagnosis     HX: breast cancer [Z85.3]      Priority: Medium    Atrial fibrillation with RVR (HCC) [I48.91]     PAF (paroxysmal atrial fibrillation) (HCC) [I48.0]     Atrial fibrillation (HCC) [I48.91]     NICM (nonischemic cardiomyopathy) (HCC) [I42.8]        Presenting Admission History:      69 y.o. female, with PMH of atrial fibrillation, breast cancer, hypothyroidism, who presented to [facility] with shortness of breath. History obtained from the patient and review of EMR. The patient stated this afternoon she had an onset of shortness of breath. She stated that her shortness of breath is worse on exertion. The patient stated she actually \"felt like she was in atrial fibrillation\". However, the patient currently stated that it was this afternoon, but not given time. Patient stated she has also been experiencing a poor appetite with decreased fluids and food. She does state that she is compliant with taking all of her medications. In the emergency department the patient was found to have an elevated D-dimer, therefore, a CT chest pulmonary embolism was obtained that revealed no evidence of pulmonary arterial embolism.Moderate to large right pleural effusion has increased in size, with small left pleural effusion. Opacified portion of the right lower lobe and along the left base may represent atelectatic changes but pneumonia is not excluded. Nodular opacity along the medial right upper lobe and

## 2024-06-25 NOTE — ED NOTES
Pt appears to possibly be having seizure like activity, MD notified at bedside, orders verbal to give 4mg IV Push of Versed from bag, given, drip returned to 2mg/hr

## 2024-06-25 NOTE — ED NOTES
MD at bedside per family request, requesting we cease efforts and withdraw care. Verbal from MD to stop all drips/fluids other than Fentanyl and Versed.

## 2024-06-25 NOTE — ED NOTES
Family no longer at bedside, requesting to contact  home at [facility] [place] in [facility] for arrangements

## 2024-06-25 NOTE — ED NOTES
Pt to ED by EMS s/p ROSC. EMS was activated at 2105 for unresponsive and arrived at 2111 made contact with pt, pt was in Asystole, CPR for 28 minutes performed with 1mg epi given to I/O at r humeral head, got ROSC, placed I-GEL and BVM en route to ED. EKG showed Sinus rhythm with RBBB. Pt reportedly is set up to be transitioned to hospice tomorrow but is not presently a DNR.

## 2024-06-25 NOTE — ED NOTES
Post-Mortem care to pt with assistance from RAFAEL Morocho in cleaning pt and placing in fresh gown and into body bag with pt only belonging of her tshirt that was left behind by family which is labeled and with pt, will call security to notify of pt being ready for transport to [facility].

## 2024-06-25 NOTE — PROGRESS NOTES
06/24/24 2208   Patient Observation   Pulse 88   Respirations 16   SpO2 100 %   Vent Information   Ventilator Safety Check Performed Pre-Use Yes   Vent Mode AC/VC   Ventilator Settings   FiO2  80 %  (down from 100%)   Vt (Set, mL) 400 mL   Resp Rate (Set) 16 bpm   PEEP/CPAP (cmH2O) 5   Vent Patient Data (Readings)   Vt (Measured) 401 mL   Rate Measured 16 br/min   Minute Volume (L/min) 6.08 Liters   Peak Inspiratory Flow (lpm) 24 L/sec   Mean Airway Pressure (cmH2O) 9.5 cmH20   I:E Ratio 1:3.3   Backup Apnea On   Backup Rate 16 Breaths Per Minute   Backup Vt 400   Vent Alarm Settings   High Pressure (cmH2O) 40 cmH2O   Low Minute Volume (lpm) 2 L/min   High Minute Volume (lpm) 20 L/min   Low Exhaled Vt (ml) 200 mL   High Exhaled Vt (ml) 1000 mL   RR High (bpm) 40 br/min   Apnea (secs) 20 secs   Additional Respiratoray Assessments   Humidification Source HME   Ambu Bag With Mask At Bedside Yes   Airway Clearance   Suction ET Tube   Subglottic Suction Done No   Suction Device Inline suction catheter   Sputum Method Obtained Endotracheal   Sputum Amount Scant   Sputum Color/Odor White   Sputum Consistency Thick   ETT    Placement Date: 06/24/24   Placed By: In ED  Airway Tube Size: 8 mm  Location: Oral  Secured At: 24 cm  Measured From: Teeth   $ Intubation Emergent  $ Yes   Secured At 24 cm   Measured From Teeth   ETT Placement Center   Secured By Commercial tube mayes   Site Assessment Cool;Dry   Tie/Mayes Changed Yes

## 2024-06-25 NOTE — ED NOTES
Spoke with Waqas at [facility] 's office regarding pt death, states has no forensic concerns and okay to release

## 2024-06-25 NOTE — ED NOTES
Wasted 760mcg Fentanyl from drip as well as 82mg Versed from drip s/p withdrawal of care / expiration per MD order to stop all meds. Waste with RAFAEL Morocho as witness.

## 2024-06-25 NOTE — ED NOTES
XR at bedside, recommendation to advance OG tube, advanced with increase in output to suction canister.

## 2024-06-26 LAB
BACTERIA UR CULT: ABNORMAL
ORGANISM: ABNORMAL
REPORT: NORMAL

## 2024-06-28 LAB
BACTERIA BLD CULT ORG #2: ABNORMAL
BACTERIA BLD CULT ORG #2: ABNORMAL
ORGANISM: ABNORMAL
ORGANISM: ABNORMAL

## 2024-06-29 LAB — BACTERIA BLD CULT: NORMAL

## 2024-09-06 NOTE — PROGRESS NOTES
Ms. Jerzy Randall is here for management of anticoagulation for Afib. PMH also significant for GERD, anxiety, depression, cardiomyopathy, hypothyroid, chronic back pain, hx of breast cancer, pacemaker placement, back surgery, and osteopenia. Pt verifies dosing regimen. Pt denies s/s bleeding/bruising/swelling/SOB. Pt reports nose bleeds on and off for 3 days. No changes in Rx/OTC/Herbal medications. Using Veg[employer] hair serum for [place] loss. She is on oxycodone for back pain. Pt is still having a couple servings of greens per week. Pt does not smoke and rarely drinks EtOH. Pt is still under a lot of stress, plus she broke her arm. Getting shoulder surgery on Feb 21st    INR 3.4 is above the therapeutic range of 2-3. Recommend to hold today, then reduce dose to 3mg daily, except 4.5mg Mon. Patient has warfarin 3 mg tablets. Will continue to monitor and check INR in 4 weeks, pt cannot come back sooner. Dosing reminder card given with phone number, appointment date and time.   Return to clinic: 1/16 at 100  Referring physician: Dr. Lori Tolliver [Normal] : normal rate, regular rhythm, normal S1 and S2 and no murmur heard [de-identified] : nasal passages inflammed, tms- fluid